# Patient Record
Sex: FEMALE | Race: WHITE | NOT HISPANIC OR LATINO | Employment: FULL TIME | URBAN - METROPOLITAN AREA
[De-identification: names, ages, dates, MRNs, and addresses within clinical notes are randomized per-mention and may not be internally consistent; named-entity substitution may affect disease eponyms.]

---

## 2017-08-01 NOTE — PROGRESS NOTES
SUBJECTIVE:                                                    Lillie Sánchez is a 32 year old female who presents to clinic today for the following health issues:      Tick Bite, Red ring. Right side rib area. X 3 weeks. Redness is going away, but still itches.   No signs of infection. Denies fatigue, fevers, chills, arthralgias, and myalgias.     Hemorrhoids - problems with hemorrhoids for 10 years. States she is not currently in a flare of her symptoms. She would like a referral to colorectal surgery to discuss treatment options. Will try topical steroid treatment in the mean time. She denies any other concerns today.          Problem list and histories reviewed & adjusted, as indicated.  Additional history: as documented    Patient Active Problem List   Diagnosis     Hypothyroidism     CARDIOVASCULAR SCREENING; LDL GOAL LESS THAN 160     Migraine     Right shoulder pain     Tobacco abuse     Menorrhagia     Other specified hypothyroidism     Past Surgical History:   Procedure Laterality Date     NO HISTORY OF SURGERY         Social History   Substance Use Topics     Smoking status: Light Tobacco Smoker     Packs/day: 0.50     Years: 10.00     Types: Cigarettes     Start date: 1/1/2005     Last attempt to quit: 9/4/2015     Smokeless tobacco: Never Used     Alcohol use 0.0 oz/week     Family History   Problem Relation Age of Onset     Breast Cancer Other 50     2 aunts     Depression Sister      Depression Mother      Thyroid Disease Mother      multiple maternal relatives with hypothyroidism     Depression Maternal Grandmother      Thyroid Disease Maternal Grandmother          Current Outpatient Prescriptions   Medication Sig Dispense Refill     Etonogestrel (NEXPLANON SC)        hydrocortisone (ANUSOL-HC) 25 MG Suppository Place 1 suppository (25 mg) rectally 2 times daily (Patient not taking: Reported on 8/10/2017) 28 suppository 1     levothyroxine (SYNTHROID/LEVOTHROID) 100 MCG tablet Take 1 tablet  (100 mcg) by mouth daily 90 tablet 3     clindamycin (CLEOCIN) 300 MG capsule TK ONE C PO QID  0     acetaminophen-codeine (TYLENOL #3) 300-30 MG per tablet TK ONE TO TWO TS PO Q 4 H PRN  0     Allergies   Allergen Reactions     Vicodin [Hydrocodone-Acetaminophen]      nausea     BP Readings from Last 3 Encounters:   08/10/17 119/80   08/04/17 117/72   09/12/16 121/78    Wt Readings from Last 3 Encounters:   08/10/17 147 lb (66.7 kg)   08/04/17 150 lb (68 kg)   09/15/16 146 lb (66.2 kg)                        Reviewed and updated as needed this visit by clinical staff  Tobacco  Allergies  Meds  Med Hx  Surg Hx  Fam Hx  Soc Hx      Reviewed and updated as needed this visit by Provider         ROS:  Constitutional, cardiovascular, pulmonary, gi, integumentary systems are negative, except as otherwise noted.      OBJECTIVE:   /72  Pulse 86  Temp 100.3  F (37.9  C) (Oral)  Wt 150 lb (68 kg)  SpO2 99%  BMI 23.49 kg/m2  Body mass index is 23.49 kg/(m^2).  GENERAL: healthy, alert and no distress  NECK: no adenopathy, no asymmetry, masses, or scars and thyroid normal to palpation  RESP: lungs clear to auscultation - no rales, rhonchi or wheezes  CV: regular rate and rhythm, normal S1 S2, no S3 or S4, no murmur, click or rub, no peripheral edema and peripheral pulses strong  RECTAL (female): normal sphincter tone, no rectal masses and 1 external hemorrhoid - non-thrombosed, no bleeding  MS: no gross musculoskeletal defects noted, no edema  SKIN: no suspicious lesions or rashes and right lateral rib cage - small area of erythema, no swelling, no drainage, no tenderness to palpation, no signs of infection, no bulls-eye rash present    Diagnostic Test Results:  none    ASSESSMENT/PLAN:       ICD-10-CM    1. External hemorrhoids K64.4 COLORECTAL SURGERY REFERRAL     hydrocortisone (ANUSOL-HC) 25 MG Suppository   2. Tick bite, initial encounter W57.XXXA        Patient Instructions   Tick Bite:  Apply over the  counter hydrocortisone cream twice daily. This will help bring down inflammation and itching. Use cool compresses to decrease itching. Return to the clinic if spreading redness, pain, foul drainage, fevers, or any other concerns.   Hemorrhoids    Hemorrhoids are swollen and inflamed veins inside the rectum and near the anus. The rectum is the last several inches of the colon. The anus is the passage between the rectum and the outside of the body.  Causes  The veins can become swollen due to increased pressure in them. This is most often caused by:    Chronic constipation or diarrhea    Straining when having a bowel movement    Sitting too long on the toilet    A low-fiber diet    Pregnancy  Symptoms    Bleeding from the rectum (this may be noticeable after bowel movements)    Lump near the anus    Itching around the anus    Pain around the anus  There are different types of hemorrhoids. Depending on the type you have and the severity, you may be able to treat yourself at home. In some cases, a procedure may be the best treatment option. Your healthcare provider can tell you more about this, if needed.  Home care  General care    To get relief from pain or itching, try:    Topical products. Your healthcare provider may prescribe or recommend creams, ointments, or pads that can be applied to the hemorrhoid. Use these exactly as directed.    Medicines. Your healthcare provider may recommend stool softeners, suppositories, or laxatives to help manage constipation. Use these exactly as directed.    Sitz baths. A sitz bath involves sitting in a few inches of warm bath water. Be careful not to make the water so hot that you burn yourself--test it before sitting in it. Soak for about 10 to 15 minutes a few times a day. This may help relieve pain.  Tips to help prevent hemorrhoids    Eat more fiber. Fiber adds bulk to stool and absorbs water as it moves through your colon. This makes stool softer and easier to  pass.    Increase the fiber in your diet with more fiber-rich foods. These include fresh fruit, vegetables, and whole grains.    Take a fiber supplement or bulking agent, if advised to by your provider. These include products such as psyllium or methylcellulose.    Drink plenty of water, if directed to by your provider. This can help keep stool soft.    Be more active. Frequent exercise aids digestion and helps prevent constipation. It may also help make bowel movements more regular.    Don t strain during bowel movements. This can make hemorrhoids more likely. Also, don t sit on the toilet for long periods of time.  Follow-up care  Follow up with your healthcare provider, or as advised. If a culture or imaging tests were done, you will be notified of the results when they are ready. This may take a few days or longer.  When to seek medical advice  Call your healthcare provider right away if any of these occur:    Increased bleeding from the rectum    Increased pain around the rectum or anus    Weakness or dizziness  Call 911  Call 911 or return to the emergency department right away if any of these occur:    Trouble breathing or swallowing    Fainting or loss of consciousness    Unusually fast heart rate    Vomiting blood    Large amounts of blood in stool  Date Last Reviewed: 6/22/2015 2000-2017 [employer]. [address] [place] [address], [place] [address]. All rights reserved. This information is not intended as a substitute for professional medical care. Always follow your healthcare professional's instructions.            Mehnaz Villarreal PA-C  [facility]

## 2017-08-04 ENCOUNTER — OFFICE VISIT (OUTPATIENT)
Dept: FAMILY MEDICINE | Facility: CLINIC | Age: 32
End: 2017-08-04
Payer: COMMERCIAL

## 2017-08-04 VITALS
HEART RATE: 86 BPM | OXYGEN SATURATION: 99 % | TEMPERATURE: 100.3 F | DIASTOLIC BLOOD PRESSURE: 72 MMHG | SYSTOLIC BLOOD PRESSURE: 117 MMHG | WEIGHT: 150 LBS | BODY MASS INDEX: 23.49 KG/M2
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
## 2017-08-04 DIAGNOSIS — K64.4 EXTERNAL HEMORRHOIDS: Primary | ICD-10-CM

## 2017-08-04 PROCEDURE — 99213 OFFICE O/P EST LOW 20 MIN: CPT | Performed by: PHYSICIAN ASSISTANT

## 2017-08-04 RX ORDER — HYDROCORTISONE ACETATE 25 MG/1
25 SUPPOSITORY RECTAL 2 TIMES DAILY
Qty: 28 SUPPOSITORY | Refills: 1 | Status: SHIPPED | OUTPATIENT
Start: 2017-08-04 | End: 2018-10-18

## 2017-08-04 RX ORDER — CLINDAMYCIN HCL 300 MG
CAPSULE ORAL
Refills: 0 | COMMUNITY
Start: 2017-08-01 | End: 2018-10-18

## 2017-08-04 NOTE — PATIENT INSTRUCTIONS
Tick Bite:  Apply over the counter hydrocortisone cream twice daily. This will help bring down inflammation and itching. Use cool compresses to decrease itching. Return to the clinic if spreading redness, pain, foul drainage, fevers, or any other concerns.   Hemorrhoids    Hemorrhoids are swollen and inflamed veins inside the rectum and near the anus. The rectum is the last several inches of the colon. The anus is the passage between the rectum and the outside of the body.  Causes  The veins can become swollen due to increased pressure in them. This is most often caused by:    Chronic constipation or diarrhea    Straining when having a bowel movement    Sitting too long on the toilet    A low-fiber diet    Pregnancy  Symptoms    Bleeding from the rectum (this may be noticeable after bowel movements)    Lump near the anus    Itching around the anus    Pain around the anus  There are different types of hemorrhoids. Depending on the type you have and the severity, you may be able to treat yourself at home. In some cases, a procedure may be the best treatment option. Your healthcare provider can tell you more about this, if needed.  Home care  General care    To get relief from pain or itching, try:    Topical products. Your healthcare provider may prescribe or recommend creams, ointments, or pads that can be applied to the hemorrhoid. Use these exactly as directed.    Medicines. Your healthcare provider may recommend stool softeners, suppositories, or laxatives to help manage constipation. Use these exactly as directed.    Sitz baths. A sitz bath involves sitting in a few inches of warm bath water. Be careful not to make the water so hot that you burn yourself--test it before sitting in it. Soak for about 10 to 15 minutes a few times a day. This may help relieve pain.  Tips to help prevent hemorrhoids    Eat more fiber. Fiber adds bulk to stool and absorbs water as it moves through your colon. This makes stool softer  and easier to pass.    Increase the fiber in your diet with more fiber-rich foods. These include fresh fruit, vegetables, and whole grains.    Take a fiber supplement or bulking agent, if advised to by your provider. These include products such as psyllium or methylcellulose.    Drink plenty of water, if directed to by your provider. This can help keep stool soft.    Be more active. Frequent exercise aids digestion and helps prevent constipation. It may also help make bowel movements more regular.    Don t strain during bowel movements. This can make hemorrhoids more likely. Also, don t sit on the toilet for long periods of time.  Follow-up care  Follow up with your healthcare provider, or as advised. If a culture or imaging tests were done, you will be notified of the results when they are ready. This may take a few days or longer.  When to seek medical advice  Call your healthcare provider right away if any of these occur:    Increased bleeding from the rectum    Increased pain around the rectum or anus    Weakness or dizziness  Call 911  Call 911 or return to the emergency department right away if any of these occur:    Trouble breathing or swallowing    Fainting or loss of consciousness    Unusually fast heart rate    Vomiting blood    Large amounts of blood in stool  Date Last Reviewed: 6/22/2015 2000-2017 [employer]. [address] [place] [address], [place] [address]. All rights reserved. This information is not intended as a substitute for professional medical care. Always follow your healthcare professional's instructions.

## 2017-08-04 NOTE — MR AVS SNAPSHOT
After Visit Summary   8/4/2017    Lillie Sánchez    MRN: [id number]           Patient Information     Date Of Birth          1985        Visit Information        Provider Department      8/4/2017 4:00 PM Mehnaz Villarreal PA-C [facility]        Today's Diagnoses     External hemorrhoids    -  1      Care Instructions    Tick Bite:  Apply over the counter hydrocortisone cream twice daily. This will help bring down inflammation and itching. Use cool compresses to decrease itching. Return to the clinic if spreading redness, pain, foul drainage, fevers, or any other concerns.   Hemorrhoids    Hemorrhoids are swollen and inflamed veins inside the rectum and near the anus. The rectum is the last several inches of the colon. The anus is the passage between the rectum and the outside of the body.  Causes  The veins can become swollen due to increased pressure in them. This is most often caused by:    Chronic constipation or diarrhea    Straining when having a bowel movement    Sitting too long on the toilet    A low-fiber diet    Pregnancy  Symptoms    Bleeding from the rectum (this may be noticeable after bowel movements)    Lump near the anus    Itching around the anus    Pain around the anus  There are different types of hemorrhoids. Depending on the type you have and the severity, you may be able to treat yourself at home. In some cases, a procedure may be the best treatment option. Your healthcare provider can tell you more about this, if needed.  Home care  General care    To get relief from pain or itching, try:    Topical products. Your healthcare provider may prescribe or recommend creams, ointments, or pads that can be applied to the hemorrhoid. Use these exactly as directed.    Medicines. Your healthcare provider may recommend stool softeners, suppositories, or laxatives to help manage constipation. Use these exactly as directed.    Sitz baths. A sitz bath involves  sitting in a few inches of warm bath water. Be careful not to make the water so hot that you burn yourself--test it before sitting in it. Soak for about 10 to 15 minutes a few times a day. This may help relieve pain.  Tips to help prevent hemorrhoids    Eat more fiber. Fiber adds bulk to stool and absorbs water as it moves through your colon. This makes stool softer and easier to pass.    Increase the fiber in your diet with more fiber-rich foods. These include fresh fruit, vegetables, and whole grains.    Take a fiber supplement or bulking agent, if advised to by your provider. These include products such as psyllium or methylcellulose.    Drink plenty of water, if directed to by your provider. This can help keep stool soft.    Be more active. Frequent exercise aids digestion and helps prevent constipation. It may also help make bowel movements more regular.    Don t strain during bowel movements. This can make hemorrhoids more likely. Also, don t sit on the toilet for long periods of time.  Follow-up care  Follow up with your healthcare provider, or as advised. If a culture or imaging tests were done, you will be notified of the results when they are ready. This may take a few days or longer.  When to seek medical advice  Call your healthcare provider right away if any of these occur:    Increased bleeding from the rectum    Increased pain around the rectum or anus    Weakness or dizziness  Call 911  Call 911 or return to the emergency department right away if any of these occur:    Trouble breathing or swallowing    Fainting or loss of consciousness    Unusually fast heart rate    Vomiting blood    Large amounts of blood in stool  Date Last Reviewed: 6/22/2015 2000-2017 [employer] [address] [place] [address], [place] [address]. All rights reserved. This information is not intended as a substitute for professional medical care. Always follow your healthcare professional's instructions.                 Follow-ups after your visit        Additional Services     COLORECTAL SURGERY REFERRAL       Your provider has referred you to: [facility]: Colon and Rectal Surgery [facility] [phone]   [email or web address]  N: Colon and Rectal Surgery [facility] [phone]   [email or web address]  [place] [phone]   [email or web address]  [facility]: [facility] [place] [phone]   [email or web address]  [place] [phone]   [email or web address]    Referral Reason(s): Hemorrhoids  Special Concerns: None  This referral is: urgent - earliest available appointment  It is OK to leave a message on patient's voicemail.    Please be aware that coverage of these services is subject to the terms and limitations of your health insurance plan.  Call member services at your health plan with any benefit or coverage questions.      Please bring the following with you to your appointment:    (1) Any X-Rays, CTs or MRIs which have been performed.  Contact the facility where they were done to arrange for  prior to your scheduled appointment.    (2) List of current medications  (3) This referral request   (4) Any documents/labs given to you for this referral                  Who to contact     If you have questions or need follow up information about today's clinic visit or your schedule please contact [facility] directly at [phone].  Normal or non-critical lab and imaging results will be communicated to you by MyChart, letter or phone within 4 business days after the clinic has received the results. If you do not hear from us within 7 days, please contact the clinic through MyChart or phone. If you have a critical or abnormal lab result, we will notify you by phone as soon as possible.  Submit refill requests through [employer] or call your pharmacy and they will forward the refill request to  us. Please allow 3 business days for your refill to be completed.          Additional Information About Your Visit        [facility] Information     [email or web address] gives you secure access to your electronic health record. If you see a primary care provider, you can also send messages to your care team and make appointments. If you have questions, please call your primary care clinic.  If you do not have a primary care provider, please call [phone] and they will assist you.        Care EveryWhere ID     This is your Care EveryWhere ID. This could be used by other organizations to access your [facility] medical records  [id number]        Your Vitals Were     Pulse Temperature Pulse Oximetry BMI (Body Mass Index)          86 100.3  F (37.9  C) (Oral) 99% 23.49 kg/m2         Blood Pressure from Last 3 Encounters:   08/04/17 117/72   09/12/16 121/78   06/28/16 108/71    Weight from Last 3 Encounters:   08/04/17 150 lb (68 kg)   09/15/16 146 lb (66.2 kg)   09/12/16 141 lb (64 kg)              We Performed the Following     COLORECTAL SURGERY REFERRAL          Today's Medication Changes          These changes are accurate as of: 8/4/17  4:27 PM.  If you have any questions, ask your nurse or doctor.               Start taking these medicines.        Dose/Directions    hydrocortisone 25 MG Suppository   Commonly known as:  ANUSOL-HC   Used for:  External hemorrhoids   Started by:  Mehnaz Villarreal PA-C        Dose:  25 mg   Place 1 suppository (25 mg) rectally 2 times daily   Quantity:  28 suppository   Refills:  1         Stop taking these medicines if you haven't already. Please contact your care team if you have questions.     CHELLE-BE 0.35 MG per tablet   Generic drug:  norethindrone   Stopped by:  Mehnaz Villarreal PA-C           order for DME   Stopped by:  Mehnaz Villarreal PA-C                Where to get your medicines      [facility] [address] [place] [address]  [facility] [address] AT [facility] [address]  [address] [facility] [address]     Phone:  [phone]     hydrocortisone 25 MG Suppository                Primary Care Provider Office Phone # Fax #    [facility] [phone] [address] [facility] [address]        Equal Access to Services     JOSÉ MIGUEL CHONG : Hadii aad ku hadasho Soomaali, waaxda luqadaha, qaybta kaalmada adeegyada, waxay idiin hayaan adeeg kharash la'dougn ah. So [facility] [phone].    ATENCIÓN: Si habla español, tiene a larson disposición servicios gratuitos de asistencia lingüística. [facility] [phone].    We comply with applicable federal civil rights laws and [place] laws. We do not discriminate on the basis of race, color, national origin, age, disability sex, sexual orientation or gender identity.            Thank you!     Thank you for choosing [facility]  for your care. Our goal is always to provide you with excellent care. Hearing back from our patients is one way we can continue to improve our services. Please take a few minutes to complete the written survey that you may receive in the mail after your visit with us. Thank you!             Your Updated Medication List - Protect others around you: Learn how to safely use, store and throw away your medicines at www.disposemymeds.org.          This list is accurate as of: 8/4/17  4:27 PM.  Always use your most recent med list.                   Brand Name Dispense Instructions for use Diagnosis    acetaminophen-codeine 300-30 MG per tablet    TYLENOL #3     TK ONE TO TWO TS PO Q 4 H PRN        clindamycin 300 MG capsule    CLEOCIN     TK ONE C PO QID        hydrocortisone 25 MG Suppository    ANUSOL-HC    28 suppository    Place 1 suppository (25 mg) rectally 2 times daily    External hemorrhoids       levothyroxine 100 MCG tablet    SYNTHROID/LEVOTHROID    90 tablet    Take 1 tablet (100 mcg) by mouth daily    Other specified  hypothyroidism       NEXPLANON SC

## 2017-08-04 NOTE — NURSING NOTE
"Chief Complaint   Patient presents with     Insect Bites     tick bite       Initial /72  Pulse 86  Temp 100.3  F (37.9  C) (Oral)  Wt 150 lb (68 kg)  SpO2 99%  BMI 23.49 kg/m2 Estimated body mass index is 23.49 kg/(m^2) as calculated from the following:    Height as of 9/15/16: 5' 7\" (1.702 m).    Weight as of this encounter: 150 lb (68 kg).  Medication Reconciliation: complete     Rosalia Davis cma        "

## 2017-08-04 NOTE — Clinical Note
Please abstract the following data from this visit with this patient into the appropriate field in Epic:  Pap smear done on this date: 10/15 (approximately), by this group: [facility], results were Normal. Rosalia Davis, cma

## 2017-08-10 ENCOUNTER — OFFICE VISIT (OUTPATIENT)
Dept: FAMILY MEDICINE | Facility: CLINIC | Age: 32
End: 2017-08-10
Payer: COMMERCIAL

## 2017-08-10 VITALS
OXYGEN SATURATION: 100 % | BODY MASS INDEX: 23.07 KG/M2 | HEART RATE: 79 BPM | TEMPERATURE: 98.7 F | WEIGHT: 147 LBS | SYSTOLIC BLOOD PRESSURE: 119 MMHG | DIASTOLIC BLOOD PRESSURE: 80 MMHG | HEIGHT: 67 IN

## 2017-08-10 DIAGNOSIS — E03.8 OTHER SPECIFIED HYPOTHYROIDISM: Primary | ICD-10-CM

## 2017-08-10 LAB — TSH SERPL DL<=0.005 MIU/L-ACNC: 3.19 MU/L (ref 0.4–4)

## 2017-08-10 PROCEDURE — 36415 COLL VENOUS BLD VENIPUNCTURE: CPT | Performed by: PHYSICIAN ASSISTANT

## 2017-08-10 PROCEDURE — 84443 ASSAY THYROID STIM HORMONE: CPT | Performed by: PHYSICIAN ASSISTANT

## 2017-08-10 PROCEDURE — 99213 OFFICE O/P EST LOW 20 MIN: CPT | Performed by: PHYSICIAN ASSISTANT

## 2017-08-10 NOTE — LETTER
[facility]  [id number] [place] [address] [id number] [phone]        August 10, 2017        To Whom It May Concern:      Lillie Sánchez was seen in the clinic today. Please allow her to have a sit / stand desk at her work station.       If you have any questions or concerns, please contact the clinic at [phone].    Thank you,        Kristen Kehr PA-C

## 2017-08-10 NOTE — NURSING NOTE
"Chief Complaint   Patient presents with     Thyroid Problem     medication check per pt and refill       Initial /80  Pulse 79  Temp 98.7  F (37.1  C) (Oral)  Ht 5' 7\" (1.702 m)  Wt 147 lb (66.7 kg)  SpO2 100%  Breastfeeding? No  BMI 23.02 kg/m2 Estimated body mass index is 23.02 kg/(m^2) as calculated from the following:    Height as of this encounter: 5' 7\" (1.702 m).    Weight as of this encounter: 147 lb (66.7 kg).  Medication Reconciliation: complete      Viral Stratton MA    "

## 2017-08-10 NOTE — MR AVS SNAPSHOT
"              After Visit Summary   8/10/2017    Lillie Sánchez    MRN: [id number]           Patient Information     Date Of Birth          1985        Visit Information        Provider Department      8/10/2017 12:50 PM Kehr, Kristen M, PA-C [facility]        Today's Diagnoses     Other specified hypothyroidism    -  1       Follow-ups after your visit        Who to contact     If you have questions or need follow up information about today's clinic visit or your schedule please contact [facility] directly at [phone].  Normal or non-critical lab and imaging results will be communicated to you by SunnyBumphart, letter or phone within 4 business days after the clinic has received the results. If you do not hear from us within 7 days, please contact the clinic through Betaspringt or phone. If you have a critical or abnormal lab result, we will notify you by phone as soon as possible.  Submit refill requests through 4Blox or call your pharmacy and they will forward the refill request to us. Please allow 3 business days for your refill to be completed.          Additional Information About Your Visit        MyChart Information     4Blox gives you secure access to your electronic health record. If you see a primary care provider, you can also send messages to your care team and make appointments. If you have questions, please call your primary care clinic.  If you do not have a primary care provider, please call [phone] and they will assist you.        Care EveryWhere ID     This is your Care EveryWhere ID. This could be used by other organizations to access your [place] medical records  [id number]        Your Vitals Were     Pulse Temperature Height Pulse Oximetry Breastfeeding? BMI (Body Mass Index)    79 98.7  F (37.1  C) (Oral) 5' 7\" (1.702 m) 100% No 23.02 kg/m2       Blood Pressure from Last 3 Encounters:   08/10/17 119/80   08/04/17 117/72   09/12/16 121/78    Weight " from Last 3 Encounters:   08/10/17 147 lb (66.7 kg)   08/04/17 150 lb (68 kg)   09/15/16 146 lb (66.2 kg)              We Performed the Following     TSH with free T4 reflex        Primary Care Provider Office Phone # Fax #    [facility] [phone] [address] [place] [address]        Equal Access to Services     JOSÉ MIGUEL CHONG : Hadii aad ku hadasho Soomaali, waaxda luqadaha, qaybta kaalmada adeegyada, waxay idiin haydougn adetonya cabralesdaneglorobby ferro. So [facility] [phone].    ATENCIÓN: Si carrillo santos, tiene a larson disposición servicios gratuitos de asistencia lingüística. Llame al [phone].    We comply with applicable federal civil rights laws and [place] laws. We do not discriminate on the basis of race, color, national origin, age, disability sex, sexual orientation or gender identity.            Thank you!     Thank you for choosing [facility]  for your care. Our goal is always to provide you with excellent care. Hearing back from our patients is one way we can continue to improve our services. Please take a few minutes to complete the written survey that you may receive in the mail after your visit with us. Thank you!             Your Updated Medication List - Protect others around you: Learn how to safely use, store and throw away your medicines at www.disposemymeds.org.          This list is accurate as of: 8/10/17  1:07 PM.  Always use your most recent med list.                   Brand Name Dispense Instructions for use Diagnosis    acetaminophen-codeine 300-30 MG per tablet    TYLENOL #3     TK ONE TO TWO TS PO Q 4 H PRN        clindamycin 300 MG capsule    CLEOCIN     TK ONE C PO QID        hydrocortisone 25 MG Suppository    ANUSOL-HC    28 suppository    Place 1 suppository (25 mg) rectally 2 times daily    External hemorrhoids       levothyroxine 100 MCG tablet    SYNTHROID/LEVOTHROID    90 tablet    Take 1 tablet (100 mcg) by mouth daily    Other  specified hypothyroidism       NEXPLANON SC

## 2017-08-10 NOTE — PROGRESS NOTES
SUBJECTIVE:                                                    Lillie Sánchez is a 32 year old female who presents to clinic today for the following health issues:    Hypothyroidism Follow-up      Since last visit, patient describes the following symptoms: weight issue per pt and constipation      Amount of exercise or physical activity: 4-5 days/week for an average of 30-45 minutes    Problems taking medications regularly: No    Medication side effects: none    Diet: regular (no restrictions)          Problem list and histories reviewed & adjusted, as indicated.  Additional history: as documented    Patient Active Problem List   Diagnosis     Hypothyroidism     CARDIOVASCULAR SCREENING; LDL GOAL LESS THAN 160     Migraine     Right shoulder pain     Tobacco abuse     Menorrhagia     Other specified hypothyroidism     Past Surgical History:   Procedure Laterality Date     NO HISTORY OF SURGERY         Social History   Substance Use Topics     Smoking status: Light Tobacco Smoker     Packs/day: 0.50     Years: 10.00     Types: Cigarettes     Start date: 1/1/2005     Last attempt to quit: 9/4/2015     Smokeless tobacco: Never Used     Alcohol use 0.0 oz/week     Family History   Problem Relation Age of Onset     Breast Cancer Other 50     2 aunts     Depression Sister      Depression Mother      Thyroid Disease Mother      multiple maternal relatives with hypothyroidism     Depression Maternal Grandmother      Thyroid Disease Maternal Grandmother          Current Outpatient Prescriptions   Medication Sig Dispense Refill     Etonogestrel (NEXPLANON SC)        levothyroxine (SYNTHROID,LEVOTHROID) 100 MCG tablet Take 1 tablet (100 mcg) by mouth daily 90 tablet 3     clindamycin (CLEOCIN) 300 MG capsule TK ONE C PO QID  0     acetaminophen-codeine (TYLENOL #3) 300-30 MG per tablet TK ONE TO TWO TS PO Q 4 H PRN  0     hydrocortisone (ANUSOL-HC) 25 MG Suppository Place 1 suppository (25 mg) rectally 2 times daily  "(Patient not taking: Reported on 8/10/2017) 28 suppository 1     Allergies   Allergen Reactions     Vicodin [Hydrocodone-Acetaminophen]      nausea         ROS:  Constitutional, HEENT, cardiovascular, pulmonary, gi and gu systems are negative, except as otherwise noted.      OBJECTIVE:   /80  Pulse 79  Temp 98.7  F (37.1  C) (Oral)  Ht 5' 7\" (1.702 m)  Wt 147 lb (66.7 kg)  SpO2 100%  Breastfeeding? No  BMI 23.02 kg/m2  Body mass index is 23.02 kg/(m^2).  GENERAL: healthy, alert and no distress  NECK: no adenopathy, no asymmetry, masses, or scars and thyroid normal to palpation  PSYCH: mentation appears normal, affect normal/bright    Diagnostic Test Results:  pending    ASSESSMENT/PLAN:     1. Other specified hypothyroidism  Update TSH level and send refills after results are back.   - TSH with free T4 reflex      Kristen M. Kehr, PA-C  [facility]    "

## 2017-08-13 RX ORDER — LEVOTHYROXINE SODIUM 100 UG/1
100 TABLET ORAL DAILY
Qty: 90 TABLET | Refills: 3 | Status: SHIPPED | OUTPATIENT
Start: 2017-08-13 | End: 2018-10-22

## 2017-09-21 ENCOUNTER — TRANSFERRED RECORDS (OUTPATIENT)
Dept: HEALTH INFORMATION MANAGEMENT | Facility: CLINIC | Age: 32
End: 2017-09-21

## 2017-09-27 ENCOUNTER — TRANSFERRED RECORDS (OUTPATIENT)
Dept: HEALTH INFORMATION MANAGEMENT | Facility: CLINIC | Age: 32
End: 2017-09-27

## 2017-10-09 ENCOUNTER — TRANSFERRED RECORDS (OUTPATIENT)
Dept: HEALTH INFORMATION MANAGEMENT | Facility: CLINIC | Age: 32
End: 2017-10-09

## 2017-12-06 ENCOUNTER — TRANSFERRED RECORDS (OUTPATIENT)
Dept: HEALTH INFORMATION MANAGEMENT | Facility: CLINIC | Age: 32
End: 2017-12-06

## 2018-01-05 ENCOUNTER — TRANSFERRED RECORDS (OUTPATIENT)
Dept: HEALTH INFORMATION MANAGEMENT | Facility: CLINIC | Age: 33
End: 2018-01-05

## 2018-10-18 ENCOUNTER — OFFICE VISIT (OUTPATIENT)
Dept: FAMILY MEDICINE | Facility: CLINIC | Age: 33
End: 2018-10-18
Payer: COMMERCIAL

## 2018-10-18 VITALS
DIASTOLIC BLOOD PRESSURE: 74 MMHG | HEART RATE: 95 BPM | WEIGHT: 153 LBS | OXYGEN SATURATION: 100 % | RESPIRATION RATE: 16 BRPM | SYSTOLIC BLOOD PRESSURE: 121 MMHG | TEMPERATURE: 99.5 F | BODY MASS INDEX: 24.01 KG/M2 | HEIGHT: 67 IN

## 2018-10-18 DIAGNOSIS — E03.8 OTHER SPECIFIED HYPOTHYROIDISM: Primary | ICD-10-CM

## 2018-10-18 DIAGNOSIS — M54.50 ACUTE BILATERAL LOW BACK PAIN WITHOUT SCIATICA: ICD-10-CM

## 2018-10-18 LAB
T4 FREE SERPL-MCNC: 1 NG/DL (ref 0.76–1.46)
TSH SERPL DL<=0.005 MIU/L-ACNC: 5.32 MU/L (ref 0.4–4)

## 2018-10-18 PROCEDURE — 84443 ASSAY THYROID STIM HORMONE: CPT | Performed by: PHYSICIAN ASSISTANT

## 2018-10-18 PROCEDURE — 36415 COLL VENOUS BLD VENIPUNCTURE: CPT | Performed by: PHYSICIAN ASSISTANT

## 2018-10-18 PROCEDURE — 84439 ASSAY OF FREE THYROXINE: CPT | Performed by: PHYSICIAN ASSISTANT

## 2018-10-18 PROCEDURE — 99213 OFFICE O/P EST LOW 20 MIN: CPT | Performed by: PHYSICIAN ASSISTANT

## 2018-10-18 RX ORDER — NORGESTREL AND ETHINYL ESTRADIOL 0.3-0.03MG
KIT ORAL
COMMUNITY
Start: 2018-09-14 | End: 2022-06-29

## 2018-10-18 ASSESSMENT — PAIN SCALES - GENERAL: PAINLEVEL: MILD PAIN (3)

## 2018-10-18 NOTE — MR AVS SNAPSHOT
After Visit Summary   10/18/2018    Lillie Sánchez    MRN: [id number]           Patient Information     Date Of Birth          1985        Visit Information        Provider Department      10/18/2018 12:40 PM Kehr, Kristen M, PA-C [facility]        Today's Diagnoses     Other specified hypothyroidism    -  1    Acute bilateral low back pain without sciatica           Follow-ups after your visit        Additional Services     [facility] PT, HAND, AND CHIROPRACTIC REFERRAL       Physical Therapy, Hand Therapy and Chiropractic Care are available through:  *[facility]  *Hand Therapy (Occupational Therapy or Physical Therapy)  *[facility]    Call one number to schedule at any of the above locations: [phone].    Physical therapy, Hand therapy and/or Chiropractic care has been recommended by your physician as an excellent treatment option to reduce pain and help people return to normal activities, including sports.  Therapy and/or chiropractic care services are a great complement or alternative to expensive and invasive surgery, injections, or long-term use of prescription medications. The primary goal is to identify the underlying problem and provide you the tools to manage your condition on your own.     Please be aware that coverage of these services is subject to the terms and limitations of your health insurance plan.  Call member services at your health plan with any benefit or coverage questions.      Please bring the following to your appointment:  *Your personal calendar for scheduling future appointments  *Comfortable clothing                  Follow-up notes from your care team     Return in about 1 year (around 10/18/2019) for medication check.      Future tests that were ordered for you today     Open Future Orders        Priority Expected Expires Ordered    [facility] PT, HAND, AND CHIROPRACTIC REFERRAL Routine  10/18/2019  "10/18/2018            Who to contact     If you have questions or need follow up information about today's clinic visit or your schedule please contact [facility] directly at [phone].  Normal or non-critical lab and imaging results will be communicated to you by MyChart, letter or phone within 4 business days after the clinic has received the results. If you do not hear from us within 7 days, please contact the clinic through MyChart or phone. If you have a critical or abnormal lab result, we will notify you by phone as soon as possible.  Submit refill requests through Sprig or call your pharmacy and they will forward the refill request to us. Please allow 3 business days for your refill to be completed.          Additional Information About Your Visit        Cortiliahart Information     Sprig gives you secure access to your electronic health record. If you see a primary care provider, you can also send messages to your care team and make appointments. If you have questions, please call your primary care clinic.  If you do not have a primary care provider, please call [phone] and they will assist you.        Care EveryWhere ID     This is your Care EveryWhere ID. This could be used by other organizations to access your [place] medical records  [id number]        Your Vitals Were     Pulse Temperature Respirations Height Pulse Oximetry BMI (Body Mass Index)    95 99.5  F (37.5  C) (Oral) 16 5' 7\" (1.702 m) 100% 23.96 kg/m2       Blood Pressure from Last 3 Encounters:   10/18/18 121/74   08/10/17 119/80   08/04/17 117/72    Weight from Last 3 Encounters:   10/18/18 153 lb (69.4 kg)   08/10/17 147 lb (66.7 kg)   08/04/17 150 lb (68 kg)              We Performed the Following     TSH with free T4 reflex          Today's Medication Changes          These changes are accurate as of 10/18/18  1:00 PM.  If you have any questions, ask your nurse or doctor.               Stop taking these medicines if " you haven't already. Please contact your care team if you have questions.     acetaminophen-codeine 300-30 MG per tablet   Commonly known as:  TYLENOL #3   Stopped by:  Kehr, Kristen M, PA-C           clindamycin 300 MG capsule   Commonly known as:  CLEOCIN   Stopped by:  Kehr, Kristen M, PA-C           hydrocortisone 25 MG Suppository   Commonly known as:  ANUSOL-HC   Stopped by:  Kehr, Kristen M, PA-C           NEXPLANON SC   Stopped by:  Kehr, Kristen M, PA-C                    Primary Care Provider Office Phone # Fax #    Kristen M Kehr, PA-C [phone] [address] [facility] [address]        Equal Access to Services     JODY CHONG : Hadii gómez ku hadasho Soomaali, waaxda luqadaha, qaybta kaalmada adeegyada, waxay idiin haykat strickland . So [facility] [phone].    ATENCIÓN: Si habla español, tiene a larson disposición servicios gratuitos de asistencia lingüística. [facility] [phone].    We comply with applicable federal civil rights laws and [place] laws. We do not discriminate on the basis of race, color, national origin, age, disability, sex, sexual orientation, or gender identity.            Thank you!     Thank you for choosing [facility]  for your care. Our goal is always to provide you with excellent care. Hearing back from our patients is one way we can continue to improve our services. Please take a few minutes to complete the written survey that you may receive in the mail after your visit with us. Thank you!             Your Updated Medication List - Protect others around you: Learn how to safely use, store and throw away your medicines at www.disposemymeds.org.          This list is accurate as of 10/18/18  1:00 PM.  Always use your most recent med list.                   Brand Name Dispense Instructions for use Diagnosis    levothyroxine 100 MCG tablet    SYNTHROID/LEVOTHROID    90 tablet    Take 1 tablet (100 mcg) by mouth daily    Other  specified hypothyroidism       LOW-OGESTREL 0.3-30 MG-MCG per tablet   Generic drug:  norgestrel-ethinyl estradiol

## 2018-10-18 NOTE — PROGRESS NOTES
"  SUBJECTIVE:   Lillie Sánchez is a 33 year old female who presents to clinic today for the following health issues:      Hypothyroidism Follow-up      Since last visit, patient describes the following symptoms: Weight stable, no hair loss, no skin changes, no constipation, no loose stools      Amount of exercise or physical activity: None    Problems taking medications regularly: No    Medication side effects: none    Diet: regular (no restrictions)        PROBLEMS TO ADD ON...  Low back pain: \"locked\" up on her last week. She reports pain across the low back. No radiation. No weakness or numbness and no loss of bowel or bladder function. She has been stretching at home with some relief.     Problem list and histories reviewed & adjusted, as indicated.  Additional history: as documented    Patient Active Problem List   Diagnosis     Hypothyroidism     CARDIOVASCULAR SCREENING; LDL GOAL LESS THAN 160     Migraine     Right shoulder pain     Tobacco abuse     Menorrhagia     Other specified hypothyroidism     Past Surgical History:   Procedure Laterality Date     NO HISTORY OF SURGERY         Social History   Substance Use Topics     Smoking status: Light Tobacco Smoker     Packs/day: 0.50     Years: 10.00     Types: Cigarettes     Start date: 1/1/2005     Last attempt to quit: 9/4/2015     Smokeless tobacco: Never Used     Alcohol use 0.0 oz/week     Family History   Problem Relation Age of Onset     Breast Cancer Other 50     2 aunts     Depression Sister      Depression Mother      Thyroid Disease Mother      multiple maternal relatives with hypothyroidism     Depression Maternal Grandmother      Thyroid Disease Maternal Grandmother          Current Outpatient Prescriptions   Medication Sig Dispense Refill     levothyroxine (SYNTHROID/LEVOTHROID) 100 MCG tablet Take 1 tablet (100 mcg) by mouth daily 90 tablet 3     LOW-OGESTREL 0.3-30 MG-MCG per tablet        Allergies   Allergen Reactions     Vicodin " "[Hydrocodone-Acetaminophen]      nausea       Reviewed and updated as needed this visit by clinical staff       Reviewed and updated as needed this visit by Provider         ROS:  Constitutional, HEENT, cardiovascular, pulmonary, GI, , musculoskeletal, neuro, skin, endocrine and psych systems are negative, except as otherwise noted.    OBJECTIVE:     /74  Pulse 95  Temp 99.5  F (37.5  C) (Oral)  Resp 16  Ht 5' 7\" (1.702 m)  Wt 153 lb (69.4 kg)  SpO2 100%  BMI 23.96 kg/m2  Body mass index is 23.96 kg/(m^2).  GENERAL: healthy, alert and no distress  NECK: no adenopathy, no asymmetry, masses, or scars and thyroid normal to palpation  MS: no gross musculoskeletal defects noted, no edema  SKIN: no suspicious lesions or rashes  NEURO: Normal strength and tone, mentation intact and speech normal  PSYCH: mentation appears normal, affect normal/bright    Diagnostic Test Results:  none     ASSESSMENT/PLAN:         1. Other specified hypothyroidism  Check TSH and I will send a refill of her medication as soon as results are available.   - TSH with free T4 reflex    2. Acute bilateral low back pain without sciatica  Continue with the heat / ice / NSAIDS and stretching.    if she is on continuing to have improvement, then she will start therapy.   - [facility], HAND, AND CHIROPRACTIC REFERRAL; Future        Kristen M. Kehr, PA-C  [facility]  "

## 2018-10-18 NOTE — NURSING NOTE
"Chief Complaint   Patient presents with     Thyroid Problem     Health Maintenance       Initial /74  Pulse 95  Temp 99.5  F (37.5  C) (Oral)  Resp 16  Ht 5' 7\" (1.702 m)  Wt 153 lb (69.4 kg)  SpO2 100%  BMI 23.96 kg/m2 Estimated body mass index is 23.96 kg/(m^2) as calculated from the following:    Height as of this encounter: 5' 7\" (1.702 m).    Weight as of this encounter: 153 lb (69.4 kg).  Medication Reconciliation: complete  Rosalia Davis CMA  "

## 2018-10-22 DIAGNOSIS — E03.8 OTHER SPECIFIED HYPOTHYROIDISM: ICD-10-CM

## 2018-10-22 RX ORDER — LEVOTHYROXINE SODIUM 100 UG/1
100 TABLET ORAL DAILY
Qty: 90 TABLET | Refills: 3 | Status: SHIPPED | OUTPATIENT
Start: 2018-10-22 | End: 2019-10-04

## 2018-11-05 ENCOUNTER — MYC MEDICAL ADVICE (OUTPATIENT)
Dept: FAMILY MEDICINE | Facility: CLINIC | Age: 33
End: 2018-11-05

## 2018-11-05 DIAGNOSIS — E03.8 OTHER SPECIFIED HYPOTHYROIDISM: Primary | ICD-10-CM

## 2018-11-05 NOTE — TELEPHONE ENCOUNTER
PHQ-9 SCORE 11/5/2018   Total Score MyChart 8 (Mild depression)   Total Score 8     MU-7 SCORE 11/5/2018   Total Score 7 (mild anxiety)   Total Score 7       Stefanie Reid RN

## 2018-11-05 NOTE — TELEPHONE ENCOUNTER
Per protocol, will route encounter to be cosigned by provider for Verbal Orders.  Stefanie Reid RN

## 2018-11-07 DIAGNOSIS — E03.8 OTHER SPECIFIED HYPOTHYROIDISM: ICD-10-CM

## 2018-11-07 LAB — TSH SERPL DL<=0.005 MIU/L-ACNC: 3.61 MU/L (ref 0.4–4)

## 2018-11-07 PROCEDURE — 36415 COLL VENOUS BLD VENIPUNCTURE: CPT | Performed by: PHYSICIAN ASSISTANT

## 2018-11-07 PROCEDURE — 84443 ASSAY THYROID STIM HORMONE: CPT | Performed by: PHYSICIAN ASSISTANT

## 2018-12-11 ENCOUNTER — OFFICE VISIT (OUTPATIENT)
Dept: FAMILY MEDICINE | Facility: CLINIC | Age: 33
End: 2018-12-11
Payer: COMMERCIAL

## 2018-12-11 VITALS
DIASTOLIC BLOOD PRESSURE: 85 MMHG | SYSTOLIC BLOOD PRESSURE: 124 MMHG | BODY MASS INDEX: 23.81 KG/M2 | TEMPERATURE: 99 F | WEIGHT: 152 LBS | OXYGEN SATURATION: 99 % | HEART RATE: 89 BPM

## 2018-12-11 DIAGNOSIS — E03.8 OTHER SPECIFIED HYPOTHYROIDISM: ICD-10-CM

## 2018-12-11 DIAGNOSIS — F32.0 MILD MAJOR DEPRESSION (H): Primary | ICD-10-CM

## 2018-12-11 PROCEDURE — 99213 OFFICE O/P EST LOW 20 MIN: CPT | Performed by: PHYSICIAN ASSISTANT

## 2018-12-11 RX ORDER — CITALOPRAM HYDROBROMIDE 20 MG/1
TABLET ORAL
Qty: 30 TABLET | Refills: 1 | Status: SHIPPED | OUTPATIENT
Start: 2018-12-11 | End: 2019-01-23

## 2018-12-11 ASSESSMENT — PAIN SCALES - GENERAL: PAINLEVEL: NO PAIN (0)

## 2018-12-11 NOTE — NURSING NOTE
"Chief Complaint   Patient presents with     Depression       Initial /85   Pulse 89   Temp 99  F (37.2  C) (Oral)   Wt 68.9 kg (152 lb)   SpO2 99%   BMI 23.81 kg/m   Estimated body mass index is 23.81 kg/m  as calculated from the following:    Height as of 10/18/18: 1.702 m (5' 7\").    Weight as of this encounter: 68.9 kg (152 lb).  Medication Reconciliation: complete    SELENE Martines MA    "

## 2018-12-11 NOTE — PROGRESS NOTES
SUBJECTIVE:   Lillie Sánchez is a 33 year old female who presents to clinic today for the following health issues:    Lillie is here today to discuss anxiety and depression. She has had symptoms for months. Her thyroid testing was recently done and normal. She is on levothyroxine 100 mcg daily. She has noticed the following changes in her moods.     Abnormal Mood Symptoms  Onset: 10/2018    Description:   Depression: YES  Anxiety: YES    Accompanying Signs & Symptoms:  Still participating in activities that you used to enjoy: no  Fatigue: YES  Irritability: YES  Difficulty concentrating: YES  Changes in appetite: YES  Problems with sleep: YES- sleeping too much  Heart racing/beating fast : no  Thoughts of hurting yourself or others: none    History:   Recent stress: YES  Prior depression hospitalization: None  Family history of depression: YES  Family history of anxiety: YES    Precipitating factors:   Alcohol/drug use: no    Alleviating factors:      Therapies Tried and outcome: None      Problem list and histories reviewed & adjusted, as indicated.  Additional history: as documented    Patient Active Problem List   Diagnosis     Hypothyroidism     CARDIOVASCULAR SCREENING; LDL GOAL LESS THAN 160     Migraine     Right shoulder pain     Tobacco abuse     Menorrhagia     Other specified hypothyroidism     Past Surgical History:   Procedure Laterality Date     NO HISTORY OF SURGERY         Social History     Tobacco Use     Smoking status: Light Tobacco Smoker     Packs/day: 0.50     Years: 10.00     Pack years: 5.00     Types: Cigarettes     Start date: 1/1/2005     Last attempt to quit: 9/4/2015     Years since quitting: 3.2     Smokeless tobacco: Never Used   Substance Use Topics     Alcohol use: Yes     Alcohol/week: 0.0 oz     Family History   Problem Relation Age of Onset     Breast Cancer Other 50        2 aunts     Mental Illness Other      Depression Sister      Depression Mother      Thyroid  Disease Mother         multiple maternal relatives with hypothyroidism     Depression Maternal Grandmother      Thyroid Disease Maternal Grandmother          Current Outpatient Medications   Medication Sig Dispense Refill     citalopram (CELEXA) 20 MG tablet Take 1/2 tablet (10 mg) for 1 week, then increase to 1 tablet orally daily 30 tablet 1     levothyroxine (SYNTHROID/LEVOTHROID) 100 MCG tablet Take 1 tablet (100 mcg) by mouth daily 90 tablet 3     LOW-OGESTREL 0.3-30 MG-MCG per tablet        Allergies   Allergen Reactions     Vicodin [Hydrocodone-Acetaminophen]      nausea       Reviewed and updated as needed this visit by clinical staff       Reviewed and updated as needed this visit by Provider         ROS:  Constitutional, HEENT, cardiovascular, pulmonary, GI, , musculoskeletal, neuro, skin, endocrine and psych systems are negative, except as otherwise noted.    OBJECTIVE:     /85   Pulse 89   Temp 99  F (37.2  C) (Oral)   Wt 68.9 kg (152 lb)   SpO2 99%   BMI 23.81 kg/m    Body mass index is 23.81 kg/m .  GENERAL: healthy, alert and no distress  PSYCH: mentation appears normal, affect flat / tearful, judgement and insight intact and appearance well groomed    Diagnostic Test Results:  none     ASSESSMENT/PLAN:       1. Mild major depression (H)  Discussed treatment options.   Counseling encouraged, she declines at this time  Start citalopram. Side effects and how to take the medication discussed.  She will follow up in 1-2 months. Okay to send a [place] message or be seen if concerns in the meantime.   - citalopram (CELEXA) 20 MG tablet; Take 1/2 tablet (10 mg) for 1 week, then increase to 1 tablet orally daily  Dispense: 30 tablet; Refill: 1    20 minutes spent with Lillie today. Over 50% of time taken for discussion of above, counseling and coordination of care.       Kristen M. Kehr, PA-C  [facility]

## 2018-12-12 ASSESSMENT — ANXIETY QUESTIONNAIRES
5. BEING SO RESTLESS THAT IT IS HARD TO SIT STILL: NOT AT ALL
IF YOU CHECKED OFF ANY PROBLEMS ON THIS QUESTIONNAIRE, HOW DIFFICULT HAVE THESE PROBLEMS MADE IT FOR YOU TO DO YOUR WORK, TAKE CARE OF THINGS AT HOME, OR GET ALONG WITH OTHER PEOPLE: VERY DIFFICULT
3. WORRYING TOO MUCH ABOUT DIFFERENT THINGS: MORE THAN HALF THE DAYS
7. FEELING AFRAID AS IF SOMETHING AWFUL MIGHT HAPPEN: MORE THAN HALF THE DAYS
1. FEELING NERVOUS, ANXIOUS, OR ON EDGE: SEVERAL DAYS
6. BECOMING EASILY ANNOYED OR IRRITABLE: NEARLY EVERY DAY
2. NOT BEING ABLE TO STOP OR CONTROL WORRYING: MORE THAN HALF THE DAYS
GAD7 TOTAL SCORE: 10

## 2018-12-12 ASSESSMENT — PATIENT HEALTH QUESTIONNAIRE - PHQ9
5. POOR APPETITE OR OVEREATING: NOT AT ALL
SUM OF ALL RESPONSES TO PHQ QUESTIONS 1-9: 21

## 2018-12-13 ASSESSMENT — ANXIETY QUESTIONNAIRES: GAD7 TOTAL SCORE: 10

## 2019-01-16 ENCOUNTER — TELEPHONE (OUTPATIENT)
Dept: FAMILY MEDICINE | Facility: CLINIC | Age: 34
End: 2019-01-16

## 2019-01-23 ENCOUNTER — OFFICE VISIT (OUTPATIENT)
Dept: FAMILY MEDICINE | Facility: CLINIC | Age: 34
End: 2019-01-23
Payer: COMMERCIAL

## 2019-01-23 VITALS
WEIGHT: 153 LBS | HEART RATE: 79 BPM | BODY MASS INDEX: 23.96 KG/M2 | SYSTOLIC BLOOD PRESSURE: 109 MMHG | DIASTOLIC BLOOD PRESSURE: 73 MMHG | OXYGEN SATURATION: 99 % | TEMPERATURE: 98.1 F

## 2019-01-23 DIAGNOSIS — F32.0 MILD MAJOR DEPRESSION (H): ICD-10-CM

## 2019-01-23 PROCEDURE — 99213 OFFICE O/P EST LOW 20 MIN: CPT | Performed by: PHYSICIAN ASSISTANT

## 2019-01-23 RX ORDER — CITALOPRAM HYDROBROMIDE 20 MG/1
TABLET ORAL
Qty: 90 TABLET | Refills: 1 | Status: SHIPPED | OUTPATIENT
Start: 2019-01-23 | End: 2019-10-03

## 2019-01-23 ASSESSMENT — ANXIETY QUESTIONNAIRES
5. BEING SO RESTLESS THAT IT IS HARD TO SIT STILL: SEVERAL DAYS
7. FEELING AFRAID AS IF SOMETHING AWFUL MIGHT HAPPEN: SEVERAL DAYS
2. NOT BEING ABLE TO STOP OR CONTROL WORRYING: NOT AT ALL
3. WORRYING TOO MUCH ABOUT DIFFERENT THINGS: SEVERAL DAYS
GAD7 TOTAL SCORE: 4
6. BECOMING EASILY ANNOYED OR IRRITABLE: SEVERAL DAYS
1. FEELING NERVOUS, ANXIOUS, OR ON EDGE: NOT AT ALL
IF YOU CHECKED OFF ANY PROBLEMS ON THIS QUESTIONNAIRE, HOW DIFFICULT HAVE THESE PROBLEMS MADE IT FOR YOU TO DO YOUR WORK, TAKE CARE OF THINGS AT HOME, OR GET ALONG WITH OTHER PEOPLE: NOT DIFFICULT AT ALL

## 2019-01-23 ASSESSMENT — PATIENT HEALTH QUESTIONNAIRE - PHQ9
5. POOR APPETITE OR OVEREATING: NOT AT ALL
SUM OF ALL RESPONSES TO PHQ QUESTIONS 1-9: 7

## 2019-01-23 ASSESSMENT — PAIN SCALES - GENERAL: PAINLEVEL: NO PAIN (0)

## 2019-01-23 NOTE — LETTER
My Depression Action Plan  Name: Lillie Sánchez   Date of Birth 1985  Date: 1/23/2019    My doctor: Kehr, Kristen M   My clinic: [facility]  [id number] [place] [id number] [phone]          GREEN    ZONE   Good Control    What it looks like:     Things are going generally well. You have normal up s and down s. You may even feel depressed from time to time, but bad moods usually last less than a day.   What you need to do:  1. Continue to care for yourself (see self care plan)  2. Check your depression survival kit and update it as needed  3. Follow your physician s recommendations including any medication.  4. Do not stop taking medication unless you consult with your physician first.           YELLOW         ZONE Getting Worse    What it looks like:     Depression is starting to interfere with your life.     It may be hard to get out of bed; you may be starting to isolate yourself from others.    Symptoms of depression are starting to last most all day and this has happened for several days.     You may have suicidal thoughts but they are not constant.   What you need to do:     1. Call your care team, your response to treatment will improve if you keep your care team informed of your progress. Yellow periods are signs an adjustment may need to be made.     2. Continue your self-care, even if you have to fake it!    3. Talk to someone in your support network    4. Open up your depression survival kit           RED    ZONE Medical Alert - Get Help    What it looks like:     Depression is seriously interfering with your life.     You may experience these or other symptoms: You can t get out of bed most days, can t work or engage in other necessary activities, you have trouble taking care of basic hygiene, or basic responsibilities, thoughts of suicide or death that will not go away, self-injurious behavior.     What you need to do:  1. Call your care team and  request a same-day appointment. If they are not available (weekends or after hours) call your local crisis line, emergency room or 911.            Depression Self Care Plan / Survival Kit    Self-Care for Depression  Here s the deal. Your body and mind are really not as separate as most people think.  What you do and think affects how you feel and how you feel influences what you do and think. This means if you do things that people who feel good do, it will help you feel better.  Sometimes this is all it takes.  There is also a place for medication and therapy depending on how severe your depression is, so be sure to consult with your medical provider and/ or Behavioral Health Consultant if your symptoms are worsening or not improving.     In order to better manage my stress, I will:    Exercise  Get some form of exercise, every day. This will help reduce pain and release endorphins, the  feel good  chemicals in your brain. This is almost as good as taking antidepressants!  This is not the same as joining a gym and then never going! (they count on that by the way ) It can be as simple as just going for a walk or doing some gardening, anything that will get you moving.      Hygiene   Maintain good hygiene (Get out of bed in the morning, Make your bed, Brush your teeth, Take a shower, and Get dressed like you were going to work, even if you are unemployed).  If your clothes don't fit try to get ones that do.    Diet  I will strive to eat foods that are good for me, drink plenty of water, and avoid excessive sugar, caffeine, alcohol, and other mood-altering substances.  Some foods that are helpful in depression are: complex carbohydrates, B vitamins, flaxseed, fish or fish oil, fresh fruits and vegetables.    Psychotherapy  I agree to participate in Individual Therapy (if recommended).    Medication  If prescribed medications, I agree to take them.  Missing doses can result in serious side effects.  I understand that  drinking alcohol, or other illicit drug use, may cause potential side effects.  I will not stop my medication abruptly without first discussing it with my provider.    Staying Connected With Others  I will stay in touch with my friends, family members, and my primary care provider/team.    Use your imagination  Be creative.  We all have a creative side; it doesn t matter if it s oil painting, sand castles, or mud pies! This will also kick up the endorphins.    Witness Beauty  (AKA stop and smell the roses) Take a look outside, even in mid-winter. Notice colors, textures. Watch the squirrels and birds.     Service to others  Be of service to others.  There is always someone else in need.  By helping others we can  get out of ourselves  and remember the really important things.  This also provides opportunities for practicing all the other parts of the program.    Humor  Laugh and be silly!  Adjust your TV habits for less news and crime-drama and more comedy.    Control your stress  Try breathing deep, massage therapy, biofeedback, and meditation. Find time to relax each day.     My support system    Clinic Contact:  Phone number:    Contact 1:  Phone number:    Contact 2:  Phone number:    [facility]/:  Phone number:    Therapist:  Phone number:    Local crisis center:    Phone number:    Other community support:  Phone number:

## 2019-01-23 NOTE — PROGRESS NOTES
SUBJECTIVE:   Lillie Sánchez is a 33 year old female who presents to clinic today for the following health issues:    She started the citalopram and at the 20 mg dose. She feels it is working well. She had fatigue when she first started the medication, but feels better now.       History of Present Illness     Depression & Anxiety Follow-up:     Depression/Anxiety:  Depression only    Status since last visit::  Improved    Other associated symptoms of depression::  YES    Significant life event::  No    Current substance use::  None    Anxiety/Panic symptoms::  No       Today's PHQ-9         PHQ-9 Total Score:         PHQ-9 Q9 Suicidal ideation:       Thoughts of suicide or self harm:      Self-harm Plan:        Self-harm Action:          Safety concerns for self or others:              Problem list and histories reviewed & adjusted, as indicated.  Additional history: as documented        Patient Active Problem List   Diagnosis     Hypothyroidism     CARDIOVASCULAR SCREENING; LDL GOAL LESS THAN 160     Migraine     Right shoulder pain     Tobacco abuse     Menorrhagia     Other specified hypothyroidism     Mild major depression (H)     Past Surgical History:   Procedure Laterality Date     NO HISTORY OF SURGERY         Social History     Tobacco Use     Smoking status: Light Tobacco Smoker     Packs/day: 0.50     Years: 10.00     Pack years: 5.00     Types: Cigarettes     Start date: 1/1/2005     Last attempt to quit: 9/4/2015     Years since quitting: 3.3     Smokeless tobacco: Never Used   Substance Use Topics     Alcohol use: Yes     Alcohol/week: 0.0 oz     Family History   Problem Relation Age of Onset     Breast Cancer Other 50        2 aunts     Mental Illness Other      Depression Sister      Depression Mother      Thyroid Disease Mother         multiple maternal relatives with hypothyroidism     Depression Maternal Grandmother      Thyroid Disease Maternal Grandmother          Current Outpatient  Medications   Medication Sig Dispense Refill     citalopram (CELEXA) 20 MG tablet 1 tablet orally daily 90 tablet 1     levothyroxine (SYNTHROID/LEVOTHROID) 100 MCG tablet Take 1 tablet (100 mcg) by mouth daily 90 tablet 3     LOW-OGESTREL 0.3-30 MG-MCG per tablet        Allergies   Allergen Reactions     Vicodin [Hydrocodone-Acetaminophen]      nausea       ROS:  Constitutional, HEENT, cardiovascular, pulmonary, GI, , musculoskeletal, neuro, skin, endocrine and psych systems are negative, except as otherwise noted.    OBJECTIVE:     /73   Pulse 79   Temp 98.1  F (36.7  C) (Oral)   Wt 69.4 kg (153 lb)   SpO2 99%   BMI 23.96 kg/m    Body mass index is 23.96 kg/m .  GENERAL: healthy, alert and no distress  EYES: Eyes grossly normal to inspection, PERRL and conjunctivae and sclerae normal  MS: no gross musculoskeletal defects noted, no edema  SKIN: no suspicious lesions or rashes  NEURO: Normal strength and tone, mentation intact and speech normal  PSYCH: mentation appears normal, affect normal/bright    Diagnostic Test Results:  none     ASSESSMENT/PLAN:       1. Mild major depression (H)  Stable, doing well with the citalopram 20 mg daily.   Refills given x 6 months.   Recheck with office visit or via [facility] at that time for refills.   - citalopram (CELEXA) 20 MG tablet; 1 tablet orally daily  Dispense: 90 tablet; Refill: 1      Kristen M. Kehr, PA-C  [facility]

## 2019-01-24 ASSESSMENT — ANXIETY QUESTIONNAIRES: GAD7 TOTAL SCORE: 4

## 2019-02-15 ENCOUNTER — HEALTH MAINTENANCE LETTER (OUTPATIENT)
Age: 34
End: 2019-02-15

## 2019-10-03 ENCOUNTER — OFFICE VISIT (OUTPATIENT)
Dept: FAMILY MEDICINE | Facility: CLINIC | Age: 34
End: 2019-10-03
Payer: COMMERCIAL

## 2019-10-03 VITALS
DIASTOLIC BLOOD PRESSURE: 74 MMHG | OXYGEN SATURATION: 98 % | HEART RATE: 84 BPM | SYSTOLIC BLOOD PRESSURE: 121 MMHG | HEIGHT: 67 IN | TEMPERATURE: 98.5 F | BODY MASS INDEX: 26.53 KG/M2 | WEIGHT: 169 LBS

## 2019-10-03 DIAGNOSIS — M25.50 PAIN IN JOINT, MULTIPLE SITES: ICD-10-CM

## 2019-10-03 DIAGNOSIS — F32.0 MILD MAJOR DEPRESSION (H): Primary | ICD-10-CM

## 2019-10-03 DIAGNOSIS — E03.8 OTHER SPECIFIED HYPOTHYROIDISM: ICD-10-CM

## 2019-10-03 LAB
ALBUMIN SERPL-MCNC: 4 G/DL (ref 3.4–5)
ALP SERPL-CCNC: 54 U/L (ref 40–150)
ALT SERPL W P-5'-P-CCNC: 39 U/L (ref 0–50)
ANION GAP SERPL CALCULATED.3IONS-SCNC: 5 MMOL/L (ref 3–14)
AST SERPL W P-5'-P-CCNC: 23 U/L (ref 0–45)
BASOPHILS # BLD AUTO: 0.1 10E9/L (ref 0–0.2)
BASOPHILS NFR BLD AUTO: 1 %
BILIRUB SERPL-MCNC: 0.3 MG/DL (ref 0.2–1.3)
BUN SERPL-MCNC: 13 MG/DL (ref 7–30)
CALCIUM SERPL-MCNC: 9.1 MG/DL (ref 8.5–10.1)
CHLORIDE SERPL-SCNC: 107 MMOL/L (ref 94–109)
CO2 SERPL-SCNC: 28 MMOL/L (ref 20–32)
CREAT SERPL-MCNC: 0.78 MG/DL (ref 0.52–1.04)
CRP SERPL-MCNC: 6.8 MG/L (ref 0–8)
DIFFERENTIAL METHOD BLD: NORMAL
EOSINOPHIL # BLD AUTO: 0.2 10E9/L (ref 0–0.7)
EOSINOPHIL NFR BLD AUTO: 3 %
ERYTHROCYTE [DISTWIDTH] IN BLOOD BY AUTOMATED COUNT: 13.1 % (ref 10–15)
GFR SERPL CREATININE-BSD FRML MDRD: >90 ML/MIN/{1.73_M2}
GLUCOSE SERPL-MCNC: 77 MG/DL (ref 70–99)
HCT VFR BLD AUTO: 42.3 % (ref 35–47)
HGB BLD-MCNC: 14.1 G/DL (ref 11.7–15.7)
LYMPHOCYTES # BLD AUTO: 2.5 10E9/L (ref 0.8–5.3)
LYMPHOCYTES NFR BLD AUTO: 31.4 %
MCH RBC QN AUTO: 29.4 PG (ref 26.5–33)
MCHC RBC AUTO-ENTMCNC: 33.3 G/DL (ref 31.5–36.5)
MCV RBC AUTO: 88 FL (ref 78–100)
MONOCYTES # BLD AUTO: 0.5 10E9/L (ref 0–1.3)
MONOCYTES NFR BLD AUTO: 6 %
NEUTROPHILS # BLD AUTO: 4.7 10E9/L (ref 1.6–8.3)
NEUTROPHILS NFR BLD AUTO: 58.6 %
PLATELET # BLD AUTO: 320 10E9/L (ref 150–450)
POTASSIUM SERPL-SCNC: 4 MMOL/L (ref 3.4–5.3)
PROT SERPL-MCNC: 7.6 G/DL (ref 6.8–8.8)
RBC # BLD AUTO: 4.79 10E12/L (ref 3.8–5.2)
SODIUM SERPL-SCNC: 140 MMOL/L (ref 133–144)
T4 FREE SERPL-MCNC: 0.81 NG/DL (ref 0.76–1.46)
TSH SERPL DL<=0.005 MIU/L-ACNC: 10.5 MU/L (ref 0.4–4)
WBC # BLD AUTO: 8 10E9/L (ref 4–11)

## 2019-10-03 PROCEDURE — 84439 ASSAY OF FREE THYROXINE: CPT | Performed by: PHYSICIAN ASSISTANT

## 2019-10-03 PROCEDURE — 86140 C-REACTIVE PROTEIN: CPT | Performed by: PHYSICIAN ASSISTANT

## 2019-10-03 PROCEDURE — 80053 COMPREHEN METABOLIC PANEL: CPT | Performed by: PHYSICIAN ASSISTANT

## 2019-10-03 PROCEDURE — 85025 COMPLETE CBC W/AUTO DIFF WBC: CPT | Performed by: PHYSICIAN ASSISTANT

## 2019-10-03 PROCEDURE — 99214 OFFICE O/P EST MOD 30 MIN: CPT | Performed by: PHYSICIAN ASSISTANT

## 2019-10-03 PROCEDURE — 86431 RHEUMATOID FACTOR QUANT: CPT | Performed by: PHYSICIAN ASSISTANT

## 2019-10-03 PROCEDURE — 84443 ASSAY THYROID STIM HORMONE: CPT | Performed by: PHYSICIAN ASSISTANT

## 2019-10-03 PROCEDURE — 36415 COLL VENOUS BLD VENIPUNCTURE: CPT | Performed by: PHYSICIAN ASSISTANT

## 2019-10-03 RX ORDER — CITALOPRAM HYDROBROMIDE 20 MG/1
TABLET ORAL
Qty: 90 TABLET | Refills: 1 | Status: SHIPPED | OUTPATIENT
Start: 2019-10-03 | End: 2020-08-09

## 2019-10-03 ASSESSMENT — ANXIETY QUESTIONNAIRES
6. BECOMING EASILY ANNOYED OR IRRITABLE: NOT AT ALL
7. FEELING AFRAID AS IF SOMETHING AWFUL MIGHT HAPPEN: NOT AT ALL
5. BEING SO RESTLESS THAT IT IS HARD TO SIT STILL: NOT AT ALL
GAD7 TOTAL SCORE: 1
7. FEELING AFRAID AS IF SOMETHING AWFUL MIGHT HAPPEN: NOT AT ALL
GAD7 TOTAL SCORE: 1
3. WORRYING TOO MUCH ABOUT DIFFERENT THINGS: NOT AT ALL
2. NOT BEING ABLE TO STOP OR CONTROL WORRYING: NOT AT ALL
4. TROUBLE RELAXING: NOT AT ALL
GAD7 TOTAL SCORE: 1
1. FEELING NERVOUS, ANXIOUS, OR ON EDGE: SEVERAL DAYS

## 2019-10-03 ASSESSMENT — MIFFLIN-ST. JEOR: SCORE: 1509.21

## 2019-10-03 ASSESSMENT — PATIENT HEALTH QUESTIONNAIRE - PHQ9
SUM OF ALL RESPONSES TO PHQ QUESTIONS 1-9: 4
SUM OF ALL RESPONSES TO PHQ QUESTIONS 1-9: 4
10. IF YOU CHECKED OFF ANY PROBLEMS, HOW DIFFICULT HAVE THESE PROBLEMS MADE IT FOR YOU TO DO YOUR WORK, TAKE CARE OF THINGS AT HOME, OR GET ALONG WITH OTHER PEOPLE: NOT DIFFICULT AT ALL

## 2019-10-03 ASSESSMENT — PAIN SCALES - GENERAL: PAINLEVEL: NO PAIN (0)

## 2019-10-03 NOTE — NURSING NOTE
"Chief Complaint   Patient presents with     Depression     Anxiety     Thyroid Problem     discuss       Initial /74   Pulse 84   Temp 98.5  F (36.9  C) (Oral)   Ht 1.702 m (5' 7\")   Wt 76.7 kg (169 lb)   SpO2 98%   BMI 26.47 kg/m   Estimated body mass index is 26.47 kg/m  as calculated from the following:    Height as of this encounter: 1.702 m (5' 7\").    Weight as of this encounter: 76.7 kg (169 lb).  Medication Reconciliation: complete    SELENE Martines MA    "

## 2019-10-03 NOTE — PROGRESS NOTES
Subjective     Lillie Sánchez is a 34 year old female who presents to clinic today for the following health issues:    History of Present Illness        Mental Health Follow-up:  Patient presents to follow-up on Depression & Anxiety.Patient's depression since last visit has been:  Better  The patient is not having other symptoms associated with depression.  Patient's anxiety since last visit has been:  Good  The patient is not having other symptoms associated with anxiety.  Any significant life events: No  Patient is not feeling anxious or having panic attacks.  Patient has no concerns about alcohol or drug use.     Social History  Tobacco Use    Smoking status: Light Tobacco Smoker      Packs/day: 0.50      Years: 10.00      Pack years: 5      Types: Cigarettes      Start date: 2005      Quit date: 2015      Years since quittin.0    Smokeless tobacco: Never Used  Alcohol use: Yes    Alcohol/week: 0.0 standard drinks  Drug use: No      Today's PHQ-9         PHQ-9 Total Score:     (P) 4   PHQ-9 Q9 Thoughts of better off dead/self-harm past 2 weeks :   (P) Not at all   Thoughts of suicide or self harm:      Self-harm Plan:        Self-harm Action:          Safety concerns for self or others:                Hypothyroidism Follow-up      Since last visit, patient describes the following symptoms: weight gain of 20 lbs and fatigue, night sweats and cold all day long per patient. She is having joint pains. Multiple joints. Mainly after exercise. She has stopped doing exercise because of the pain.     Patient Active Problem List   Diagnosis     Hypothyroidism     CARDIOVASCULAR SCREENING; LDL GOAL LESS THAN 160     Migraine     Right shoulder pain     Tobacco abuse     Menorrhagia     Other specified hypothyroidism     Mild major depression (H)     Past Surgical History:   Procedure Laterality Date     NO HISTORY OF SURGERY         Social History     Tobacco Use     Smoking status: Light Tobacco Smoker  "    Packs/day: 0.50     Years: 10.00     Pack years: 5.00     Types: Cigarettes     Start date: 2005     Last attempt to quit: 2015     Years since quittin.0     Smokeless tobacco: Never Used   Substance Use Topics     Alcohol use: Yes     Alcohol/week: 0.0 standard drinks     Family History   Problem Relation Age of Onset     Breast Cancer Other 50        2 aunts     Mental Illness Other      Depression Sister      Depression Mother      Thyroid Disease Mother         multiple maternal relatives with hypothyroidism     Depression Maternal Grandmother      Thyroid Disease Maternal Grandmother          Current Outpatient Medications   Medication Sig Dispense Refill     citalopram (CELEXA) 20 MG tablet 1 tablet orally daily 90 tablet 1     levothyroxine (SYNTHROID/LEVOTHROID) 100 MCG tablet Take 1 tablet (100 mcg) by mouth daily 90 tablet 3     LOW-OGESTREL 0.3-30 MG-MCG per tablet        Allergies   Allergen Reactions     Vicodin [Hydrocodone-Acetaminophen]      nausea     BP Readings from Last 3 Encounters:   10/03/19 121/74   19 109/73   18 124/85    Wt Readings from Last 3 Encounters:   10/03/19 76.7 kg (169 lb)   19 69.4 kg (153 lb)   18 68.9 kg (152 lb)                    Reviewed and updated as needed this visit by Provider  Tobacco  Allergies  Meds  Problems  Med Hx  Surg Hx  Fam Hx         Review of Systems   ROS COMP: Constitutional, HEENT, cardiovascular, pulmonary, GI, , musculoskeletal, neuro, skin, endocrine and psych systems are negative, except as otherwise noted.      Objective    /74   Pulse 84   Temp 98.5  F (36.9  C) (Oral)   Ht 1.702 m (5' 7\")   Wt 76.7 kg (169 lb)   SpO2 98%   BMI 26.47 kg/m    Body mass index is 26.47 kg/m .  Physical Exam   GENERAL: healthy, alert and no distress  MS: no gross musculoskeletal defects noted, no edema  SKIN: no suspicious lesions or rashes  PSYCH: mentation appears normal, affect normal/bright    Diagnostic " "Test Results:  Labs reviewed in Epic        Assessment & Plan     1. Mild major depression (H)  Stable, refills given x 6 months.   Plan follow up via Evisit at that time  - citalopram (CELEXA) 20 MG tablet; 1 tablet orally daily  Dispense: 90 tablet; Refill: 1      2. Other specified hypothyroidism  She is having the above symptoms.   Recheck today along with the other tests for her joint pains.   - TSH with free T4 reflex    3. Pain in joint, multiple sites  See above.   - CRP, inflammation  - Rheumatoid factor  - CBC with platelets and differential  - Comprehensive metabolic panel     Tobacco Cessation:   reports that she has been smoking cigarettes. She started smoking about 14 years ago. She has a 5.00 pack-year smoking history. She has never used smokeless tobacco.  Tobacco Cessation Action Plan: Information offered: Patient not interested at this time      BMI:   Estimated body mass index is 26.47 kg/m  as calculated from the following:    Height as of this encounter: 1.702 m (5' 7\").    Weight as of this encounter: 76.7 kg (169 lb).   Weight management plan: Discussed healthy diet and exercise guidelines          Return in about 6 months (around 4/3/2020) for depression / anxiety / evisit or office visit, depression / anxiety.    Kristen M. Kehr, PA-C  [facility]    "

## 2019-10-04 DIAGNOSIS — E03.8 OTHER SPECIFIED HYPOTHYROIDISM: ICD-10-CM

## 2019-10-04 LAB — RHEUMATOID FACT SER NEPH-ACNC: <20 IU/ML (ref 0–20)

## 2019-10-04 RX ORDER — LEVOTHYROXINE SODIUM 112 UG/1
112 TABLET ORAL DAILY
Qty: 90 TABLET | Refills: 3 | Status: SHIPPED | OUTPATIENT
Start: 2019-10-04 | End: 2020-01-13

## 2019-10-04 ASSESSMENT — PATIENT HEALTH QUESTIONNAIRE - PHQ9: SUM OF ALL RESPONSES TO PHQ QUESTIONS 1-9: 4

## 2019-10-04 ASSESSMENT — ANXIETY QUESTIONNAIRES: GAD7 TOTAL SCORE: 1

## 2019-10-07 ENCOUNTER — MYC MEDICAL ADVICE (OUTPATIENT)
Dept: FAMILY MEDICINE | Facility: CLINIC | Age: 34
End: 2019-10-07

## 2019-10-07 NOTE — TELEPHONE ENCOUNTER
To provider:  Do you have any specific recommendations for her flares.  She has tried OTC and naproxen.  Thank you. Sinai Leon R.N.

## 2019-10-07 NOTE — TELEPHONE ENCOUNTER
No. Hoping the pain is better after thyroid is back in normal range. Tylenol and ibuprofen / naproxen in the meantime. Kristen Kehr PA-C

## 2019-10-30 ENCOUNTER — OFFICE VISIT (OUTPATIENT)
Dept: FAMILY MEDICINE | Facility: CLINIC | Age: 34
End: 2019-10-30
Payer: COMMERCIAL

## 2019-10-30 VITALS
WEIGHT: 169 LBS | HEART RATE: 84 BPM | TEMPERATURE: 98.4 F | SYSTOLIC BLOOD PRESSURE: 124 MMHG | DIASTOLIC BLOOD PRESSURE: 82 MMHG | BODY MASS INDEX: 26.47 KG/M2 | OXYGEN SATURATION: 97 %

## 2019-10-30 DIAGNOSIS — M25.50 PAIN IN JOINT, MULTIPLE SITES: Primary | ICD-10-CM

## 2019-10-30 DIAGNOSIS — R53.83 FATIGUE, UNSPECIFIED TYPE: ICD-10-CM

## 2019-10-30 LAB — FERRITIN SERPL-MCNC: 17 NG/ML (ref 12–150)

## 2019-10-30 PROCEDURE — 82728 ASSAY OF FERRITIN: CPT | Performed by: PHYSICIAN ASSISTANT

## 2019-10-30 PROCEDURE — 36415 COLL VENOUS BLD VENIPUNCTURE: CPT | Performed by: PHYSICIAN ASSISTANT

## 2019-10-30 PROCEDURE — 99213 OFFICE O/P EST LOW 20 MIN: CPT | Performed by: PHYSICIAN ASSISTANT

## 2019-10-30 PROCEDURE — 86618 LYME DISEASE ANTIBODY: CPT | Performed by: PHYSICIAN ASSISTANT

## 2019-10-30 ASSESSMENT — PAIN SCALES - GENERAL: PAINLEVEL: NO PAIN (0)

## 2019-10-30 NOTE — NURSING NOTE
"Chief Complaint   Patient presents with     Joint Pain       Initial /82   Pulse 84   Temp 98.4  F (36.9  C) (Oral)   Wt 76.7 kg (169 lb)   SpO2 97%   BMI 26.47 kg/m   Estimated body mass index is 26.47 kg/m  as calculated from the following:    Height as of 10/3/19: 1.702 m (5' 7\").    Weight as of this encounter: 76.7 kg (169 lb).  Medication Reconciliation: complete    SELENE Martines MA    "

## 2019-10-30 NOTE — PROGRESS NOTES
Subjective     Lillie Sánchez is a 34 year old female who presents to clinic today for the following health issues:  She was seen 3 weeks ago and her thyroid medication was adjusted.   She is now taking 112 mcg of the levothyroxine.   She contacted the nurse this morning with an update and feels that the fatigue and joint aches are getting worse. Joint aches continue to be transient. Yesterday, it was her shoulder and today it is her left 5th finger and foot. She will notice mild swelling when her joints hurt. No other changes with her joints. She also has extreme fatigue. She is able to fall asleep right away. She is up with her 3 year old x 2 in the night, but just to tell her 3 year old to get back in her own bed and go to sleep and has been doing that for years. There has been no new changes with her sleep patterns.     HPI   Joint Pain    Onset: Mid 2019    Description:   Location: varies, but today shoulders, right foot and left 5th finger.   Character: varies    Intensity: varies    Progression of Symptoms: worse    Accompanying Signs & Symptoms:  Other symptoms: numbness, tingling, weakness, warmth, swelling and redness    History:   Previous similar pain:       Precipitating factors:   Trauma or overuse: no     Alleviating factors:  Improved by: nothing    Therapies Tried and outcome:         Patient Active Problem List   Diagnosis     Hypothyroidism     CARDIOVASCULAR SCREENING; LDL GOAL LESS THAN 160     Migraine     Right shoulder pain     Tobacco abuse     Menorrhagia     Other specified hypothyroidism     Mild major depression (H)     Past Surgical History:   Procedure Laterality Date     NO HISTORY OF SURGERY         Social History     Tobacco Use     Smoking status: Light Tobacco Smoker     Packs/day: 0.50     Years: 10.00     Pack years: 5.00     Types: Cigarettes     Start date: 2005     Last attempt to quit: 2015     Years since quittin.1     Smokeless tobacco: Never Used    Substance Use Topics     Alcohol use: Yes     Alcohol/week: 0.0 standard drinks     Family History   Problem Relation Age of Onset     Breast Cancer Other 50        2 aunts     Mental Illness Other      Depression Sister      Depression Mother      Thyroid Disease Mother         multiple maternal relatives with hypothyroidism     Depression Maternal Grandmother      Thyroid Disease Maternal Grandmother          Current Outpatient Medications   Medication Sig Dispense Refill     citalopram (CELEXA) 20 MG tablet 1 tablet orally daily 90 tablet 1     levothyroxine (SYNTHROID/LEVOTHROID) 112 MCG tablet Take 1 tablet (112 mcg) by mouth daily 90 tablet 3     LOW-OGESTREL 0.3-30 MG-MCG per tablet        Allergies   Allergen Reactions     Vicodin [Hydrocodone-Acetaminophen]      nausea     BP Readings from Last 3 Encounters:   10/30/19 124/82   10/03/19 121/74   01/23/19 109/73    Wt Readings from Last 3 Encounters:   10/30/19 76.7 kg (169 lb)   10/03/19 76.7 kg (169 lb)   01/23/19 69.4 kg (153 lb)                    Reviewed and updated as needed this visit by Provider         Review of Systems   ROS COMP: Constitutional, HEENT, cardiovascular, pulmonary, GI, , musculoskeletal, neuro, skin, endocrine and psych systems are negative, except as otherwise noted.      Objective    /82   Pulse 84   Temp 98.4  F (36.9  C) (Oral)   Wt 76.7 kg (169 lb)   SpO2 97%   BMI 26.47 kg/m    Body mass index is 26.47 kg/m .  Physical Exam   GENERAL: healthy, alert and no distress  EYES: Eyes grossly normal to inspection, PERRL and conjunctivae and sclerae normal  NECK: no adenopathy, no asymmetry, masses, or scars and thyroid normal to palpation  RESP: lungs clear to auscultation - no rales, rhonchi or wheezes  CV: regular rate and rhythm, normal S1 S2, no S3 or S4, no murmur, click or rub, no peripheral edema and peripheral pulses strong  MS: no gross musculoskeletal defects noted, no edema  SKIN: no suspicious lesions or  rashes  NEURO: Normal strength and tone, mentation intact and speech normal  PSYCH: mentation appears normal, affect normal/bright    Diagnostic Test Results:  Labs reviewed in Epic        Assessment & Plan     1. Pain in joint, multiple sites  2. Fatigue, unspecified type  Check the following tests.   The thyroid dose adjustment is going to take time  She will also make an appointment for consultation with Rheumatology   - Lyme Disease Romelia with reflex to WB Serum  - RHEUMATOLOGY REFERRAL  - Ferritin        Return for specialty / make an appointment with Rheumatology.    Kristen M. Kehr, PA-C  [facility]

## 2019-10-31 LAB — B BURGDOR IGG+IGM SER QL: 0.17 (ref 0–0.89)

## 2019-11-22 ENCOUNTER — TELEPHONE (OUTPATIENT)
Dept: FAMILY MEDICINE | Facility: CLINIC | Age: 34
End: 2019-11-22

## 2019-11-22 DIAGNOSIS — E03.8 OTHER SPECIFIED HYPOTHYROIDISM: Primary | ICD-10-CM

## 2019-11-22 NOTE — TELEPHONE ENCOUNTER
Patient states she would like a referral to [facility], [address] [place] [address] for her hypothyroidism.    Thank you.

## 2019-11-22 NOTE — TELEPHONE ENCOUNTER
Information below is reviewed with  Kristen Kehr, PA-C, Verbal Orders okay for referral to Endocrinologist.     Call to patient informed via voicemail referral is placed.    Per protocol, will route encounter to be cosigned by provider for Verbal Orders.  Stefanie Reid RN

## 2020-01-14 ENCOUNTER — MYC MEDICAL ADVICE (OUTPATIENT)
Dept: FAMILY MEDICINE | Facility: CLINIC | Age: 35
End: 2020-01-14

## 2020-01-14 DIAGNOSIS — M25.50 PAIN IN JOINT, MULTIPLE SITES: Primary | ICD-10-CM

## 2020-01-14 NOTE — TELEPHONE ENCOUNTER
I will place the referral. Please fax the referral and lab results. Thank you.   Kristen Kehr PA-C

## 2020-01-14 NOTE — TELEPHONE ENCOUNTER
Referral and Labs were faxed to [facility] at [phone]. Notified patient by [employer].  CORRINA Yanez

## 2020-01-20 ENCOUNTER — TELEPHONE (OUTPATIENT)
Dept: FAMILY MEDICINE | Facility: CLINIC | Age: 35
End: 2020-01-20

## 2020-01-20 NOTE — TELEPHONE ENCOUNTER
My Chart message sent along with PHQ9 and MU for patient to complete and return.    PHQ-9 due now for patient   Index date:       12/12/2018  Index PHQ9 :   21  FU start date:   10/13/2019  FU End date :   02/10/2020    Rosalia Davis, [facility]

## 2020-01-28 ENCOUNTER — TRANSFERRED RECORDS (OUTPATIENT)
Dept: HEALTH INFORMATION MANAGEMENT | Facility: CLINIC | Age: 35
End: 2020-01-28

## 2020-01-31 DIAGNOSIS — R70.0 ELEVATED SEDIMENTATION RATE: Primary | ICD-10-CM

## 2020-01-31 DIAGNOSIS — Z79.1 ENCOUNTER FOR LONG-TERM (CURRENT) USE OF NON-STEROIDAL ANTI-INFLAMMATORIES: ICD-10-CM

## 2020-01-31 DIAGNOSIS — M12.89 TRANSIENT ARTHROPATHY, MULTIPLE SITES: ICD-10-CM

## 2020-01-31 LAB
ALT SERPL W P-5'-P-CCNC: 31 U/L (ref 0–50)
AST SERPL W P-5'-P-CCNC: 13 U/L (ref 0–45)
CREAT SERPL-MCNC: 0.59 MG/DL (ref 0.52–1.04)
CRP SERPL-MCNC: 15.4 MG/L (ref 0–8)
ERYTHROCYTE [DISTWIDTH] IN BLOOD BY AUTOMATED COUNT: 12.8 % (ref 10–15)
GFR SERPL CREATININE-BSD FRML MDRD: >90 ML/MIN/{1.73_M2}
HCT VFR BLD AUTO: 38.4 % (ref 35–47)
HGB BLD-MCNC: 12.7 G/DL (ref 11.7–15.7)
MCH RBC QN AUTO: 28.2 PG (ref 26.5–33)
MCHC RBC AUTO-ENTMCNC: 33.1 G/DL (ref 31.5–36.5)
MCV RBC AUTO: 85 FL (ref 78–100)
PLATELET # BLD AUTO: 402 10E9/L (ref 150–450)
RBC # BLD AUTO: 4.51 10E12/L (ref 3.8–5.2)
WBC # BLD AUTO: 10.3 10E9/L (ref 4–11)

## 2020-01-31 PROCEDURE — 86235 NUCLEAR ANTIGEN ANTIBODY: CPT | Mod: 91 | Performed by: INTERNAL MEDICINE

## 2020-01-31 PROCEDURE — 36415 COLL VENOUS BLD VENIPUNCTURE: CPT | Performed by: INTERNAL MEDICINE

## 2020-01-31 PROCEDURE — 86200 CCP ANTIBODY: CPT | Performed by: INTERNAL MEDICINE

## 2020-01-31 PROCEDURE — 84450 TRANSFERASE (AST) (SGOT): CPT | Performed by: INTERNAL MEDICINE

## 2020-01-31 PROCEDURE — 84460 ALANINE AMINO (ALT) (SGPT): CPT | Performed by: INTERNAL MEDICINE

## 2020-01-31 PROCEDURE — 86140 C-REACTIVE PROTEIN: CPT | Performed by: INTERNAL MEDICINE

## 2020-01-31 PROCEDURE — 86038 ANTINUCLEAR ANTIBODIES: CPT | Performed by: INTERNAL MEDICINE

## 2020-01-31 PROCEDURE — 85027 COMPLETE CBC AUTOMATED: CPT | Performed by: INTERNAL MEDICINE

## 2020-01-31 PROCEDURE — 82565 ASSAY OF CREATININE: CPT | Performed by: INTERNAL MEDICINE

## 2020-01-31 PROCEDURE — 86235 NUCLEAR ANTIGEN ANTIBODY: CPT | Performed by: INTERNAL MEDICINE

## 2020-01-31 PROCEDURE — 86039 ANTINUCLEAR ANTIBODIES (ANA): CPT | Performed by: INTERNAL MEDICINE

## 2020-02-02 LAB
ENA SS-A IGG SER IA-ACNC: <0.2 AI (ref 0–0.9)
ENA SS-B IGG SER IA-ACNC: <0.2 AI (ref 0–0.9)

## 2020-02-03 LAB
ANA PAT SER IF-IMP: ABNORMAL
ANA SER QL IF: POSITIVE
ANA TITR SER IF: ABNORMAL {TITER}

## 2020-02-04 LAB — CCP AB SER IA-ACNC: >340 U/ML

## 2020-02-24 ENCOUNTER — HEALTH MAINTENANCE LETTER (OUTPATIENT)
Age: 35
End: 2020-02-24

## 2020-05-05 ENCOUNTER — TRANSFERRED RECORDS (OUTPATIENT)
Dept: HEALTH INFORMATION MANAGEMENT | Facility: CLINIC | Age: 35
End: 2020-05-05

## 2020-06-16 ENCOUNTER — TRANSFERRED RECORDS (OUTPATIENT)
Dept: HEALTH INFORMATION MANAGEMENT | Facility: CLINIC | Age: 35
End: 2020-06-16

## 2020-06-16 DIAGNOSIS — Z79.1 ENCOUNTER FOR LONG-TERM (CURRENT) USE OF NON-STEROIDAL ANTI-INFLAMMATORIES: ICD-10-CM

## 2020-06-16 DIAGNOSIS — I73.00 RAYNAUD'S SYNDROME: ICD-10-CM

## 2020-06-16 DIAGNOSIS — Z79.1 ENCOUNTER FOR LONG-TERM (CURRENT) USE OF NON-STEROIDAL ANTI-INFLAMMATORIES: Primary | ICD-10-CM

## 2020-06-16 DIAGNOSIS — R70.0 ELEVATED SEDIMENTATION RATE: ICD-10-CM

## 2020-06-16 DIAGNOSIS — M12.89 TRANSIENT ARTHROPATHY, MULTIPLE SITES: ICD-10-CM

## 2020-06-16 LAB
ALT SERPL W P-5'-P-CCNC: 27 U/L (ref 0–50)
AST SERPL W P-5'-P-CCNC: 14 U/L (ref 0–45)
CREAT SERPL-MCNC: 0.82 MG/DL (ref 0.52–1.04)
CRP SERPL-MCNC: 126 MG/L (ref 0–8)
ERYTHROCYTE [DISTWIDTH] IN BLOOD BY AUTOMATED COUNT: 14.9 % (ref 10–15)
GFR SERPL CREATININE-BSD FRML MDRD: >90 ML/MIN/{1.73_M2}
HCT VFR BLD AUTO: 42.5 % (ref 35–47)
HGB BLD-MCNC: 13.7 G/DL (ref 11.7–15.7)
MCH RBC QN AUTO: 28.8 PG (ref 26.5–33)
MCHC RBC AUTO-ENTMCNC: 32.2 G/DL (ref 31.5–36.5)
MCV RBC AUTO: 89 FL (ref 78–100)
PLATELET # BLD AUTO: 434 10E9/L (ref 150–450)
RBC # BLD AUTO: 4.76 10E12/L (ref 3.8–5.2)
WBC # BLD AUTO: 11.1 10E9/L (ref 4–11)

## 2020-06-16 PROCEDURE — 82565 ASSAY OF CREATININE: CPT | Performed by: INTERNAL MEDICINE

## 2020-06-16 PROCEDURE — 85027 COMPLETE CBC AUTOMATED: CPT | Performed by: INTERNAL MEDICINE

## 2020-06-16 PROCEDURE — 84460 ALANINE AMINO (ALT) (SGPT): CPT | Performed by: INTERNAL MEDICINE

## 2020-06-16 PROCEDURE — 86481 TB AG RESPONSE T-CELL SUSP: CPT | Performed by: INTERNAL MEDICINE

## 2020-06-16 PROCEDURE — 36415 COLL VENOUS BLD VENIPUNCTURE: CPT | Performed by: INTERNAL MEDICINE

## 2020-06-16 PROCEDURE — 86140 C-REACTIVE PROTEIN: CPT | Performed by: INTERNAL MEDICINE

## 2020-06-16 PROCEDURE — 84450 TRANSFERASE (AST) (SGOT): CPT | Performed by: INTERNAL MEDICINE

## 2020-06-19 LAB
GAMMA INTERFERON BACKGROUND BLD IA-ACNC: 0.05 IU/ML
M TB IFN-G BLD-IMP: NEGATIVE
M TB IFN-G CD4+ BCKGRND COR BLD-ACNC: 1.68 IU/ML
MITOGEN IGNF BCKGRD COR BLD-ACNC: 0 IU/ML
MITOGEN IGNF BCKGRD COR BLD-ACNC: 0 IU/ML

## 2020-06-25 ENCOUNTER — TRANSFERRED RECORDS (OUTPATIENT)
Dept: HEALTH INFORMATION MANAGEMENT | Facility: CLINIC | Age: 35
End: 2020-06-25

## 2020-12-13 ENCOUNTER — HEALTH MAINTENANCE LETTER (OUTPATIENT)
Age: 35
End: 2020-12-13

## 2021-01-26 ENCOUNTER — TRANSFERRED RECORDS (OUTPATIENT)
Dept: HEALTH INFORMATION MANAGEMENT | Facility: CLINIC | Age: 36
End: 2021-01-26

## 2021-02-22 NOTE — PROGRESS NOTES
Lillie is a 35 year old who is being evaluated via a billable video visit.      How would you like to obtain your AVS? MyChart  If the video visit is dropped, the invitation should be resent by: Text to cell phone: [phone]  Will anyone else be joining your video visit? No    Video Start Time: 10:44 AM    Assessment & Plan     Mild major depression (H)  Stable, refills given x 6 months.   - citalopram (CELEXA) 20 MG tablet; 1 tablet orally daily    Other specified hypothyroidism  She will be transitioning back to primary care.   She has a lab appointment in March and will have her thyroid test completed at that time.   30 days of medication given at her current dose.   Plan refills after results are back.   - **TSH with free T4 reflex FUTURE anytime; Future  - levothyroxine (SYNTHROID/LEVOTHROID) 137 MCG tablet; Take 1 tablet (137 mcg) by mouth daily    Rheumatoid arthritis, involving unspecified site, unspecified whether rheumatoid factor present (H)  Managed by Rheumatology, she will be starting a new injectable medication within the next month.               Return in about 6 months (around 2021) for medication check in the clinic.    Kristen M. Kehr, PA-C  [facility]   Lillie is a 35 year old who presents for the following health issues     HPI       Depression Followup    How are you doing with your depression since your last visit? No change    Are you having other symptoms that might be associated with depression? Yes:  Night sweats     Have you had a significant life event?  No     Are you feeling anxious or having panic attacks?   No    Do you have any concerns with your use of alcohol or other drugs? No    Social History     Tobacco Use     Smoking status: Light Tobacco Smoker     Packs/day: 0.50     Years: 10.00     Pack years: 5.00     Types: Cigarettes     Start date: 2005     Last attempt to quit: 2015     Years since quittin.4     Smokeless  tobacco: Never Used   Substance Use Topics     Alcohol use: Yes     Alcohol/week: 0.0 standard drinks     Drug use: No     PHQ 10/3/2019 8/12/2020 2/23/2021   PHQ-9 Total Score 4 4 3   Q9: Thoughts of better off dead/self-harm past 2 weeks Not at all Not at all Not at all     MU-7 SCORE 10/3/2019 8/12/2020 2/23/2021   Total Score 1 (minimal anxiety) 0 (minimal anxiety) -   Total Score 1 0 4     Last PHQ-9 2/23/2021   1.  Little interest or pleasure in doing things 1   2.  Feeling down, depressed, or hopeless 1   3.  Trouble falling or staying asleep, or sleeping too much 0   4.  Feeling tired or having little energy 1   5.  Poor appetite or overeating 0   6.  Feeling bad about yourself 0   7.  Trouble concentrating 0   8.  Moving slowly or restless 0   Q9: Thoughts of better off dead/self-harm past 2 weeks 0   PHQ-9 Total Score 3   Difficulty at work, home, or with people Not difficult at all     MU-7  2/23/2021   1. Feeling nervous, anxious, or on edge 1   2. Not being able to stop or control worrying 1   3. Worrying too much about different things 1   4. Trouble relaxing 0   5. Being so restless that it is hard to sit still 1   6. Becoming easily annoyed or irritable 0   7. Feeling afraid, as if something awful might happen 0   MU-7 Total Score 4   If you checked any problems, how difficult have they made it for you to do your work, take care of things at home, or get along with other people? Not difficult at all       Suicide Assessment Five-step Evaluation and Treatment (SAFE-T)      How many servings of fruits and vegetables do you eat daily?  0-1    On average, how many sweetened beverages do you drink each day (Examples: soda, juice, sweet tea, etc.  Do NOT count diet or artificially sweetened beverages)?   0    How many days per week do you exercise enough to make your heart beat faster? 3 or less    How many minutes a day do you exercise enough to make your heart beat faster? 9 or less    How many days  per week do you miss taking your medication? 0      Additional Problems:  Hypothyroid: she was previously seeing Endocrinology, but will be transferring back to primary care. She has been taking levothyroxine 137 mcg daily and feels well. She is due for thyroid testing, but is out of her medication.       Review of Systems   Constitutional, HEENT, cardiovascular, pulmonary, GI, , musculoskeletal, neuro, skin, endocrine and psych systems are negative, except as otherwise noted.      Objective           Vitals:  No vitals were obtained today due to virtual visit.    Physical Exam   GENERAL: Healthy, alert and no distress  EYES: Eyes grossly normal to inspection.  No discharge or erythema, or obvious scleral/conjunctival abnormalities.  RESP: No audible wheeze, cough, or visible cyanosis.  No visible retractions or increased work of breathing.    SKIN: Visible skin clear. No significant rash, abnormal pigmentation or lesions.  NEURO: Cranial nerves grossly intact.  Mentation and speech appropriate for age.  PSYCH: Mentation appears normal, affect normal/bright, judgement and insight intact, normal speech and appearance well-groomed.                Video-Visit Details    Type of service:  Video Visit    Video End Time:10:52 AM    Originating Location (pt. Location): Home    Distant Location (provider location):  [facility]     Platform used for Video Visit: Virtual Web

## 2021-02-23 ENCOUNTER — VIRTUAL VISIT (OUTPATIENT)
Dept: FAMILY MEDICINE | Facility: CLINIC | Age: 36
End: 2021-02-23
Payer: COMMERCIAL

## 2021-02-23 DIAGNOSIS — E03.8 OTHER SPECIFIED HYPOTHYROIDISM: ICD-10-CM

## 2021-02-23 DIAGNOSIS — F32.0 MILD MAJOR DEPRESSION (H): Primary | ICD-10-CM

## 2021-02-23 DIAGNOSIS — M06.9 RHEUMATOID ARTHRITIS, INVOLVING UNSPECIFIED SITE, UNSPECIFIED WHETHER RHEUMATOID FACTOR PRESENT (H): ICD-10-CM

## 2021-02-23 PROCEDURE — 99214 OFFICE O/P EST MOD 30 MIN: CPT | Mod: 95 | Performed by: PHYSICIAN ASSISTANT

## 2021-02-23 PROCEDURE — 96127 BRIEF EMOTIONAL/BEHAV ASSMT: CPT | Performed by: PHYSICIAN ASSISTANT

## 2021-02-23 RX ORDER — PREDNISOLONE SODIUM PHOSPHATE 15 MG/5ML
SOLUTION ORAL
COMMUNITY
Start: 2020-10-31 | End: 2022-04-25

## 2021-02-23 RX ORDER — LEVOTHYROXINE SODIUM 137 UG/1
150 TABLET ORAL DAILY
Qty: 30 TABLET | Refills: 0 | Status: SHIPPED | OUTPATIENT
Start: 2021-02-23 | End: 2021-03-08

## 2021-02-23 RX ORDER — CITALOPRAM HYDROBROMIDE 20 MG/1
TABLET ORAL
Qty: 90 TABLET | Refills: 1 | Status: SHIPPED | OUTPATIENT
Start: 2021-02-23 | End: 2021-10-20

## 2021-02-23 RX ORDER — FOLIC ACID 1 MG/1
TABLET ORAL
COMMUNITY
Start: 2021-01-26 | End: 2022-04-25

## 2021-02-23 ASSESSMENT — ANXIETY QUESTIONNAIRES
3. WORRYING TOO MUCH ABOUT DIFFERENT THINGS: SEVERAL DAYS
6. BECOMING EASILY ANNOYED OR IRRITABLE: NOT AT ALL
GAD7 TOTAL SCORE: 4
5. BEING SO RESTLESS THAT IT IS HARD TO SIT STILL: SEVERAL DAYS
IF YOU CHECKED OFF ANY PROBLEMS ON THIS QUESTIONNAIRE, HOW DIFFICULT HAVE THESE PROBLEMS MADE IT FOR YOU TO DO YOUR WORK, TAKE CARE OF THINGS AT HOME, OR GET ALONG WITH OTHER PEOPLE: NOT DIFFICULT AT ALL
7. FEELING AFRAID AS IF SOMETHING AWFUL MIGHT HAPPEN: NOT AT ALL
1. FEELING NERVOUS, ANXIOUS, OR ON EDGE: SEVERAL DAYS
2. NOT BEING ABLE TO STOP OR CONTROL WORRYING: SEVERAL DAYS

## 2021-02-23 ASSESSMENT — PATIENT HEALTH QUESTIONNAIRE - PHQ9
5. POOR APPETITE OR OVEREATING: NOT AT ALL
SUM OF ALL RESPONSES TO PHQ QUESTIONS 1-9: 3

## 2021-02-24 ASSESSMENT — ANXIETY QUESTIONNAIRES: GAD7 TOTAL SCORE: 4

## 2021-03-04 DIAGNOSIS — Z79.899 HIGH RISK MEDICATION USE: Primary | ICD-10-CM

## 2021-03-04 DIAGNOSIS — Z79.52 LONG TERM CURRENT USE OF SYSTEMIC STEROIDS: ICD-10-CM

## 2021-03-04 DIAGNOSIS — M05.79 SEROPOSITIVE RHEUMATOID ARTHRITIS OF MULTIPLE SITES (H): ICD-10-CM

## 2021-03-04 DIAGNOSIS — E03.8 OTHER SPECIFIED HYPOTHYROIDISM: ICD-10-CM

## 2021-03-04 LAB
ALT SERPL W P-5'-P-CCNC: 42 U/L (ref 0–50)
AST SERPL W P-5'-P-CCNC: 16 U/L (ref 0–45)
CREAT SERPL-MCNC: 0.86 MG/DL (ref 0.52–1.04)
CRP SERPL-MCNC: 30.5 MG/L (ref 0–8)
ERYTHROCYTE [DISTWIDTH] IN BLOOD BY AUTOMATED COUNT: 18.3 % (ref 10–15)
ERYTHROCYTE [SEDIMENTATION RATE] IN BLOOD BY WESTERGREN METHOD: 12 MM/H (ref 0–20)
GFR SERPL CREATININE-BSD FRML MDRD: 88 ML/MIN/{1.73_M2}
HCT VFR BLD AUTO: 42.9 % (ref 35–47)
HGB BLD-MCNC: 13.4 G/DL (ref 11.7–15.7)
MCH RBC QN AUTO: 27.3 PG (ref 26.5–33)
MCHC RBC AUTO-ENTMCNC: 31.2 G/DL (ref 31.5–36.5)
MCV RBC AUTO: 87 FL (ref 78–100)
PLATELET # BLD AUTO: 418 10E9/L (ref 150–450)
RBC # BLD AUTO: 4.91 10E12/L (ref 3.8–5.2)
TSH SERPL DL<=0.005 MIU/L-ACNC: 3.27 MU/L (ref 0.4–4)
WBC # BLD AUTO: 10.8 10E9/L (ref 4–11)

## 2021-03-04 PROCEDURE — 36415 COLL VENOUS BLD VENIPUNCTURE: CPT | Performed by: PHYSICIAN ASSISTANT

## 2021-03-04 PROCEDURE — 84450 TRANSFERASE (AST) (SGOT): CPT | Performed by: INTERNAL MEDICINE

## 2021-03-04 PROCEDURE — 85027 COMPLETE CBC AUTOMATED: CPT | Performed by: INTERNAL MEDICINE

## 2021-03-04 PROCEDURE — 86140 C-REACTIVE PROTEIN: CPT | Performed by: INTERNAL MEDICINE

## 2021-03-04 PROCEDURE — 84460 ALANINE AMINO (ALT) (SGPT): CPT | Performed by: INTERNAL MEDICINE

## 2021-03-04 PROCEDURE — 82565 ASSAY OF CREATININE: CPT | Performed by: INTERNAL MEDICINE

## 2021-03-04 PROCEDURE — 85652 RBC SED RATE AUTOMATED: CPT | Performed by: INTERNAL MEDICINE

## 2021-03-04 PROCEDURE — 84443 ASSAY THYROID STIM HORMONE: CPT | Performed by: PHYSICIAN ASSISTANT

## 2021-03-08 RX ORDER — LEVOTHYROXINE SODIUM 137 UG/1
150 TABLET ORAL DAILY
Qty: 90 TABLET | Refills: 3 | Status: SHIPPED | OUTPATIENT
Start: 2021-03-08 | End: 2022-06-30

## 2021-04-17 ENCOUNTER — HEALTH MAINTENANCE LETTER (OUTPATIENT)
Age: 36
End: 2021-04-17

## 2021-06-03 ENCOUNTER — TRANSFERRED RECORDS (OUTPATIENT)
Dept: HEALTH INFORMATION MANAGEMENT | Facility: CLINIC | Age: 36
End: 2021-06-03

## 2021-09-26 ENCOUNTER — HEALTH MAINTENANCE LETTER (OUTPATIENT)
Age: 36
End: 2021-09-26

## 2021-10-19 PROBLEM — F32.9 MAJOR DEPRESSION: Status: ACTIVE | Noted: 2018-12-11

## 2021-10-20 ENCOUNTER — OFFICE VISIT (OUTPATIENT)
Dept: FAMILY MEDICINE | Facility: CLINIC | Age: 36
End: 2021-10-20
Payer: COMMERCIAL

## 2021-10-20 VITALS
OXYGEN SATURATION: 100 % | HEART RATE: 76 BPM | SYSTOLIC BLOOD PRESSURE: 109 MMHG | BODY MASS INDEX: 24.01 KG/M2 | WEIGHT: 153 LBS | DIASTOLIC BLOOD PRESSURE: 72 MMHG | HEIGHT: 67 IN | TEMPERATURE: 98.3 F

## 2021-10-20 DIAGNOSIS — M06.9 RHEUMATOID ARTHRITIS, INVOLVING UNSPECIFIED SITE, UNSPECIFIED WHETHER RHEUMATOID FACTOR PRESENT (H): ICD-10-CM

## 2021-10-20 DIAGNOSIS — F32.0 MILD MAJOR DEPRESSION (H): Primary | ICD-10-CM

## 2021-10-20 DIAGNOSIS — B36.0 TINEA VERSICOLOR: ICD-10-CM

## 2021-10-20 PROCEDURE — 99213 OFFICE O/P EST LOW 20 MIN: CPT | Performed by: PHYSICIAN ASSISTANT

## 2021-10-20 RX ORDER — KETOCONAZOLE 20 MG/ML
SHAMPOO TOPICAL
Qty: 120 ML | Refills: 11 | Status: SHIPPED | OUTPATIENT
Start: 2021-10-20 | End: 2024-01-10

## 2021-10-20 RX ORDER — CITALOPRAM HYDROBROMIDE 20 MG/1
TABLET ORAL
Qty: 90 TABLET | Refills: 1 | Status: SHIPPED | OUTPATIENT
Start: 2021-10-20 | End: 2022-06-29 | Stop reason: SINTOL

## 2021-10-20 ASSESSMENT — MIFFLIN-ST. JEOR: SCORE: 1426.63

## 2021-10-20 ASSESSMENT — ANXIETY QUESTIONNAIRES
6. BECOMING EASILY ANNOYED OR IRRITABLE: MORE THAN HALF THE DAYS
1. FEELING NERVOUS, ANXIOUS, OR ON EDGE: SEVERAL DAYS
IF YOU CHECKED OFF ANY PROBLEMS ON THIS QUESTIONNAIRE, HOW DIFFICULT HAVE THESE PROBLEMS MADE IT FOR YOU TO DO YOUR WORK, TAKE CARE OF THINGS AT HOME, OR GET ALONG WITH OTHER PEOPLE: SOMEWHAT DIFFICULT
3. WORRYING TOO MUCH ABOUT DIFFERENT THINGS: SEVERAL DAYS
7. FEELING AFRAID AS IF SOMETHING AWFUL MIGHT HAPPEN: SEVERAL DAYS
5. BEING SO RESTLESS THAT IT IS HARD TO SIT STILL: NOT AT ALL
2. NOT BEING ABLE TO STOP OR CONTROL WORRYING: SEVERAL DAYS
GAD7 TOTAL SCORE: 7

## 2021-10-20 ASSESSMENT — PAIN SCALES - GENERAL: PAINLEVEL: NO PAIN (0)

## 2021-10-20 ASSESSMENT — PATIENT HEALTH QUESTIONNAIRE - PHQ9
SUM OF ALL RESPONSES TO PHQ QUESTIONS 1-9: 6
5. POOR APPETITE OR OVEREATING: SEVERAL DAYS

## 2021-10-20 NOTE — PROGRESS NOTES
Assessment & Plan     Mild major depression (H)  Stable, continue on the citalopram.   Refills x 6 months.   She is due for an appointment and thyroid testing again at that time.   - citalopram (CELEXA) 20 MG tablet; 1 tablet orally daily    Tinea versicolor  - ketoconazole (NIZORAL) 2 % external shampoo; Apply daily to skin in the shower / leave for 5 minutes / then wash off    Rheumatoid arthritis, involving unspecified site, unspecified whether rheumatoid factor present (H)  - Rheumatology Referral; Future                 Return in about 6 months (around 2022) for depression / anxiety, Lab Work.    Kristen M. Kehr, PA-C [facility]    Gladys Moreira is a 36 year old who presents for the following health issues     HPI     Depression and Anxiety Follow-Up    How are you doing with your depression since your last visit? ok    How are you doing with your anxiety since your last visit?  ok    Are you having other symptoms that might be associated with depression or anxiety? No    Have you had a significant life event? No     Do you have any concerns with your use of alcohol or other drugs? No    Social History     Tobacco Use     Smoking status: Light Tobacco Smoker     Packs/day: 0.50     Years: 10.00     Pack years: 5.00     Types: Cigarettes     Start date: 2005     Last attempt to quit: 2015     Years since quittin.1     Smokeless tobacco: Never Used   Substance Use Topics     Alcohol use: Yes     Alcohol/week: 0.0 standard drinks     Drug use: No     PHQ 10/3/2019 2020 2021   PHQ-9 Total Score 4 4 3   Q9: Thoughts of better off dead/self-harm past 2 weeks Not at all Not at all Not at all     MU-7 SCORE 10/3/2019 2020 2021   Total Score 1 (minimal anxiety) 0 (minimal anxiety) -   Total Score 1 0 4     Last PHQ-9 10/20/2021   1.  Little interest or pleasure in doing things 1   2.  Feeling down, depressed, or hopeless 1   3.  Trouble falling or  "staying asleep, or sleeping too much 1   4.  Feeling tired or having little energy 1   5.  Poor appetite or overeating 1   6.  Feeling bad about yourself 0   7.  Trouble concentrating 1   8.  Moving slowly or restless 0   Q9: Thoughts of better off dead/self-harm past 2 weeks 0   PHQ-9 Total Score 6   Difficulty at work, home, or with people Somewhat difficult     MU-7  10/20/2021   1. Feeling nervous, anxious, or on edge 1   2. Not being able to stop or control worrying 1   3. Worrying too much about different things 1   4. Trouble relaxing 1   5. Being so restless that it is hard to sit still 0   6. Becoming easily annoyed or irritable 2   7. Feeling afraid, as if something awful might happen 1   MU-7 Total Score 7   If you checked any problems, how difficult have they made it for you to do your work, take care of things at home, or get along with other people? Somewhat difficult       Suicide Assessment Five-step Evaluation and Treatment (SAFE-T)          How many days per week do you miss taking your medication? 1 time only    Derm concern she has had a rash all over her back and upper arms for months. Red blotchy.     Referral for Rheumatoid. She would like to establish with Dr. Cintron in the [place] system    Review of Systems   Constitutional, HEENT, cardiovascular, pulmonary, GI, , musculoskeletal, neuro, skin, endocrine and psych systems are negative, except as otherwise noted.      Objective    /72   Pulse 76   Temp 98.3  F (36.8  C) (Tympanic)   Ht 1.702 m (5' 7\")   Wt 69.4 kg (153 lb)   SpO2 100%   BMI 23.96 kg/m    Body mass index is 23.96 kg/m .  Physical Exam   GENERAL: healthy, alert and no distress  SKIN: tinea versicolor over her back and upper arms.   PSYCH: mentation appears normal, affect normal/bright                "

## 2021-10-20 NOTE — NURSING NOTE
"Chief Complaint   Patient presents with     Depression     Anxiety     Derm Problem     Referral       Initial /72   Pulse 76   Temp 98.3  F (36.8  C) (Tympanic)   Ht 1.702 m (5' 7\")   Wt 69.4 kg (153 lb)   SpO2 100%   BMI 23.96 kg/m   Estimated body mass index is 23.96 kg/m  as calculated from the following:    Height as of this encounter: 1.702 m (5' 7\").    Weight as of this encounter: 69.4 kg (153 lb).  Medication Reconciliation: complete    SELENE Martines MA    "

## 2021-10-21 ASSESSMENT — ANXIETY QUESTIONNAIRES: GAD7 TOTAL SCORE: 7

## 2022-01-11 ENCOUNTER — TRANSFERRED RECORDS (OUTPATIENT)
Dept: HEALTH INFORMATION MANAGEMENT | Facility: CLINIC | Age: 37
End: 2022-01-11
Payer: COMMERCIAL

## 2022-03-07 ENCOUNTER — OFFICE VISIT (OUTPATIENT)
Dept: URGENT CARE | Facility: URGENT CARE | Age: 37
End: 2022-03-07
Payer: COMMERCIAL

## 2022-03-07 VITALS
BODY MASS INDEX: 21.46 KG/M2 | WEIGHT: 137 LBS | TEMPERATURE: 98.6 F | DIASTOLIC BLOOD PRESSURE: 83 MMHG | HEART RATE: 103 BPM | OXYGEN SATURATION: 97 % | SYSTOLIC BLOOD PRESSURE: 128 MMHG

## 2022-03-07 DIAGNOSIS — M79.661 RIGHT CALF PAIN: Primary | ICD-10-CM

## 2022-03-07 DIAGNOSIS — R00.0 TACHYCARDIA: ICD-10-CM

## 2022-03-07 DIAGNOSIS — M25.561 POSTERIOR RIGHT KNEE PAIN: ICD-10-CM

## 2022-03-07 PROCEDURE — 99214 OFFICE O/P EST MOD 30 MIN: CPT | Performed by: NURSE PRACTITIONER

## 2022-03-07 RX ORDER — HYDROXYCHLOROQUINE SULFATE 200 MG/1
200 TABLET, FILM COATED ORAL DAILY
COMMUNITY
Start: 2022-02-15 | End: 2022-04-25

## 2022-03-07 NOTE — PROGRESS NOTES
Assessment & Plan     Right calf pain    Posterior right knee pain    Tachycardia       With severe calf pain and posterior knee pain with tachycardia recommend further evaluation in ED to rule out DVT vs baker's cyst. Patient agreeable and declines ambulance. She is discharged in stable condition via wheelchair.       Bia Lucas NP  [facility] [place]          Gladys Moreira is a 36 year old female who presents to clinic today with her significant other for the following health issues:  Chief Complaint   Patient presents with     Knee Pain     Swelling in back of right knee most recent since last friday      MS Injury/Pain    Onset of symptoms was 4 day(s) ago which severely worsened today.  Location: right leg from calf, posterior knee, posterior thigh  Context: No known injury  Course of symptoms is worsening.    Severity severe 9/10  Current and Associated symptoms: Pain, Swelling, Redness, Tenderness and Decreased range of motion, numbness, tingling  Denies  Bruising and Warmth  Aggravating Factors: walking and weight-bearing  Therapies to improve symptoms include: 650 mg tylenol 4 hours ago hasn't helped at all  She has a history of rheumatoid arthritis and has been on prednisone for a couple years and also is immunosuppressed with methotrexate.  Denies chest pain, shortness of breath, history of DVT.      Problem list, Medication list, Allergies, and Medical history reviewed in EPIC.    ROS:  Review of systems negative except for noted above        Objective    /83   Pulse 103   Temp 98.6  F (37  C) (Oral)   Wt 62.1 kg (137 lb)   SpO2 97%   BMI 21.46 kg/m    Physical Exam  Constitutional:       General: She is not in acute distress.     Appearance: She is not toxic-appearing or diaphoretic.   Cardiovascular:      Rate and Rhythm: Regular rhythm. Tachycardia present.   Musculoskeletal:      Comments: Right calf tenderness with palpation and mild swelling. Swelling  throughout bilateral knees, right posterior knee swelling and tenderness. Tenderness with palpation right posterior thigh   Skin:     General: Skin is warm and dry.      Findings: Erythema present. No bruising.      Comments: Erythema throughout right calf without increased warmth   Neurological:      Mental Status: She is alert.      Sensory: No sensory deficit.      Gait: Gait abnormal.      Comments: Walking with a limp

## 2022-03-14 ENCOUNTER — TRANSFERRED RECORDS (OUTPATIENT)
Dept: HEALTH INFORMATION MANAGEMENT | Facility: CLINIC | Age: 37
End: 2022-03-14
Payer: COMMERCIAL

## 2022-04-25 ENCOUNTER — OFFICE VISIT (OUTPATIENT)
Dept: RHEUMATOLOGY | Facility: CLINIC | Age: 37
End: 2022-04-25
Attending: PHYSICIAN ASSISTANT
Payer: COMMERCIAL

## 2022-04-25 VITALS
SYSTOLIC BLOOD PRESSURE: 120 MMHG | BODY MASS INDEX: 22.65 KG/M2 | DIASTOLIC BLOOD PRESSURE: 78 MMHG | WEIGHT: 144.6 LBS | OXYGEN SATURATION: 97 % | HEART RATE: 101 BPM

## 2022-04-25 DIAGNOSIS — M06.9 RHEUMATOID ARTHRITIS, INVOLVING UNSPECIFIED SITE, UNSPECIFIED WHETHER RHEUMATOID FACTOR PRESENT (H): Primary | ICD-10-CM

## 2022-04-25 DIAGNOSIS — Z79.899 HIGH RISK MEDICATION USE: ICD-10-CM

## 2022-04-25 LAB
ALBUMIN SERPL-MCNC: 3.5 G/DL (ref 3.4–5)
ALP SERPL-CCNC: 76 U/L (ref 40–150)
ALT SERPL W P-5'-P-CCNC: 23 U/L (ref 0–50)
AST SERPL W P-5'-P-CCNC: 17 U/L (ref 0–45)
BASOPHILS # BLD AUTO: 0.1 10E3/UL (ref 0–0.2)
BASOPHILS NFR BLD AUTO: 1 %
BILIRUB DIRECT SERPL-MCNC: 0.1 MG/DL (ref 0–0.2)
BILIRUB SERPL-MCNC: 0.3 MG/DL (ref 0.2–1.3)
CREAT SERPL-MCNC: 0.74 MG/DL (ref 0.52–1.04)
CRP SERPL-MCNC: 32.9 MG/L (ref 0–8)
EOSINOPHIL # BLD AUTO: 0.1 10E3/UL (ref 0–0.7)
EOSINOPHIL NFR BLD AUTO: 1 %
ERYTHROCYTE [DISTWIDTH] IN BLOOD BY AUTOMATED COUNT: 16.5 % (ref 10–15)
ERYTHROCYTE [SEDIMENTATION RATE] IN BLOOD BY WESTERGREN METHOD: 10 MM/HR (ref 0–20)
FASTING STATUS PATIENT QL REPORTED: NO
GFR SERPL CREATININE-BSD FRML MDRD: >90 ML/MIN/1.73M2
GLUCOSE BLD-MCNC: 82 MG/DL (ref 70–99)
HCT VFR BLD AUTO: 44.9 % (ref 35–47)
HGB BLD-MCNC: 14.5 G/DL (ref 11.7–15.7)
LYMPHOCYTES # BLD AUTO: 2.8 10E3/UL (ref 0.8–5.3)
LYMPHOCYTES NFR BLD AUTO: 31 %
MCH RBC QN AUTO: 27.8 PG (ref 26.5–33)
MCHC RBC AUTO-ENTMCNC: 32.3 G/DL (ref 31.5–36.5)
MCV RBC AUTO: 86 FL (ref 78–100)
MONOCYTES # BLD AUTO: 0.8 10E3/UL (ref 0–1.3)
MONOCYTES NFR BLD AUTO: 8 %
NEUTROPHILS # BLD AUTO: 5.4 10E3/UL (ref 1.6–8.3)
NEUTROPHILS NFR BLD AUTO: 59 %
PLATELET # BLD AUTO: 495 10E3/UL (ref 150–450)
PROT SERPL-MCNC: 7.7 G/DL (ref 6.8–8.8)
RBC # BLD AUTO: 5.21 10E6/UL (ref 3.8–5.2)
WBC # BLD AUTO: 9.2 10E3/UL (ref 4–11)

## 2022-04-25 PROCEDURE — 85652 RBC SED RATE AUTOMATED: CPT | Performed by: INTERNAL MEDICINE

## 2022-04-25 PROCEDURE — 85025 COMPLETE CBC W/AUTO DIFF WBC: CPT | Performed by: INTERNAL MEDICINE

## 2022-04-25 PROCEDURE — 99205 OFFICE O/P NEW HI 60 MIN: CPT | Performed by: INTERNAL MEDICINE

## 2022-04-25 PROCEDURE — 86140 C-REACTIVE PROTEIN: CPT | Performed by: INTERNAL MEDICINE

## 2022-04-25 PROCEDURE — 82947 ASSAY GLUCOSE BLOOD QUANT: CPT | Performed by: INTERNAL MEDICINE

## 2022-04-25 PROCEDURE — 87340 HEPATITIS B SURFACE AG IA: CPT | Performed by: INTERNAL MEDICINE

## 2022-04-25 PROCEDURE — 82565 ASSAY OF CREATININE: CPT | Performed by: INTERNAL MEDICINE

## 2022-04-25 PROCEDURE — 86704 HEP B CORE ANTIBODY TOTAL: CPT | Performed by: INTERNAL MEDICINE

## 2022-04-25 PROCEDURE — 36415 COLL VENOUS BLD VENIPUNCTURE: CPT | Performed by: INTERNAL MEDICINE

## 2022-04-25 PROCEDURE — 86803 HEPATITIS C AB TEST: CPT | Performed by: INTERNAL MEDICINE

## 2022-04-25 PROCEDURE — 80076 HEPATIC FUNCTION PANEL: CPT | Performed by: INTERNAL MEDICINE

## 2022-04-25 RX ORDER — FOLIC ACID 1 MG/1
1 TABLET ORAL DAILY
Qty: 100 TABLET | Refills: 3 | Status: SHIPPED | OUTPATIENT
Start: 2022-04-25 | End: 2024-01-11

## 2022-04-25 RX ORDER — SULFASALAZINE 500 MG/1
TABLET ORAL
Qty: 120 TABLET | Refills: 3 | Status: SHIPPED | OUTPATIENT
Start: 2022-04-25 | End: 2024-01-11

## 2022-04-25 RX ORDER — METHYLPREDNISOLONE 4 MG/1
4 TABLET ORAL DAILY
Qty: 30 TABLET | Refills: 3 | Status: SHIPPED | OUTPATIENT
Start: 2022-04-25 | End: 2024-01-11

## 2022-04-25 RX ORDER — HYDROXYCHLOROQUINE SULFATE 200 MG/1
TABLET, FILM COATED ORAL
Qty: 45 TABLET | Refills: 3 | Status: SHIPPED | OUTPATIENT
Start: 2022-04-25 | End: 2024-01-11

## 2022-04-25 RX ORDER — METHOTREXATE 2.5 MG/1
20 TABLET ORAL WEEKLY
Qty: 32 TABLET | Refills: 3 | Status: SHIPPED | OUTPATIENT
Start: 2022-04-25 | End: 2024-01-11

## 2022-04-25 NOTE — PROGRESS NOTES
Rheumatology Clinic Visit      Lillie Sánchez MRN# [id number]   YOB: 1985 Age: 36 year old      Date of visit: 4/25/22   PCP: Kehr, Kristen M    Chief Complaint   Patient presents with:  Consult: RA. Having pain, stiffness and swelling    Assessment and Plan     1.  Seropositive rheumatoid arthritis (RF negative, CCP >340): Reportedly diagnosed with rheumatoid arthritis in 2019.  Previously followed by Dr. Murphy at an outside clinic.  Establish care with me today, 4/25/2022: Severe inflammatory arthritis affecting the knees and wrists; intermittent symptoms at the sternum.  Previously on Humira (ineffective), Enbrel (ineffective).  Currently on methotrexate 17.5 mg once weekly and hydroxychloroquine 200 mg daily, and methylprednisolone 6 mg daily (reportedly prednisone was not as effective).  Reviewed the diagnosis of rheumatoid arthritis and the treatment options.  Severe rheumatoid arthritis currently.  She denies ever having been on higher methotrexate doses.  Treatment options include increasing methotrexate, adding sulfasalazine, increasing hydroxychloroquine dose, addition of biologic DMARD or targeted synthetic DMARDs.  After thorough discussion we will add sulfasalazine today, increase hydroxychloroquine, and increase methotrexate.  If needed at follow-up then plan to consider biologic DMARD at that time.  Currently she is hesitant to use self injections.  Chronic illness, progressive, severe  - Increase methotrexate from 17.5 mg oral once weekly, to 20 mg once weekly  - Continue folic acid 1mg daily  - Increase hydroxychloroquine from 200 mg daily, to 300 mg daily (reportedly had last eye exam at [facility] in [facility]; repeat eye exam including 10-2 VF and SD-OCT is required)  - Start sulfasalazine 500 mg twice daily x7 days, then 1000 mg twice daily thereafter  - Reduce methylprednisolone from 6 mg daily to 4 mg daily  - Labs today: CBC, creatinine, hepatic panel, ESR, CRP,  hepatitis B/C, glucose  - Labs monthly [id number]: CBC, Cr, Hepatic Panel  - Labs in 3 months: CBC, Creatinine, Hepatic Panel, ESR, CRP, glucose    High risk medication requiring intensive toxicity monitoring at least quarterly: labs ordered include CBC, Creatinine, Hepatic panel to monitor for cytopenia and hepatotoxicity; checking creatinine as it affects clearance of medication.     # Pregnancy plans: Reports being on birth control and does not desire conception within the next 1 year.  She verbalized understanding about the teratogenic potential of DMARDs and that if any change in pregnancy status or plans for pregnancy that she should notify me immediately, and preferably plan ahead so that medications can be adjusted.    # Sulfasalazine Risks and Benefits: The risks and benefits of sulfasalazine were discussed in detail and the patient verbalized understanding.  The risks discussed include, but are not limited to, the risk for hypersensitivity, anaphylaxis, anaphylactoid reactions, infections, bone marrow suppression,  hepatotoxicity, nausea, vomiting, and GI upset.  Oligospermia may occur in males.  I encouraged reviewing the package insert and asking any questions about the medication.      # Methotrexate Risks and Benefits: The risks and benefits of methotrexate were discussed in detail and the patient verbalized understanding.  The risks discussed include, but are not limited to, the risk for hypersensitivity, anaphylaxis, anaphylactoid reactions, infections, bone marrow suppression, renal toxicity, hepatotoxicity, pulmonary toxicity, malignancy, impaired fertility, GI upset, alopecia, and oral and nasal sores.  Folic acid supplementation is recommended during methotrexate therapy to help prevent some of the side effects. Pregnancy prevention and planning was discussed; it is recommended that women of childbearing potential use reliable contraception during therapy.  The risks of taking both methotrexate and  alcohol were reviewed; complete alcohol avoidance was discussed.  Routine laboratory monitoring is required during methotrexate therapy. Taking MTX once weekly, all within a 24 hour period was stressed and the patient verbalized this instruction back to me.  I encouraged reviewing the package insert and asking any questions about the medication.    # Prednisone/methylprednisolone risks and Benefits: The risks and benefits of methylprednisolone were discussed in detail and the patient verbalized understanding.  The risks discussed include, but are not limited to, weight gain, fluid retention, impaired wound healing, hyperglycemia, adrenal suppression, GI upset, peptic ulcer, hepatotoxicity, aseptic necrosis of the femoral and humeral heads, osteoporosis, myopathy, tendon rupture (particularly Achilles tendon), ocular changes including an increased intraocular pressure.  I encouraged reviewing the package insert and asking any questions about the medication.      # Hydroxychloroquine (Plaquenil) Risks and Benefits:  The risks and benefits of hydroxychloroquine were discussed in detail and the patient verbalized understanding; the patient also verbalized agreement to get the required ophthalmologic toxicity monitoring.  The risks discussed include, but are not limited to, the risk for hypersensitivity, anaphylaxis, anaphylactoid reactions, irreversible retinal damage, rare hematologic reactions, and rare cardiomyopathy.  Patients with G6PD deficiency or hepatic impairment may be at an increased risk for adverse effects.  I encouraged reviewing the package insert and asking any questions about the medication.       # Adalimumab (Humira) Risks and Benefits: The risks and benefits of adalimumab were discussed in detail and the patient verbalized understanding.  The risks discussed include, but are not limited to, the risk for hypersensitivity, anaphylaxis, anaphylactoid reactions, an increased risk for serious infections  leading to hospitalization or death, a possible increased risk for lymphoma and other malignancies, a possible worsening of demyelinating diseases, a possible worsening of heart failure, risk for cytopenias, risk for drug induced lupus, possible reactivation of hepatitis B, and possible reactivation of latent tuberculosis.  Subcutaneous injections may result in injection site reactions and/or pain at the site of injection.  The most common adverse reactions are infections, injection site reactions, headache, and rash.  It was discussed that the medication would need to be discontinued if a serious infection develops.  It was discussed that live vaccinations should not be received while using adalimumab or within 30 days prior to starting adalimumab.  I encouraged reviewing the package insert and asking any questions about the medication.        2.  Vaccinations: Vaccinations reviewed with Ms. Sánchez.  Risks and benefits of vaccinations were discussed.    - Influenza: encouraged yearly vaccination  - [id number]: refused by patient  - [id number]: refused by patient  - COVID19: Refused by patient.  Discussed that she is at higher risk for complications from COVID-19 infection if she were to have a COVID-19 infection.    Total minutes spent in evaluation with patient, documentation, , and review of pertinent studies and chart notes: 64     Ms. Sánchez verbalized agreement with and understanding of the rational for the diagnosis and treatment plan.  All questions were answered to best of my ability and the patient's satisfaction. Ms. Sánchez was advised to contact the clinic with any questions that may arise after the clinic visit.      Thank you for involving me in the care of the patient    Return to clinic: 3 -  4 months      HPI   Lillie Sánchez is a 36 year old female with a past medical history significant for depression, tobacco use, migraines, hypothyroidism, and rheumatoid arthritis who  presents for initial rheumatology evaluation in this clinic for rheumatoid arthritis.     6/3/2021 [facility] rheumatology note by Dr. Ronny Murphy documents rheumatoid arthritis.  On methotrexate and Enbrel.  Enbrel was started in February.  Humira was used previously but symptoms are returning 1 week prior to next dose.  Skin rash developed so it was recommended to stop TNF inhibitors and she was to see dermatology for evaluation of the rash.  Methotrexate was changed from oral to subcutaneous.  Methotrexate changed to 0.7 mL injections.  Prednisone was started at 15 mg via 15 mg per 5 mL solution     Today, 4/25/2022: Lillie reports being diagnosed with rheumatoid arthritis in 2019.  Currently with terrible joint pain involving the knees, wrists, and sternum.  Currently on prednisone 2mL of the 15mg/5mL solution of methylprednisolone, methotrexate 17.5mg PO wkly (says that SQ was not more effective in the past, and has not been on a higher dose of methotrexate), hydroxychloroquine 200mg daily (follows with [facility] in [facility], with last eye exam in late 2020 or early 2021; planning to have a repeat eye exam soon).  2 weeks ago had a ruptured Baker's cyst on the right knee; was seen by orthopedics where fluid was removed from both knees and both knees were injected with some improvement.  Still with occasional burning pain going down the right calf.  Did not find Humira or Enbrel to be effective.    Denies fevers, chills, nausea, vomiting, constipation, diarrhea. No abdominal pain.  No black or bloody stools.. No chest pain/pressure, palpitations, or shortness of breath. No LE swelling. No neck pain. No oral or nasal sores.  No rash. No sicca symptoms. No photosensitivity or photophobia. No eye pain or redness. No history of inflammatory eye or bowel disease.  No history of DVT, pulmonary embolism, or miscarriage.   No history of serositis.  No history of Raynaud's Phenomenon.   No known seizure disorder.  No known renal disorder.      Tobacco: Former smoker  EtOH: Rarely  Drugs: None    ROS   12 point review of system was completed and negative except as noted in the HPI     Active Problem List     Patient Active Problem List   Diagnosis     Hypothyroidism     CARDIOVASCULAR SCREENING; LDL GOAL LESS THAN 160     Migraine     Right shoulder pain     Tobacco abuse     Menorrhagia     Other specified hypothyroidism     Mild major depression (H)     Rheumatoid arthritis (H)     Past Medical History     Past Medical History:   Diagnosis Date     NO ACTIVE PROBLEMS      Rheumatoid arthritis (H) 2/23/2021     Thyroid disease      Past Surgical History     Past Surgical History:   Procedure Laterality Date     NO HISTORY OF SURGERY       Allergy     Allergies   Allergen Reactions     Vicodin [Hydrocodone-Acetaminophen]      nausea     Current Medication List     Current Outpatient Medications   Medication Sig     cholecalciferol (VITAMIN D3) 25 mcg (1000 units) capsule Take 1,000 Units by mouth     citalopram (CELEXA) 20 MG tablet 1 tablet orally daily     folic acid (FOLVITE) 1 MG tablet      hydroxychloroquine (PLAQUENIL) 200 MG tablet Take 200 mg by mouth daily     levothyroxine (SYNTHROID/LEVOTHROID) 137 MCG tablet Take 1 tablet (137 mcg) by mouth daily     LOW-OGESTREL 0.3-30 MG-MCG per tablet      methotrexate 2.5 MG tablet TAKE 7 TABLETS BY MOUTH WEEKLY     prednisoLONE (ORAPRED) 15 MG/5 ML solution TK 8ML PO QAM FOR 7 DAYS THEN DECREASE BY 1 ML Q 7 DAYS     ketoconazole (NIZORAL) 2 % external shampoo Apply daily to skin in the shower / leave for 5 minutes / then wash off (Patient not taking: Reported on 4/25/2022)     norgestrel-ethinyl estradiol (LO/OVRAL) 0.3-30 MG-MCG tablet Take 1 tablet by mouth daily     prednisoLONE (PRELONE) 15 MG/5ML syrup  (Patient not taking: Reported on 4/25/2022)     VITAMIN D PO  (Patient not taking: Reported on 4/25/2022)     No current facility-administered  "medications for this visit.         Social History   See HPI    Family History     Family History   Problem Relation Age of Onset     Breast Cancer Other 50        2 aunts     Mental Illness Other      Depression Sister      Depression Mother      Thyroid Disease Mother         multiple maternal relatives with hypothyroidism     Depression Maternal Grandmother      Thyroid Disease Maternal Grandmother      Physical Exam     Temp Readings from Last 3 Encounters:   03/07/22 98.6  F (37  C) (Oral)   10/20/21 98.3  F (36.8  C) (Tympanic)   10/30/19 98.4  F (36.9  C) (Oral)     BP Readings from Last 5 Encounters:   04/25/22 120/78   03/07/22 128/83   10/20/21 109/72   10/30/19 124/82   10/03/19 121/74     Pulse Readings from Last 1 Encounters:   04/25/22 101     Resp Readings from Last 1 Encounters:   10/18/18 16     Estimated body mass index is 22.65 kg/m  as calculated from the following:    Height as of 10/20/21: 1.702 m (5' 7\").    Weight as of this encounter: 65.6 kg (144 lb 9.6 oz).      GEN: NAD.   HEENT:  Anicteric, noninjected sclera. No obvious external lesions of the ear and nose. Hearing intact.  CV: S1, S2. RRR. No m/r/g  PULM: No increased work of breathing. CTA bilaterally   MSK: MCPs, PIPs, DIPs without swelling or tenderness to palpation.  Both wrists with synovial swelling and tenderness to palpation.  Elbows and shoulders without swelling or tenderness to palpation.  Hips nontender to palpation.  Knees diffusely tender to palpation but no increased warmth or overlying erythema; no clear effusion.  Ankles and MTPs without swelling or tenderness palpation.     SKIN: No rash or jaundice seen  PSYCH: Alert. Appropriate.        Labs / Imaging (select studies)     RF/CCP  Recent Labs   Lab Test 01/31/20  1505 10/03/19  1228   CCPIGG >340*  --    RHF  --  <20     CHARLEE  Recent Labs   Lab Test 01/31/20  1505   SEDA Positive*   ANAP1 SPECKLED   ANAT1 1:160     RNP/Sm/SSA/SSB  Recent Labs   Lab Test 01/31/20  1505 "   SSAIGG <0.2   SSBIGG <0.2     CBC  Recent Labs   Lab Test 03/04/21  1440 06/16/20  1401 01/31/20  1505 10/03/19  1228   WBC 10.8 11.1* 10.3 8.0   RBC 4.91 4.76 4.51 4.79   HGB 13.4 13.7 12.7 14.1   HCT 42.9 42.5 38.4 42.3   MCV 87 89 85 88   RDW 18.3* 14.9 12.8 13.1    434 402 320   MCH 27.3 28.8 28.2 29.4   MCHC 31.2* 32.2 33.1 33.3   NEUTROPHIL  --   --   --  58.6   LYMPH  --   --   --  31.4   MONOCYTE  --   --   --  6.0   EOSINOPHIL  --   --   --  3.0   BASOPHIL  --   --   --  1.0   ANEU  --   --   --  4.7   ALYM  --   --   --  2.5   DONOVAN  --   --   --  0.5   AEOS  --   --   --  0.2   ABAS  --   --   --  0.1     CMP  Recent Labs   Lab Test 03/04/21  1440 06/16/20  1401 01/31/20  1505 10/03/19  1228   NA  --   --   --  140   POTASSIUM  --   --   --  4.0   CHLORIDE  --   --   --  107   CO2  --   --   --  28   ANIONGAP  --   --   --  5   GLC  --   --   --  77   BUN  --   --   --  13   CR 0.86 0.82 0.59 0.78   GFRESTIMATED 88 >90 >90 >90   GFRESTBLACK >90 >90 >90 >90   BHAVIN  --   --   --  9.1   BILITOTAL  --   --   --  0.3   ALBUMIN  --   --   --  4.0   PROTTOTAL  --   --   --  7.6   ALKPHOS  --   --   --  54   AST 16 14 13 23   ALT 42 27 31 39     Iron Studies  Recent Labs   Lab Test 10/30/19  1232 12/16/14  1246   CHUN 17 34     Calcium/VitaminD  Recent Labs   Lab Test 10/03/19  1228   BHAVIN 9.1     ESR/CRP  Recent Labs   Lab Test 03/04/21  1440 06/16/20  1401 01/31/20  1505   SED 12  --   --    CRP 30.5* 126.0* 15.4*     TSH/T4  Recent Labs   Lab Test 03/04/21  1440 10/03/19  1228 11/07/18  1525 10/18/18  1300 02/26/16  1522 12/21/15  1704   TSH 3.27 10.50* 3.61 5.32*   < > 4.69*   T4  --  0.81  --  1.00  --  0.88    < > = values in this interval not displayed.     Lyme ab screening  Recent Labs   Lab Test 10/30/19  1232   LYMEGM 0.17     Tuberculosis Screening  Recent Labs   Lab Test 06/16/20  1401   TBRES Negative       Immunization History     Immunization History   Administered Date(s) Administered      HPV 08/17/2010, 10/18/2010, 10/12/2011     Influenza (IIV3) PF 10/12/2011     TD (ADULT, 7+) 08/06/2009     TDAP Vaccine (Adacel) 10/12/2011          Chart documentation done in part with Dragon Voice recognition Software. Although reviewed after completion, some word and grammatical error may remain.    Tanner Cintron MD

## 2022-04-25 NOTE — PATIENT INSTRUCTIONS
RHEUMATOLOGY    Dr. Tanner Cintron    [address] [place] [address]  Phone number: [phone]  Fax number: [phone]      Thank you for choosing [facility]!    Zoey Hernandez CMA Rheumatology

## 2022-04-25 NOTE — NURSING NOTE
RAPID3 (0-30) Cumulative Score  21.0          RAPID3 Weighted Score (divide #4 by 3 and that is the weighted score)  7.0

## 2022-04-26 LAB
HBV CORE AB SERPL QL IA: NONREACTIVE
HBV SURFACE AG SERPL QL IA: NONREACTIVE
HCV AB SERPL QL IA: NONREACTIVE

## 2022-05-08 ENCOUNTER — HEALTH MAINTENANCE LETTER (OUTPATIENT)
Age: 37
End: 2022-05-08

## 2022-05-23 ENCOUNTER — LAB (OUTPATIENT)
Dept: LAB | Facility: CLINIC | Age: 37
End: 2022-05-23
Payer: COMMERCIAL

## 2022-05-23 DIAGNOSIS — Z79.899 HIGH RISK MEDICATION USE: ICD-10-CM

## 2022-05-23 DIAGNOSIS — M06.9 RHEUMATOID ARTHRITIS, INVOLVING UNSPECIFIED SITE, UNSPECIFIED WHETHER RHEUMATOID FACTOR PRESENT (H): ICD-10-CM

## 2022-05-23 LAB
ALBUMIN SERPL-MCNC: 3.4 G/DL (ref 3.4–5)
ALP SERPL-CCNC: 52 U/L (ref 40–150)
ALT SERPL W P-5'-P-CCNC: 21 U/L (ref 0–50)
AST SERPL W P-5'-P-CCNC: 19 U/L (ref 0–45)
BASOPHILS # BLD AUTO: 0.1 10E3/UL (ref 0–0.2)
BASOPHILS NFR BLD AUTO: 2 %
BILIRUB DIRECT SERPL-MCNC: 0.1 MG/DL (ref 0–0.2)
BILIRUB SERPL-MCNC: 0.4 MG/DL (ref 0.2–1.3)
CREAT SERPL-MCNC: 0.83 MG/DL (ref 0.52–1.04)
CRP SERPL-MCNC: 40.8 MG/L (ref 0–8)
EOSINOPHIL # BLD AUTO: 0.1 10E3/UL (ref 0–0.7)
EOSINOPHIL NFR BLD AUTO: 1 %
ERYTHROCYTE [DISTWIDTH] IN BLOOD BY AUTOMATED COUNT: 15 % (ref 10–15)
ERYTHROCYTE [SEDIMENTATION RATE] IN BLOOD BY WESTERGREN METHOD: 16 MM/HR (ref 0–20)
FASTING STATUS PATIENT QL REPORTED: YES
GFR SERPL CREATININE-BSD FRML MDRD: >90 ML/MIN/1.73M2
GLUCOSE BLD-MCNC: 99 MG/DL (ref 70–99)
HCT VFR BLD AUTO: 39.5 % (ref 35–47)
HGB BLD-MCNC: 12.9 G/DL (ref 11.7–15.7)
LYMPHOCYTES # BLD AUTO: 2.5 10E3/UL (ref 0.8–5.3)
LYMPHOCYTES NFR BLD AUTO: 35 %
MCH RBC QN AUTO: 28.5 PG (ref 26.5–33)
MCHC RBC AUTO-ENTMCNC: 32.7 G/DL (ref 31.5–36.5)
MCV RBC AUTO: 87 FL (ref 78–100)
MONOCYTES # BLD AUTO: 0.5 10E3/UL (ref 0–1.3)
MONOCYTES NFR BLD AUTO: 6 %
NEUTROPHILS # BLD AUTO: 4.2 10E3/UL (ref 1.6–8.3)
NEUTROPHILS NFR BLD AUTO: 57 %
PLATELET # BLD AUTO: 399 10E3/UL (ref 150–450)
PROT SERPL-MCNC: 7.5 G/DL (ref 6.8–8.8)
RBC # BLD AUTO: 4.53 10E6/UL (ref 3.8–5.2)
WBC # BLD AUTO: 7.4 10E3/UL (ref 4–11)

## 2022-05-23 PROCEDURE — 85025 COMPLETE CBC W/AUTO DIFF WBC: CPT

## 2022-05-23 PROCEDURE — 82565 ASSAY OF CREATININE: CPT

## 2022-05-23 PROCEDURE — 36415 COLL VENOUS BLD VENIPUNCTURE: CPT

## 2022-05-23 PROCEDURE — 80076 HEPATIC FUNCTION PANEL: CPT

## 2022-05-23 PROCEDURE — 82947 ASSAY GLUCOSE BLOOD QUANT: CPT

## 2022-05-23 PROCEDURE — 85652 RBC SED RATE AUTOMATED: CPT

## 2022-05-23 PROCEDURE — 86140 C-REACTIVE PROTEIN: CPT

## 2022-06-20 ENCOUNTER — LAB (OUTPATIENT)
Dept: LAB | Facility: CLINIC | Age: 37
End: 2022-06-20
Payer: COMMERCIAL

## 2022-06-20 DIAGNOSIS — Z79.899 HIGH RISK MEDICATION USE: ICD-10-CM

## 2022-06-20 DIAGNOSIS — M06.9 RHEUMATOID ARTHRITIS, INVOLVING UNSPECIFIED SITE, UNSPECIFIED WHETHER RHEUMATOID FACTOR PRESENT (H): ICD-10-CM

## 2022-06-20 LAB
ALBUMIN SERPL-MCNC: 3.4 G/DL (ref 3.4–5)
ALP SERPL-CCNC: 54 U/L (ref 40–150)
ALT SERPL W P-5'-P-CCNC: 16 U/L (ref 0–50)
AST SERPL W P-5'-P-CCNC: 12 U/L (ref 0–45)
BASOPHILS # BLD AUTO: 0.1 10E3/UL (ref 0–0.2)
BASOPHILS NFR BLD AUTO: 1 %
BILIRUB DIRECT SERPL-MCNC: <0.1 MG/DL (ref 0–0.2)
BILIRUB SERPL-MCNC: 0.3 MG/DL (ref 0.2–1.3)
CREAT SERPL-MCNC: 0.78 MG/DL (ref 0.52–1.04)
EOSINOPHIL # BLD AUTO: 0.1 10E3/UL (ref 0–0.7)
EOSINOPHIL NFR BLD AUTO: 1 %
ERYTHROCYTE [DISTWIDTH] IN BLOOD BY AUTOMATED COUNT: 14 % (ref 10–15)
GFR SERPL CREATININE-BSD FRML MDRD: >90 ML/MIN/1.73M2
HCT VFR BLD AUTO: 39.8 % (ref 35–47)
HGB BLD-MCNC: 13 G/DL (ref 11.7–15.7)
LYMPHOCYTES # BLD AUTO: 2.5 10E3/UL (ref 0.8–5.3)
LYMPHOCYTES NFR BLD AUTO: 27 %
MCH RBC QN AUTO: 28.4 PG (ref 26.5–33)
MCHC RBC AUTO-ENTMCNC: 32.7 G/DL (ref 31.5–36.5)
MCV RBC AUTO: 87 FL (ref 78–100)
MONOCYTES # BLD AUTO: 0.6 10E3/UL (ref 0–1.3)
MONOCYTES NFR BLD AUTO: 6 %
NEUTROPHILS # BLD AUTO: 6.2 10E3/UL (ref 1.6–8.3)
NEUTROPHILS NFR BLD AUTO: 66 %
PLATELET # BLD AUTO: 431 10E3/UL (ref 150–450)
PROT SERPL-MCNC: 7.3 G/DL (ref 6.8–8.8)
RBC # BLD AUTO: 4.58 10E6/UL (ref 3.8–5.2)
WBC # BLD AUTO: 9.4 10E3/UL (ref 4–11)

## 2022-06-20 PROCEDURE — 82565 ASSAY OF CREATININE: CPT

## 2022-06-20 PROCEDURE — 85025 COMPLETE CBC W/AUTO DIFF WBC: CPT

## 2022-06-20 PROCEDURE — 80076 HEPATIC FUNCTION PANEL: CPT

## 2022-06-20 PROCEDURE — 36415 COLL VENOUS BLD VENIPUNCTURE: CPT

## 2022-06-21 ENCOUNTER — TRANSFERRED RECORDS (OUTPATIENT)
Dept: MULTI SPECIALTY CLINIC | Facility: CLINIC | Age: 37
End: 2022-06-21

## 2022-06-21 LAB
HPV ABSTRACT: NORMAL
PAP-ABSTRACT: NORMAL

## 2022-06-29 ENCOUNTER — OFFICE VISIT (OUTPATIENT)
Dept: FAMILY MEDICINE | Facility: CLINIC | Age: 37
End: 2022-06-29
Payer: COMMERCIAL

## 2022-06-29 VITALS
DIASTOLIC BLOOD PRESSURE: 74 MMHG | HEART RATE: 73 BPM | SYSTOLIC BLOOD PRESSURE: 119 MMHG | BODY MASS INDEX: 22.43 KG/M2 | TEMPERATURE: 97.3 F | HEIGHT: 68 IN | WEIGHT: 148 LBS | OXYGEN SATURATION: 99 %

## 2022-06-29 DIAGNOSIS — F32.0 MILD MAJOR DEPRESSION (H): ICD-10-CM

## 2022-06-29 DIAGNOSIS — E03.8 OTHER SPECIFIED HYPOTHYROIDISM: Primary | ICD-10-CM

## 2022-06-29 LAB — TSH SERPL DL<=0.005 MIU/L-ACNC: 3.25 MU/L (ref 0.4–4)

## 2022-06-29 PROCEDURE — 84443 ASSAY THYROID STIM HORMONE: CPT | Performed by: PHYSICIAN ASSISTANT

## 2022-06-29 PROCEDURE — 36415 COLL VENOUS BLD VENIPUNCTURE: CPT | Performed by: PHYSICIAN ASSISTANT

## 2022-06-29 PROCEDURE — 99214 OFFICE O/P EST MOD 30 MIN: CPT | Performed by: PHYSICIAN ASSISTANT

## 2022-06-29 ASSESSMENT — ANXIETY QUESTIONNAIRES
7. FEELING AFRAID AS IF SOMETHING AWFUL MIGHT HAPPEN: SEVERAL DAYS
8. IF YOU CHECKED OFF ANY PROBLEMS, HOW DIFFICULT HAVE THESE MADE IT FOR YOU TO DO YOUR WORK, TAKE CARE OF THINGS AT HOME, OR GET ALONG WITH OTHER PEOPLE?: NOT DIFFICULT AT ALL
GAD7 TOTAL SCORE: 7
7. FEELING AFRAID AS IF SOMETHING AWFUL MIGHT HAPPEN: SEVERAL DAYS
4. TROUBLE RELAXING: MORE THAN HALF THE DAYS
GAD7 TOTAL SCORE: 7
3. WORRYING TOO MUCH ABOUT DIFFERENT THINGS: SEVERAL DAYS
2. NOT BEING ABLE TO STOP OR CONTROL WORRYING: SEVERAL DAYS
5. BEING SO RESTLESS THAT IT IS HARD TO SIT STILL: NOT AT ALL
1. FEELING NERVOUS, ANXIOUS, OR ON EDGE: SEVERAL DAYS
GAD7 TOTAL SCORE: 7
6. BECOMING EASILY ANNOYED OR IRRITABLE: SEVERAL DAYS

## 2022-06-29 ASSESSMENT — PATIENT HEALTH QUESTIONNAIRE - PHQ9
SUM OF ALL RESPONSES TO PHQ QUESTIONS 1-9: 15
SUM OF ALL RESPONSES TO PHQ QUESTIONS 1-9: 15
10. IF YOU CHECKED OFF ANY PROBLEMS, HOW DIFFICULT HAVE THESE PROBLEMS MADE IT FOR YOU TO DO YOUR WORK, TAKE CARE OF THINGS AT HOME, OR GET ALONG WITH OTHER PEOPLE: VERY DIFFICULT

## 2022-06-29 ASSESSMENT — PAIN SCALES - GENERAL: PAINLEVEL: NO PAIN (0)

## 2022-06-29 NOTE — PROGRESS NOTES
Assessment & Plan     Other specified hypothyroidism  Check her thyroid hormones today.   She is current taking 137 mcg levothyroxine  Adjust or refill at the current dose depending on results.   She will watch [employer] for results and messages.   - TSH WITH FREE T4 REFLEX; Future  - TSH WITH FREE T4 REFLEX    Mild major depression (H)  Stop the citalopram.   Start sertraline. Side effects and how to take the medication discussed.  Recheck in 6 months, sooner if needed.   - sertraline (ZOLOFT) 50 MG tablet; Take 1 tablet (50 mg) by mouth daily                 Return in about 6 months (around 12/29/2022) for medication check.    Kristen M. Kehr, PA-C  [facility]   Lillie is a 36 year old, presenting for the following health issues:  Depression, Anxiety, and thyroid    She is having side effects with the citalopram. Night sweats are difficult.   She would like to change to a different medication.  She is also due for thyroid testing and refill of medications.       History of Present Illness       Mental Health Follow-up:  Patient presents to follow-up on Depression & Anxiety.Patient's depression since last visit has been:  Worse  The patient is having other symptoms associated with depression.  Patient's anxiety since last visit has been:  Medium  The patient is having other symptoms associated with anxiety.  Any significant life events: No  Patient is not feeling anxious or having panic attacks.  Patient has no concerns about alcohol or drug use.    Reason for visit:  Depression and thyroid    She eats 2-3 servings of fruits and vegetables daily.She consumes 3 sweetened beverage(s) daily.She exercises with enough effort to increase her heart rate 9 or less minutes per day.  She exercises with enough effort to increase her heart rate 3 or less days per week.   She is taking medications regularly.    Today's PHQ-9         PHQ-9 Total Score: 15    PHQ-9 Q9 Thoughts of better off  "dead/self-harm past 2 weeks :   Not at all    How difficult have these problems made it for you to do your work, take care of things at home, or get along with other people: Very difficult  Today's MU-7 Score: 7             Review of Systems   Constitutional, HEENT, cardiovascular, pulmonary, GI, , musculoskeletal, neuro, skin, endocrine and psych systems are negative, except as otherwise noted.      Objective    /74   Pulse 73   Temp 97.3  F (36.3  C) (Tympanic)   Ht 1.715 m (5' 7.5\")   Wt 67.1 kg (148 lb)   SpO2 99%   BMI 22.84 kg/m    Body mass index is 22.84 kg/m .  Physical Exam   GENERAL: healthy, alert and no distress  PSYCH: mentation appears normal, affect normal/bright                    .  ..  "

## 2022-06-29 NOTE — NURSING NOTE
"Chief Complaint   Patient presents with     Depression     Anxiety     thyroid       Initial /74   Pulse 73   Temp 97.3  F (36.3  C) (Tympanic)   Ht 1.715 m (5' 7.5\")   Wt 67.1 kg (148 lb)   SpO2 99%   BMI 22.84 kg/m   Estimated body mass index is 22.84 kg/m  as calculated from the following:    Height as of this encounter: 1.715 m (5' 7.5\").    Weight as of this encounter: 67.1 kg (148 lb).  Medication Reconciliation: complete    SELENE Martines MA    "

## 2022-06-30 DIAGNOSIS — E03.8 OTHER SPECIFIED HYPOTHYROIDISM: ICD-10-CM

## 2022-06-30 RX ORDER — LEVOTHYROXINE SODIUM 137 UG/1
150 TABLET ORAL DAILY
Qty: 90 TABLET | Refills: 3 | Status: SHIPPED | OUTPATIENT
Start: 2022-06-30 | End: 2024-01-10

## 2022-08-22 ENCOUNTER — TELEPHONE (OUTPATIENT)
Dept: FAMILY MEDICINE | Facility: CLINIC | Age: 37
End: 2022-08-22

## 2022-08-22 DIAGNOSIS — Z30.9 CONTRACEPTIVE MANAGEMENT: Primary | ICD-10-CM

## 2022-08-22 DIAGNOSIS — Z30.09 CONSULTATION FOR STERILIZATION: ICD-10-CM

## 2022-08-22 NOTE — TELEPHONE ENCOUNTER
Kimberley from [facility] calling.   Patient had a gyn consult 7/15/22 at [facility] and patient will now be having tubal sterilization 9/122/22 with [facility]. See Gyn consult for this in Care Everywhere.     Patient needs a gyn referral for [facility] for this to be approved.   RN pended referral.   If referral is completed, please call Kimberley back at above number to let her know.     Consuelo LEMAN, RN

## 2022-08-23 NOTE — TELEPHONE ENCOUNTER
I have called and left a message with Kimberley informing her that the OBGYN referral has been place.  Ai PEREZ - Janice

## 2022-08-24 NOTE — TELEPHONE ENCOUNTER
Out of network, sent to medical officer to review as we need approval first to send to insurance.  I called Kimberley at [facility] to let her know of this and that it will most likely be denied as the services are available in her network.  Informed we will call once response is received.

## 2022-08-26 NOTE — TELEPHONE ENCOUNTER
Response rec'd from review and out of network referral was declined. Declined Reason:  Service is available in care system. I called Kimberley and informed her of this and she will let the patient know.

## 2022-09-08 ENCOUNTER — OFFICE VISIT (OUTPATIENT)
Dept: FAMILY MEDICINE | Facility: CLINIC | Age: 37
End: 2022-09-08
Payer: COMMERCIAL

## 2022-09-08 VITALS
HEART RATE: 82 BPM | TEMPERATURE: 97.6 F | SYSTOLIC BLOOD PRESSURE: 109 MMHG | WEIGHT: 146 LBS | BODY MASS INDEX: 22.13 KG/M2 | OXYGEN SATURATION: 99 % | HEIGHT: 68 IN | DIASTOLIC BLOOD PRESSURE: 72 MMHG

## 2022-09-08 DIAGNOSIS — Z30.2 ENCOUNTER FOR STERILIZATION: ICD-10-CM

## 2022-09-08 DIAGNOSIS — Z01.818 PREOP GENERAL PHYSICAL EXAM: Primary | ICD-10-CM

## 2022-09-08 PROCEDURE — 99214 OFFICE O/P EST MOD 30 MIN: CPT | Performed by: FAMILY MEDICINE

## 2022-09-08 ASSESSMENT — PATIENT HEALTH QUESTIONNAIRE - PHQ9
10. IF YOU CHECKED OFF ANY PROBLEMS, HOW DIFFICULT HAVE THESE PROBLEMS MADE IT FOR YOU TO DO YOUR WORK, TAKE CARE OF THINGS AT HOME, OR GET ALONG WITH OTHER PEOPLE: VERY DIFFICULT
SUM OF ALL RESPONSES TO PHQ QUESTIONS 1-9: 8
SUM OF ALL RESPONSES TO PHQ QUESTIONS 1-9: 8

## 2022-09-08 ASSESSMENT — PAIN SCALES - GENERAL: PAINLEVEL: NO PAIN (0)

## 2022-09-08 NOTE — Clinical Note
Please abstract the following data from this visit with this patient into the appropriate field in Epic:  Other Tests found in the patient's chart through Chart Review/Care Everywhere:  Pap smear done by this group [facility] this date: 6-21-22  Note to [employer]: If this section is blank, no results were found via Chart Review/Care Everywhere.

## 2022-09-08 NOTE — PROGRESS NOTES
[facility]  [address] [facility] [address]  Phone: [phone]  Primary Provider: Kehr, Kristen M  Pre-op Performing Provider: ALEXANDR GOMEZ      PREOPERATIVE EVALUATION:  Today's date: 9/8/2022    Lillie Sánchez is a 37 year old female who presents for a preoperative evaluation.    Surgical Information:  Surgery/Procedure: Laparoscopic Bilateral salpingectomy  Surgery Location: [facility]  Surgeon: Kayley Avelar  Surgery Date: 9/12/22  Time of Surgery: 130pm    Where patient plans to recover: At home with family  Fax number for surgical facility: [phone]    Type of Anesthesia Anticipated: General    Assessment & Plan     The proposed surgical procedure is considered INTERMEDIATE risk.    Preop general physical exam  There is no contraindication for the procedure     Encounter for sterilization          Risks and Recommendations:  The patient has the following additional risks and recommendations for perioperative complications:   - No identified additional risk factors other than previously addressed    Medication Instructions:   - DMARDs patient will reach out to her rheumatologist for further recommendation on her medication   - Hydroxychloroquine 300 mg daily - continue without change   - Sulfasalazine 1000 mg BID-  continue without change     - Methotrexate 20 mg Wednesdays -  continue without change      - SSRIs, SNRIs, TCAs, Antipsychotics: Continue without modification.    - Intraoperative stress dose steroids may be indicated due to chronic steroid use in the last 3 months (e.g. > 3 weeks of prednisone 20 mg or daily prednisone 5 mg).    RECOMMENDATION:  APPROVAL GIVEN to proceed with proposed procedure, without further diagnostic evaluation.      Answers for HPI/ROS submitted by the patient on 9/8/2022  If you checked off any problems, how difficult have these problems made it for you to do your work, take care of things at home, or get along with  other people?: Very difficult  PHQ9 TOTAL SCORE: 8      Subjective     HPI related to upcoming procedure:   Lillie Sánchez is a 37 year old female who presents today for preop for upcoming procedure   She is going for Laparoscopic Bilateral salpingectomy for permanent contraception   She is doing well   No current concern   She is active and is doing well     Preop Questions 9/8/2022   1. Have you ever had a heart attack or stroke? No   2. Have you ever had surgery on your heart or blood vessels, such as a stent placement, a coronary artery bypass, or surgery on an artery in your head, neck, heart, or legs? No   3. Do you have chest pain with activity? No   4. Do you have a history of  heart failure? No   5. Do you currently have a cold, bronchitis or symptoms of other infection? No   6. Do you have a cough, shortness of breath, or wheezing? No   7. Do you or anyone in your family have previous history of blood clots? No   8. Do you or does anyone in your family have a serious bleeding problem such as prolonged bleeding following surgeries or cuts? No   9. Have you ever had problems with anemia or been told to take iron pills? No   10. Have you had any abnormal blood loss such as black, tarry or bloody stools, or abnormal vaginal bleeding? No   11. Have you ever had a blood transfusion? No   12. Are you willing to have a blood transfusion if it is medically needed before, during, or after your surgery? Yes   13. Have you or any of your relatives ever had problems with anesthesia? No   14. Do you have sleep apnea, excessive snoring or daytime drowsiness? UNKNOWN - no previous diagnosis of STERLING    15. Do you have any artifical heart valves or other implanted medical devices like a pacemaker, defibrillator, or continuous glucose monitor? No   16. Do you have artificial joints? No   17. Are you allergic to latex? No   18. Is there any chance that you may be pregnant? No       Health Care Directive:  Patient does  not have a Health Care Directive or Living Will: Discussed advance care planning with patient; however, patient declined at this time.    Preoperative Review of :   reviewed - no record of controlled substances prescribed.      Status of Chronic Conditions:  See problem list for active medical problems.  Problems all longstanding and stable, except as noted/documented.  See ROS for pertinent symptoms related to these conditions.      RA:  symptoms of rheumatoid arthritis started in 2019  Follows with rheumatology Dr Jc   - Prednisone 4 mg daily   - Hydroxychloroquine 300 mg daily   - Sulfasalazine 1000 mg BID  - Methotrexate 20 mg Wednesdays     DEPRESSION - Patient has a long history of Depression of moderate severity requiring medication for control with recent symptoms being stable..Current symptoms of depression include none.     HYPOTHYROIDISM - Patient has a longstanding history of chronic Hypothyroidism. Patient has been doing well, noting no tremor, insomnia, hair loss or changes in skin texture. Continues to take medications as directed, without adverse reactions or side effects. Last TSH   Lab Results   Component Value Date    TSH 3.25 06/29/2022   .        Review of Systems  CONSTITUTIONAL: NEGATIVE for fever, chills, change in weight  INTEGUMENTARY/SKIN: NEGATIVE for worrisome rashes, moles or lesions  EYES: NEGATIVE for vision changes or irritation  ENT/MOUTH: NEGATIVE for ear, mouth and throat problems  RESP: NEGATIVE for significant cough or SOB  CV: NEGATIVE for chest pain, palpitations or peripheral edema  GI: NEGATIVE for nausea, abdominal pain, heartburn, or change in bowel habits  : NEGATIVE for frequency, dysuria, or hematuria  MUSCULOSKELETAL: NEGATIVE for significant arthralgias or myalgia  NEURO: NEGATIVE for weakness, dizziness or paresthesias  ENDOCRINE: NEGATIVE for temperature intolerance, skin/hair changes  HEME: NEGATIVE for bleeding problems  PSYCHIATRIC: NEGATIVE for  changes in mood or affect    Patient Active Problem List    Diagnosis Date Noted     Rheumatoid arthritis (H) 02/23/2021     Priority: Medium     Mild major depression (H) 12/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  Stefanie Reid RN          CARDIOVASCULAR SCREENING; LDL GOAL LESS THAN 160 10/31/2010     Priority: Medium     Hypothyroidism 07/13/2009     Priority: Medium      Past Medical History:   Diagnosis Date     NO ACTIVE PROBLEMS      Rheumatoid arthritis (H) 2/23/2021     Thyroid disease      Past Surgical History:   Procedure Laterality Date     NO HISTORY OF SURGERY       Current Outpatient Medications   Medication Sig Dispense Refill     cholecalciferol (VITAMIN D3) 25 mcg (1000 units) capsule Take 1,000 Units by mouth       folic acid (FOLVITE) 1 MG tablet Take 1 tablet (1 mg) by mouth daily 100 tablet 3     hydroxychloroquine (PLAQUENIL) 200 MG tablet Hydroxychloroquine 300 mg (1.5 tablets) daily 45 tablet 3     ketoconazole (NIZORAL) 2 % external shampoo Apply daily to skin in the shower / leave for 5 minutes / then wash off 120 mL 11     levothyroxine (SYNTHROID/LEVOTHROID) 137 MCG tablet Take 1 tablet (137 mcg) by mouth daily 90 tablet 3     methotrexate sodium 2.5 MG TABS Take 8 tablets (20 mg) by mouth once a week . Take all 8 tablets on the same day of each week. 32 tablet 3     methylPREDNISolone (MEDROL) 4 MG tablet Take 1 tablet (4 mg) by mouth daily 30 tablet 3     norgestrel-ethinyl estradiol (LO/OVRAL) 0.3-30 MG-MCG tablet Take 1 tablet by mouth daily       sertraline (ZOLOFT) 50 MG tablet Take 1 tablet (50 mg) by mouth daily 90 tablet 1      "sulfaSALAzine (AZULFIDINE) 500 MG tablet Sulfasalazine 500 mg twice daily x7days, then 1000 mg twice daily thereafter 120 tablet 3     VITAMIN D PO          Allergies   Allergen Reactions     Vicodin [Hydrocodone-Acetaminophen]      nausea        Social History     Tobacco Use     Smoking status: Former Smoker     Packs/day: 0.50     Years: 10.00     Pack years: 5.00     Types: Cigarettes     Start date: 2005     Quit date: 2015     Years since quittin.0     Smokeless tobacco: Never Used   Substance Use Topics     Alcohol use: Yes     Alcohol/week: 0.0 standard drinks     Family History   Problem Relation Age of Onset     Breast Cancer Other 50        2 aunts     Mental Illness Other      Depression Sister      Depression Mother      Thyroid Disease Mother         multiple maternal relatives with hypothyroidism     Depression Maternal Grandmother      Thyroid Disease Maternal Grandmother      History   Drug Use No         Objective     /72 (BP Location: Left arm, Patient Position: Sitting, Cuff Size: Adult Regular)   Pulse 82   Temp 97.6  F (36.4  C) (Tympanic)   Ht 1.715 m (5' 7.5\")   Wt 66.2 kg (146 lb)   SpO2 99%   BMI 22.53 kg/m      Physical Exam    GENERAL APPEARANCE: healthy, alert and no distress     EYES: EOMI, PERRL     HENT: ear canals and TM's normal and nose and mouth without ulcers or lesions     NECK: no adenopathy, no asymmetry, masses, or scars and thyroid normal to palpation     RESP: lungs clear to auscultation - no rales, rhonchi or wheezes     CV: regular rates and rhythm, normal S1 S2, no S3 or S4 and no murmur, click or rub     ABDOMEN:  soft, nontender, no HSM or masses and bowel sounds normal     MS: extremities normal- no gross deformities noted, no evidence of inflammation in joints, FROM in all extremities.     SKIN: no suspicious lesions or rashes     NEURO: Normal strength and tone, sensory exam grossly normal, mentation intact and speech normal     PSYCH: " mentation appears normal. and affect normal/bright     LYMPHATICS: No cervical adenopathy    Recent Labs   Lab Test 06/20/22  1107 05/23/22  1131   HGB 13.0 12.9    399   CR 0.78 0.83        Diagnostics:  No labs were ordered during this visit.   No EKG required, no history of coronary heart disease, significant arrhythmia, peripheral arterial disease or other structural heart disease.    Revised Cardiac Risk Index (RCRI):  The patient has the following serious cardiovascular risks for perioperative complications:   - No serious cardiac risks = 0 points     RCRI Interpretation: 0 points: Class I (very low risk - 0.4% complication rate)           Signed Electronically by: Beau Mcallister MD  Copy of this evaluation report is provided to requesting physician.

## 2022-09-08 NOTE — PATIENT INSTRUCTIONS
Preparing for Your Surgery  Getting started  A nurse will call you to review your health history and instructions. They will give you an arrival time based on your scheduled surgery time. Please be ready to share:    Your doctor's clinic name and phone number    Your medical, surgical and anesthesia history    A list of allergies and sensitivities    A list of medicines, including herbal treatments and over-the-counter drugs    Whether the patient has a legal guardian (ask how to send us the papers in advance)  Please tell us if you're pregnant--or if there's any chance you might be pregnant. Some surgeries may injure a fetus (unborn baby), so they require a pregnancy test. Surgeries that are safe for a fetus don't always need a test, and you can choose whether to have one.   If you have a child who's having surgery, please ask for a copy of Preparing for Your Child's Surgery.    Preparing for surgery    Within 30 days of surgery: Have a pre-op exam (sometimes called an H&P, or History and Physical). This can be done at a clinic or pre-operative center.  ? If you're having a , you may not need this exam. Talk to your care team.    At your pre-op exam, talk to your care team about all medicines you take. If you need to stop any medicines before surgery, ask when to start taking them again.  ? We do this for your safety. Many medicines can make you bleed too much during surgery. Some change how well surgery (anesthesia) drugs work.    Call your insurance company to let them know you're having surgery. (If you don't have insurance, call [phone].)    Call your clinic if there's any change in your health. This includes signs of a cold or flu (sore throat, runny nose, cough, rash, fever). It also includes a scrape or scratch near the surgery site.    If you have questions on the day of surgery, call your hospital or surgery center.  COVID testing  You may need to be tested for COVID-19 before having  surgery. If so, we will give you instructions.  Eating and drinking guidelines  For your safety: Unless your surgeon tells you otherwise, follow the guidelines below.    Eat and drink as usual until 8 hours before surgery. After that, no food or milk.    Drink clear liquids until 2 hours before surgery. These are liquids you can see through, like water, Gatorade and Propel Water. You may also have black coffee and tea (no cream or milk).    Nothing by mouth within 2 hours of surgery. This includes gum, candy and breath mints.    If you drink alcohol: Stop drinking it the night before surgery.    If your care team tells you to take medicine on the morning of surgery, it's okay to take it with a sip of water.  Preventing infection    Shower or bathe the night before and morning of your surgery. Follow the instructions your clinic gave you. (If no instructions, use regular soap.)    Don't shave or clip hair near your surgery site. We'll remove the hair if needed.    Don't smoke or vape the morning of surgery. You may chew nicotine gum up to 2 hours before surgery. A nicotine patch is okay.  ? Note: Some surgeries require you to completely quit smoking and nicotine. Check with your surgeon.    Your care team will make every effort to keep you safe from infection. We will:  ? Clean our hands often with soap and water (or an alcohol-based hand rub).  ? Clean the skin at your surgery site with a special soap that kills germs.  ? Give you a special gown to keep you warm. (Cold raises the risk of infection.)  ? Wear special hair covers, masks, gowns and gloves during surgery.  ? Give antibiotic medicine, if prescribed. Not all surgeries need antibiotics.  What to bring on the day of surgery    Photo ID and insurance card    Copy of your health care directive, if you have one    Glasses and hearing aides (bring cases)  ? You can't wear contacts during surgery    Inhaler and eye drops, if you use them (tell us about these when  you arrive)    CPAP machine or breathing device, if you use them    A few personal items, if spending the night    If you have . . .  ? A pacemaker, ICD (cardiac defibrillator) or other implant: Bring the ID card.  ? An implanted stimulator: Bring the remote control.  ? A legal guardian: Bring a copy of the certified (court-stamped) guardianship papers.  Please remove any jewelry, including body piercings. Leave jewelry and other valuables at home.  If you're going home the day of surgery    You must have a responsible adult drive you home. They should stay with you overnight as well.    If you don't have someone to stay with you, and you aren't safe to go home alone, we may keep you overnight. Insurance often won't pay for this.  After surgery  If it's hard to control your pain or you need more pain medicine, please call your surgeon's office.  Questions?   If you have any questions for your care team, list them here: _________________________________________________________________________________________________________________________________________________________________________ ____________________________________ ____________________________________ ____________________________________  For informational purposes only. Not to replace the advice of your health care provider. Copyright   2003, 2019 [facility]. All rights reserved. Clinically reviewed by Karine Perez MD. Schoology [id number] - REV 07/21.

## 2022-10-13 ENCOUNTER — ANCILLARY PROCEDURE (OUTPATIENT)
Dept: GENERAL RADIOLOGY | Facility: CLINIC | Age: 37
End: 2022-10-13
Attending: FAMILY MEDICINE
Payer: COMMERCIAL

## 2022-10-13 ENCOUNTER — OFFICE VISIT (OUTPATIENT)
Dept: ORTHOPEDICS | Facility: CLINIC | Age: 37
End: 2022-10-13
Payer: COMMERCIAL

## 2022-10-13 VITALS — BODY MASS INDEX: 22.53 KG/M2 | DIASTOLIC BLOOD PRESSURE: 70 MMHG | HEIGHT: 68 IN | SYSTOLIC BLOOD PRESSURE: 112 MMHG

## 2022-10-13 DIAGNOSIS — M19.032 ARTHRITIS OF LEFT WRIST: Primary | ICD-10-CM

## 2022-10-13 DIAGNOSIS — M25.532 LEFT WRIST PAIN: ICD-10-CM

## 2022-10-13 PROCEDURE — 99203 OFFICE O/P NEW LOW 30 MIN: CPT | Mod: 25 | Performed by: FAMILY MEDICINE

## 2022-10-13 PROCEDURE — 20606 DRAIN/INJ JOINT/BURSA W/US: CPT | Mod: LT | Performed by: FAMILY MEDICINE

## 2022-10-13 PROCEDURE — 73110 X-RAY EXAM OF WRIST: CPT | Mod: TC | Performed by: RADIOLOGY

## 2022-10-13 RX ADMIN — BETAMETHASONE SODIUM PHOSPHATE AND BETAMETHASONE ACETATE 6 MG: 3; 3 INJECTION, SUSPENSION INTRA-ARTICULAR; INTRALESIONAL; INTRAMUSCULAR; SOFT TISSUE at 17:02

## 2022-10-13 RX ADMIN — ROPIVACAINE HYDROCHLORIDE 2 ML: 5 INJECTION, SOLUTION EPIDURAL; INFILTRATION; PERINEURAL at 17:02

## 2022-10-13 NOTE — LETTER
10/13/2022         RE: Lillie Sánchez  [address] [facility] [address]        Dear Colleague,    Thank you for referring your patient, Lillie Sánchez, to the [facility]. Please see a copy of my visit note below.    ASSESSMENT & PLAN    Lillie was seen today for pain.    Diagnoses and all orders for this visit:    Arthritis of left wrist  -     Medium Joint Injection/Arthrocentesis: L radiocarpal    Left wrist pain  -     XR Wrist Left G/E 3 Views; Future  -     Medium Joint Injection/Arthrocentesis: L radiocarpal    Other orders  -     Cancel: Large Joint Injection/Arthocentesis      This issue is acute on chronic and Worsening.    # Acute on Chronic Left Wrist Pain: Long-standing condition for patient over the past several years in the setting of having rheumatoid arthritis. She did have an acute injury after a fall one month ago with worsening pain. She does have pain over the radial ulnar side of her wrist with limited range of motion secondary to tightness and pain. X-rays today showing severe radial carpal arthritis and widening of the scapholunate interval. Likely cause of her pain due to flare of arthritis. Given this plan to treat as below and follow-up as needed.  Image Findings: arthritis of left wrist  Treatment: Activities as tolerated, home exercises given today, can use wrist brace as needed  Job: no restrictions   Medications/Injections: Limited tylenol/ibuprofen for pain for 1-2 weeks, left radial carpal/ulnar carpal steroid injection  Follow-up: In one month if symptoms do not improve, sooner if worsening  Can consider hand therapy, left wrist MRI        Arash Slaughter MD  [facility]    -----  Chief Complaint   Patient presents with     Left Wrist - Pain       SUBJECTIVE  Lillie Sánchez is a/an 37 year old female who is seen as a self referral for evaluation of left wrist pain.     The  "patient is seen by themselves.  The patient is Right handed    Onset: 3 years(s) ago. Reports insidious onset without acute precipitating event. Fell over 1 month ago FOOSH injury and pain increased.   Location of Pain: left ulnar side of wrist, pain radiating up medial arm to help and axilla    Worsened by: any movement  Better with: wrist brace   Treatments tried: ice, wrist brace, prednisone   Associated symptoms: swelling and numbness    Orthopedic/Surgical history: YES - Previously saw Dr. Maloney 2016 for left wrist pain, Rheumatoid arthritis   Social History/Occupation: Works from home-[place] [employer]     No family history pertinent to patient's problem today.      REVIEW OF SYSTEMS:  Review of Systems  Constitutional, HEENT, cardiovascular, pulmonary, GI, , musculoskeletal, neuro, skin, endocrine and psych systems are negative, except as otherwise noted.    OBJECTIVE:  /70   Ht 1.715 m (5' 7.5\")   BMI 22.53 kg/m     General: healthy, alert and in no distress  HEENT: no scleral icterus or conjunctival erythema  Skin: no suspicious lesions or rash. No jaundice.  CV: distal perfusion intact    Resp: normal respiratory effort without conversational dyspnea   Psych: normal mood and affect  Gait: normal steady gait with appropriate coordination and balance    Neuro: Normal light sensory exam of left upper extremity     Hand/wrist (left):  No deformity noted.  No edema, ecchymosis.  Limited flexion/extension 2/2 tightness/pain  Full  and intrinsic strength.  Radial pulses normal, +2/4, capillary refill brisk.  TTP over radiocarpal, ulnar sided wrist joint    Tinel, Phalen signs negative.  Finkelstein negative.  Positive cubital tunnel Tinels  Intact and symmetric strength and sensation in the median/radial/unlar distributions bilaterally        RADIOLOGY:  I independently ordered, visualized and reviewed these images with the patient    Severe left wrist radiocarpal arthritis, widened " scapholunate joint, abutment of distal ulna.      Review of external notes as documented elsewhere in note  Review of the result(s) of each unique test - left wrist x-rays     Disclaimer: This note consists of symbols derived from keyboarding, dictation and/or voice recognition software. As a result, there may be errors in the script that have gone undetected. Please consider this when interpreting information found in this chart.      Medium Joint Injection/Arthrocentesis: L radiocarpal    Date/Time: 10/13/2022 5:02 PM  Performed by: Arash Slaughter MD  Authorized by: Arash Slaughter MD     Indications:  Pain  Needle Size:  25 G  Guidance: ultrasound    Approach:  Dorsal  Location:  Wrist  Site:  L radiocarpal  Medications:  6 mg betamethasone acet & sod phos 6 (3-3) MG/ML; 2 mL ropivacaine 5 MG/ML  Outcome:  Tolerated well, no immediate complications  Procedure discussed: discussed risks, benefits, and alternatives    Consent Given by:  Patient  Timeout: timeout called immediately prior to procedure    Prep: patient was prepped and draped in usual sterile fashion     Ultrasound images of procedure were permanently stored.     Patient reported no significant improvement of pain after the numbing portion left radiocarpal steroid injection.  Ultrasound guided images were permanently stored.   Aftercare instructions given to patient.  Plan to follow-up as discussed above.     Arash Slaughter MD [facility]              Again, thank you for allowing me to participate in the care of your patient.        Sincerely,        Arash Slaughter MD

## 2022-10-13 NOTE — PROGRESS NOTES
ASSESSMENT & PLAN    Lillie was seen today for pain.    Diagnoses and all orders for this visit:    Arthritis of left wrist  -     Medium Joint Injection/Arthrocentesis: L radiocarpal    Left wrist pain  -     XR Wrist Left G/E 3 Views; Future  -     Medium Joint Injection/Arthrocentesis: L radiocarpal    Other orders  -     Cancel: Large Joint Injection/Arthocentesis      This issue is acute on chronic and Worsening.    # Acute on Chronic Left Wrist Pain: Long-standing condition for patient over the past several years in the setting of having rheumatoid arthritis. She did have an acute injury after a fall one month ago with worsening pain. She does have pain over the radial ulnar side of her wrist with limited range of motion secondary to tightness and pain. X-rays today showing severe radial carpal arthritis and widening of the scapholunate interval. Likely cause of her pain due to flare of arthritis. Given this plan to treat as below and follow-up as needed.  Image Findings: arthritis of left wrist  Treatment: Activities as tolerated, home exercises given today, can use wrist brace as needed  Job: no restrictions   Medications/Injections: Limited tylenol/ibuprofen for pain for 1-2 weeks, left radial carpal/ulnar carpal steroid injection  Follow-up: In one month if symptoms do not improve, sooner if worsening  Can consider hand therapy, left wrist MRI        Arash Slaughter MD  [facility]    -----  Chief Complaint   Patient presents with     Left Wrist - Pain       SUBJECTIVE  Lillie Sánchez is a/an 37 year old female who is seen as a self referral for evaluation of left wrist pain.     The patient is seen by themselves.  The patient is Right handed    Onset: 3 years(s) ago. Reports insidious onset without acute precipitating event. Fell over 1 month ago FOOSH injury and pain increased.   Location of Pain: left ulnar side of wrist, pain radiating up medial arm to  "help and axilla    Worsened by: any movement  Better with: wrist brace   Treatments tried: ice, wrist brace, prednisone   Associated symptoms: swelling and numbness    Orthopedic/Surgical history: YES - Previously saw Dr. Maloney 2016 for left wrist pain, Rheumatoid arthritis   Social History/Occupation: Works from home-[place] [employer]     No family history pertinent to patient's problem today.      REVIEW OF SYSTEMS:  Review of Systems  Constitutional, HEENT, cardiovascular, pulmonary, GI, , musculoskeletal, neuro, skin, endocrine and psych systems are negative, except as otherwise noted.    OBJECTIVE:  /70   Ht 1.715 m (5' 7.5\")   BMI 22.53 kg/m     General: healthy, alert and in no distress  HEENT: no scleral icterus or conjunctival erythema  Skin: no suspicious lesions or rash. No jaundice.  CV: distal perfusion intact    Resp: normal respiratory effort without conversational dyspnea   Psych: normal mood and affect  Gait: normal steady gait with appropriate coordination and balance    Neuro: Normal light sensory exam of left upper extremity     Hand/wrist (left):  No deformity noted.  No edema, ecchymosis.  Limited flexion/extension 2/2 tightness/pain  Full  and intrinsic strength.  Radial pulses normal, +2/4, capillary refill brisk.  TTP over radiocarpal, ulnar sided wrist joint    Tinel, Phalen signs negative.  Finkelstein negative.  Positive cubital tunnel Tinels  Intact and symmetric strength and sensation in the median/radial/unlar distributions bilaterally        RADIOLOGY:  I independently ordered, visualized and reviewed these images with the patient    Severe left wrist radiocarpal arthritis, widened scapholunate joint, abutment of distal ulna.      Review of external notes as documented elsewhere in note  Review of the result(s) of each unique test - left wrist x-rays     Disclaimer: This note consists of symbols derived from keyboarding, dictation and/or voice recognition software. " As a result, there may be errors in the script that have gone undetected. Please consider this when interpreting information found in this chart.      Medium Joint Injection/Arthrocentesis: L radiocarpal    Date/Time: 10/13/2022 5:02 PM  Performed by: Arash Slaughter MD  Authorized by: Arash Slaughter MD     Indications:  Pain  Needle Size:  25 G  Guidance: ultrasound    Approach:  Dorsal  Location:  Wrist  Site:  L radiocarpal  Medications:  6 mg betamethasone acet & sod phos 6 (3-3) MG/ML; 2 mL ropivacaine 5 MG/ML  Outcome:  Tolerated well, no immediate complications  Procedure discussed: discussed risks, benefits, and alternatives    Consent Given by:  Patient  Timeout: timeout called immediately prior to procedure    Prep: patient was prepped and draped in usual sterile fashion     Ultrasound images of procedure were permanently stored.     Patient reported no significant improvement of pain after the numbing portion left radiocarpal steroid injection.  Ultrasound guided images were permanently stored.   Aftercare instructions given to patient.  Plan to follow-up as discussed above.     Arash Slaughter MD [facility]

## 2022-10-13 NOTE — PATIENT INSTRUCTIONS
# Acute on Chronic Left Wrist Pain: Long-standing condition for patient over the past several years in the setting of having rheumatoid arthritis. She did have an acute injury after a fall one month ago with worsening pain. She does have pain over the radial ulnar side of her wrist with limited range of motion secondary to tightness and pain. X-rays today showing severe radial carpal arthritis and widening of the scapholunate interval. Likely cause of her pain due to flare of arthritis. Given this plan to treat as below and follow-up as needed.  Image Findings: arthritis of left wrist  Treatment: Activities as tolerated, home exercises given today, can use wrist brace as needed  Job: no restrictions   Medications/Injections: Limited tylenol/ibuprofen for pain for 1-2 weeks, left radial carpal/ulnar carpal steroid injection  Follow-up: In one month if symptoms do not improve, sooner if worsening  Can consider hand therapy, left wrist MRI     Please call [phone]   Ask for my team if you have any questions or concerns    If you have not yet received the influenza vaccine but would like to get one, please call  [phone] or you can schedule via [employer]    It was great seeing you today!    Arash Slaughter MD, CAQSM     [facility] Injection Discharge Instructions    Procedure: Left wrist joint steroid injection    You may shower, however avoid swimming, tub baths or hot tubs for 24 hours following your procedure  You may have a mild to moderate increase in pain for several days following the injection.  It may take up to 14 days for the steroid medication to start working although you may feel the effect as early as a few days after the procedure.  You may use ice packs for 10-15 minutes, 3 to 4 times a day at the injection site for comfort  You may use anti-inflammatory medications (such as Ibuprofen or Aleve or Advil) or Tylenol for pain control if necessary  If you were fasting, you may resume your normal diet  and medications after the procedure  If you have diabetes, check your blood sugar more frequently than usual as your blood sugar may be higher than normal for 10-14 days following a steroid injection. Contact your doctor who manages your diabetes if your blood sugar is higher than usual    If you experience any of the following, call [facility] @ [phone] or [phone]  -Fever over 100 degree F  -Swelling, bleeding, redness, drainage, warmth at the injection site  - New or worsening pain

## 2022-10-17 RX ORDER — BETAMETHASONE SODIUM PHOSPHATE AND BETAMETHASONE ACETATE 3; 3 MG/ML; MG/ML
6 INJECTION, SUSPENSION INTRA-ARTICULAR; INTRALESIONAL; INTRAMUSCULAR; SOFT TISSUE
Status: SHIPPED | OUTPATIENT
Start: 2022-10-13

## 2022-10-17 RX ORDER — ROPIVACAINE HYDROCHLORIDE 5 MG/ML
2 INJECTION, SOLUTION EPIDURAL; INFILTRATION; PERINEURAL
Status: SHIPPED | OUTPATIENT
Start: 2022-10-13

## 2022-10-24 ENCOUNTER — OFFICE VISIT (OUTPATIENT)
Dept: RHEUMATOLOGY | Facility: CLINIC | Age: 37
End: 2022-10-24
Payer: COMMERCIAL

## 2022-10-24 VITALS
WEIGHT: 152.4 LBS | SYSTOLIC BLOOD PRESSURE: 117 MMHG | DIASTOLIC BLOOD PRESSURE: 83 MMHG | HEART RATE: 88 BPM | BODY MASS INDEX: 23.52 KG/M2 | OXYGEN SATURATION: 100 %

## 2022-10-24 DIAGNOSIS — Z79.899 HIGH RISK MEDICATION USE: ICD-10-CM

## 2022-10-24 DIAGNOSIS — M25.562 PAIN AND SWELLING OF LEFT KNEE: ICD-10-CM

## 2022-10-24 DIAGNOSIS — M25.462 PAIN AND SWELLING OF LEFT KNEE: ICD-10-CM

## 2022-10-24 DIAGNOSIS — M06.9 RHEUMATOID ARTHRITIS, INVOLVING UNSPECIFIED SITE, UNSPECIFIED WHETHER RHEUMATOID FACTOR PRESENT (H): Primary | ICD-10-CM

## 2022-10-24 LAB
ALBUMIN SERPL-MCNC: 3.5 G/DL (ref 3.4–5)
ALP SERPL-CCNC: 49 U/L (ref 40–150)
ALT SERPL W P-5'-P-CCNC: 26 U/L (ref 0–50)
AST SERPL W P-5'-P-CCNC: 32 U/L (ref 0–45)
BASOPHILS # BLD AUTO: 0.1 10E3/UL (ref 0–0.2)
BASOPHILS NFR BLD AUTO: 1 %
BILIRUB DIRECT SERPL-MCNC: <0.1 MG/DL (ref 0–0.2)
BILIRUB SERPL-MCNC: 0.3 MG/DL (ref 0.2–1.3)
CREAT SERPL-MCNC: 0.9 MG/DL (ref 0.52–1.04)
CRP SERPL-MCNC: 27.4 MG/L (ref 0–8)
CRYSTALS SNV MICRO: NORMAL
EOSINOPHIL # BLD AUTO: 0.1 10E3/UL (ref 0–0.7)
EOSINOPHIL NFR BLD AUTO: 1 %
ERYTHROCYTE [DISTWIDTH] IN BLOOD BY AUTOMATED COUNT: 14.4 % (ref 10–15)
ERYTHROCYTE [SEDIMENTATION RATE] IN BLOOD BY WESTERGREN METHOD: 10 MM/HR (ref 0–20)
GFR SERPL CREATININE-BSD FRML MDRD: 85 ML/MIN/1.73M2
HCT VFR BLD AUTO: 40.7 % (ref 35–47)
HGB BLD-MCNC: 13.4 G/DL (ref 11.7–15.7)
LYMPHOCYTES # BLD AUTO: 2.4 10E3/UL (ref 0.8–5.3)
LYMPHOCYTES NFR BLD AUTO: 33 %
MCH RBC QN AUTO: 29.1 PG (ref 26.5–33)
MCHC RBC AUTO-ENTMCNC: 32.9 G/DL (ref 31.5–36.5)
MCV RBC AUTO: 89 FL (ref 78–100)
MONOCYTES # BLD AUTO: 0.9 10E3/UL (ref 0–1.3)
MONOCYTES NFR BLD AUTO: 12 %
NEUTROPHILS # BLD AUTO: 3.7 10E3/UL (ref 1.6–8.3)
NEUTROPHILS NFR BLD AUTO: 53 %
PLATELET # BLD AUTO: 342 10E3/UL (ref 150–450)
PROT SERPL-MCNC: 7.2 G/DL (ref 6.8–8.8)
RBC # BLD AUTO: 4.6 10E6/UL (ref 3.8–5.2)
WBC # BLD AUTO: 7.1 10E3/UL (ref 4–11)

## 2022-10-24 PROCEDURE — 87205 SMEAR GRAM STAIN: CPT | Performed by: INTERNAL MEDICINE

## 2022-10-24 PROCEDURE — 87070 CULTURE OTHR SPECIMN AEROBIC: CPT | Performed by: INTERNAL MEDICINE

## 2022-10-24 PROCEDURE — 20611 DRAIN/INJ JOINT/BURSA W/US: CPT | Mod: LT | Performed by: INTERNAL MEDICINE

## 2022-10-24 PROCEDURE — 85025 COMPLETE CBC W/AUTO DIFF WBC: CPT | Performed by: INTERNAL MEDICINE

## 2022-10-24 PROCEDURE — 86481 TB AG RESPONSE T-CELL SUSP: CPT | Performed by: INTERNAL MEDICINE

## 2022-10-24 PROCEDURE — 86140 C-REACTIVE PROTEIN: CPT | Performed by: INTERNAL MEDICINE

## 2022-10-24 PROCEDURE — 99214 OFFICE O/P EST MOD 30 MIN: CPT | Mod: 25 | Performed by: INTERNAL MEDICINE

## 2022-10-24 PROCEDURE — 82565 ASSAY OF CREATININE: CPT | Performed by: INTERNAL MEDICINE

## 2022-10-24 PROCEDURE — 89060 EXAM SYNOVIAL FLUID CRYSTALS: CPT | Performed by: INTERNAL MEDICINE

## 2022-10-24 PROCEDURE — 87075 CULTR BACTERIA EXCEPT BLOOD: CPT | Performed by: INTERNAL MEDICINE

## 2022-10-24 PROCEDURE — 85652 RBC SED RATE AUTOMATED: CPT | Performed by: INTERNAL MEDICINE

## 2022-10-24 PROCEDURE — 80076 HEPATIC FUNCTION PANEL: CPT | Performed by: INTERNAL MEDICINE

## 2022-10-24 PROCEDURE — 36415 COLL VENOUS BLD VENIPUNCTURE: CPT | Performed by: INTERNAL MEDICINE

## 2022-10-24 PROCEDURE — 89051 BODY FLUID CELL COUNT: CPT | Performed by: INTERNAL MEDICINE

## 2022-10-24 RX ORDER — TRIAMCINOLONE ACETONIDE 40 MG/ML
40 INJECTION, SUSPENSION INTRA-ARTICULAR; INTRAMUSCULAR ONCE
Status: COMPLETED | OUTPATIENT
Start: 2022-10-24 | End: 2022-10-24

## 2022-10-24 RX ADMIN — TRIAMCINOLONE ACETONIDE 40 MG: 40 INJECTION, SUSPENSION INTRA-ARTICULAR; INTRAMUSCULAR at 15:29

## 2022-10-24 NOTE — PROGRESS NOTES
Rheumatology Clinic Visit      Lillie Sánchez MRN# [id number]   YOB: 1985 Age: 37 year old      Date of visit: 10/24/22   PCP: Kehr, Kristen M    Chief Complaint   Patient presents with:  Arthritis: Left wrist pain, has bakers cysts on both legs.    Assessment and Plan     1.  Seropositive rheumatoid arthritis (RF negative, CCP >340): Reportedly diagnosed with rheumatoid arthritis in 2019.  Previously followed by Dr. Murphy at an outside clinic.  Establish care with me on 4/25/2022 at which point she had severe inflammatory arthritis affecting the knees and wrists; intermittent symptoms at the sternum.  Previously on Humira (ineffective), Enbrel (ineffective).  Currently on methotrexate 17.5 mg once weekly and hydroxychloroquine 200 mg daily, and methylprednisolone 6 mg daily (reportedly prednisone was not as effective).  In April 2022 the dose of methotrexate was increased, hydroxychloroquine was increased, and sulfasalazine was started.  She then missed her follow-up appointment.  She presents today with no synovitis of the wrist that was previously seen, left wrist pain that is due to degenerative and related to a fall 2 weeks ago that did not improve with a steroid injection from sports medicine and advised that she monitor it for now, and ice the left wrist.  Also with left knee effusion so arthrocentesis and steroid injection were performed today that will help guide therapy.  If the left knee synovial fluid is inflammatory then consider adding Orencia. Chronic illness, progressive  - Continue methotrexate 20 mg once weekly, if labs are okay  - Continue folic acid 1mg daily  - Continue hydroxychloroquine  300 mg daily (reportedly had last eye exam at [facility] in [facility]; repeat eye exam including 10-2 VF and SD-OCT is required)  - Continue sulfasalazine 1000 mg twice daily if labs are okay  - Continue methylprednisolone  4 mg daily  - Labs today: CBC, creatinine, hepatic panel, ESR,  CRP, QuantiFERON-TB gold plus  - Arthrocentesis and steroid injection of the left knee as documented in the procedure section    High risk medication requiring intensive toxicity monitoring at least quarterly: labs ordered include CBC, Creatinine, Hepatic panel to monitor for cytopenia and hepatotoxicity; checking creatinine as it affects clearance of medication.     # Pregnancy plans: Reports being on birth control and does not desire conception within the next 1 year.  She verbalized understanding about the teratogenic potential of DMARDs and that if any change in pregnancy status or plans for pregnancy that she should notify me immediately, and preferably plan ahead so that medications can be adjusted.    2. Left knee pain and swelling: see #1    3.  Vaccinations: Vaccinations reviewed with Ms. Sánchez.  Risks and benefits of vaccinations were discussed.    - Influenza: encouraged yearly vaccination  - [id number]: refused by patient  - [id number]: refused by patient  - COVID19: Refused by patient.      Total minutes spent in evaluation with patient, documentation, , and review of pertinent studies and chart notes: 24    Ms. Sánchez verbalized agreement with and understanding of the rational for the diagnosis and treatment plan.  All questions were answered to best of my ability and the patient's satisfaction. Ms. Sánchez was advised to contact the clinic with any questions that may arise after the clinic visit.      Thank you for involving me in the care of the patient    Return to clinic: 2 weeks    HPI   Lillie Sánchez is a 37 year old female with a past medical history significant for depression, tobacco use, migraines, hypothyroidism, and rheumatoid arthritis who presents for follow-up of rheumatoid arthritis.     6/3/2021 [facility] rheumatology note by Dr. Ronny Murphy documents rheumatoid arthritis.  On methotrexate and Enbrel.  Enbrel was started in  February.  Humira was used previously but symptoms are returning 1 week prior to next dose.  Skin rash developed so it was recommended to stop TNF inhibitors and she was to see dermatology for evaluation of the rash.  Methotrexate was changed from oral to subcutaneous.  Methotrexate changed to 0.7 mL injections.  Prednisone was started at 15 mg via 15 mg per 5 mL solution     4/25/2022: Lillie reports being diagnosed with rheumatoid arthritis in 2019.  Currently with terrible joint pain involving the knees, wrists, and sternum.  Currently on prednisone 2mL of the 15mg/5mL solution of methylprednisolone, methotrexate 17.5mg PO wkly (says that SQ was not more effective in the past, and has not been on a higher dose of methotrexate), hydroxychloroquine 200mg daily (follows with [facility] in [facility], with last eye exam in late 2020 or early 2021; planning to have a repeat eye exam soon).  2 weeks ago had a ruptured Baker's cyst on the right knee; was seen by orthopedics where fluid was removed from both knees and both knees were injected with some improvement.  Still with occasional burning pain going down the right calf.  Did not find Humira or Enbrel to be effective.    8/1/2022: no show to scheduled appointment.     Today, 10/24/2022: fell over her dog and hurt the left wrist 2 weeks ago; went to see sports medicine where a steroid injection of the left wrist was given without much improvement.  Has bilateral knee pain that is worse with activity; feels like she has a left Baker's cyst.  Other joints are doing well.  Does not feel like the medication changes made in April 2022 were helpful.    Denies fevers, chills, nausea, vomiting, constipation, diarrhea. No abdominal pain.  No black or bloody stools.. No chest pain/pressure, palpitations, or shortness of breath. No LE swelling. No neck pain. No oral or nasal sores.  No rash. No sicca symptoms.     Tobacco: Former smoker  EtOH: Rarely  Drugs: None    ROS    12 point review of system was completed and negative except as noted in the HPI     Active Problem List     Patient Active Problem List   Diagnosis     Hypothyroidism     CARDIOVASCULAR SCREENING; LDL GOAL LESS THAN 160     Migraine     Right shoulder pain     Tobacco abuse     Menorrhagia     Other specified hypothyroidism     Mild major depression (H)     Rheumatoid arthritis (H)     Past Medical History     Past Medical History:   Diagnosis Date     NO ACTIVE PROBLEMS      Rheumatoid arthritis (H) 2/23/2021     Thyroid disease      Past Surgical History     Past Surgical History:   Procedure Laterality Date     NO HISTORY OF SURGERY       Allergy     Allergies   Allergen Reactions     Vicodin [Hydrocodone-Acetaminophen]      nausea     Current Medication List     Current Outpatient Medications   Medication Sig     cholecalciferol (VITAMIN D3) 25 mcg (1000 units) capsule Take 1,000 Units by mouth     folic acid (FOLVITE) 1 MG tablet Take 1 tablet (1 mg) by mouth daily     hydroxychloroquine (PLAQUENIL) 200 MG tablet Hydroxychloroquine 300 mg (1.5 tablets) daily     ketoconazole (NIZORAL) 2 % external shampoo Apply daily to skin in the shower / leave for 5 minutes / then wash off     levothyroxine (SYNTHROID/LEVOTHROID) 137 MCG tablet Take 1 tablet (137 mcg) by mouth daily     methotrexate sodium 2.5 MG TABS Take 8 tablets (20 mg) by mouth once a week . Take all 8 tablets on the same day of each week.     methylPREDNISolone (MEDROL) 4 MG tablet Take 1 tablet (4 mg) by mouth daily     norgestrel-ethinyl estradiol (LO/OVRAL) 0.3-30 MG-MCG tablet Take 1 tablet by mouth daily     sertraline (ZOLOFT) 50 MG tablet Take 1 tablet (50 mg) by mouth daily     sulfaSALAzine (AZULFIDINE) 500 MG tablet Sulfasalazine 500 mg twice daily x7days, then 1000 mg twice daily thereafter     VITAMIN D PO      Current Facility-Administered Medications   Medication     betamethasone acet & sod phos (CELESTONE) injection 6 mg      "ropivacaine (NAROPIN) injection 2 mL     triamcinolone (KENALOG-40) injection 40 mg         Social History   See HPI    Family History     Family History   Problem Relation Age of Onset     Breast Cancer Other 50        2 aunts     Mental Illness Other      Depression Sister      Depression Mother      Thyroid Disease Mother         multiple maternal relatives with hypothyroidism     Depression Maternal Grandmother      Thyroid Disease Maternal Grandmother      Physical Exam     Temp Readings from Last 3 Encounters:   09/08/22 97.6  F (36.4  C) (Tympanic)   06/29/22 97.3  F (36.3  C) (Tympanic)   03/07/22 98.6  F (37  C) (Oral)     BP Readings from Last 5 Encounters:   10/24/22 117/83   10/13/22 112/70   09/08/22 109/72   06/29/22 119/74   04/25/22 120/78     Pulse Readings from Last 1 Encounters:   10/24/22 88     Resp Readings from Last 1 Encounters:   10/18/18 16     Estimated body mass index is 23.52 kg/m  as calculated from the following:    Height as of 10/13/22: 1.715 m (5' 7.5\").    Weight as of this encounter: 69.1 kg (152 lb 6.4 oz).    GEN: NAD.   HEENT:  Anicteric, noninjected sclera. No obvious external lesions of the ear and nose. Hearing intact.  CV: S1, S2. RRR. No m/r/g  PULM: No increased work of breathing. CTA bilaterally   MSK: MCPs, PIPs, DIPs without swelling or tenderness to palpation.  Left wrist mildly tender to palpation but no synovial swelling, increased warmth, or overlying erythema.  Right wrist without swelling or tenderness to palpation.    Elbows and shoulders without swelling or tenderness to palpation.  Hips nontender to palpation.  Right knee without swelling or tenderness to palpation.  Left knee with moderate effusion, diffusely tender to palpation, but no increased warmth or overlying erythema; small Baker's cyst on the left knee.  Ankles without swelling or tenderness to palpation.  Negative MTP squeeze bilaterally.  SKIN: No rash or jaundice seen  PSYCH: Alert. Appropriate. "     Labs / Imaging (select studies)   RF/CCP  Recent Labs   Lab Test 01/31/20  1505 10/03/19  1228   CCPIGG >340*  --    RHF  --  <20     CBC  Recent Labs   Lab Test 06/20/22  1107 05/23/22  1131 04/25/22  1245 01/31/20  1505 10/03/19  1228   WBC 9.4 7.4 9.2   < > 8.0   RBC 4.58 4.53 5.21*   < > 4.79   HGB 13.0 12.9 14.5   < > 14.1   HCT 39.8 39.5 44.9   < > 42.3   MCV 87 87 86   < > 88   RDW 14.0 15.0 16.5*   < > 13.1    399 495*   < > 320   MCH 28.4 28.5 27.8   < > 29.4   MCHC 32.7 32.7 32.3   < > 33.3   NEUTROPHIL 66 57 59  --  58.6   LYMPH 27 35 31  --  31.4   MONOCYTE 6 6 8  --  6.0   EOSINOPHIL 1 1 1  --  3.0   BASOPHIL 1 2 1  --  1.0   ANEU  --   --   --   --  4.7   ALYM  --   --   --   --  2.5   DONOVAN  --   --   --   --  0.5   AEOS  --   --   --   --  0.2   ABAS  --   --   --   --  0.1   ANEUTAUTO 6.2 4.2 5.4  --   --    ALYMPAUTO 2.5 2.5 2.8  --   --    AMONOAUTO 0.6 0.5 0.8  --   --    AEOSAUTO 0.1 0.1 0.1  --   --    ABSBASO 0.1 0.1 0.1  --   --     < > = values in this interval not displayed.     CMP  Recent Labs   Lab Test 06/20/22  1107 05/23/22  1131 04/25/22  1245 03/04/21  1440 06/16/20  1401 01/31/20  1505 10/03/19  1228   NA  --   --   --   --   --   --  140   POTASSIUM  --   --   --   --   --   --  4.0   CHLORIDE  --   --   --   --   --   --  107   CO2  --   --   --   --   --   --  28   ANIONGAP  --   --   --   --   --   --  5   GLC  --  99 82  --   --   --  77   BUN  --   --   --   --   --   --  13   CR 0.78 0.83 0.74 0.86 0.82 0.59 0.78   GFRESTIMATED >90 >90 >90 88 >90 >90 >90   GFRESTBLACK  --   --   --  >90 >90 >90 >90   BHAVIN  --   --   --   --   --   --  9.1   BILITOTAL 0.3 0.4 0.3  --   --   --  0.3   ALBUMIN 3.4 3.4 3.5  --   --   --  4.0   PROTTOTAL 7.3 7.5 7.7  --   --   --  7.6   ALKPHOS 54 52 76  --   --   --  54   AST 12 19 17 16 14 13 23   ALT 16 21 23 42 27 31 39     Calcium/VitaminD  Recent Labs   Lab Test 10/03/19  1228   BHAVIN 9.1     ESR/CRP  Recent Labs   Lab Test  05/23/22  1131 04/25/22  1245 03/04/21  1440   SED 16 10 12   CRP 40.8* 32.9* 30.5*     Hepatitis B  Recent Labs   Lab Test 04/25/22  1245   HBCAB Nonreactive   HEPBANG Nonreactive     Hepatitis C  Recent Labs   Lab Test 04/25/22  1245   HCVAB Nonreactive     Lyme ab screening  Recent Labs   Lab Test 10/30/19  1232   LYMEGM 0.17     Tuberculosis Screening  Recent Labs   Lab Test 06/16/20  1401   TBRES Negative     Immunization History     Immunization History   Administered Date(s) Administered     HPV 08/17/2010, 10/18/2010, 10/12/2011     HPV Quadrivalent 08/17/2010, 10/18/2010     Influenza (IIV3) PF 10/12/2011     TD (ADULT, 7+) 08/06/2009     TDAP Vaccine (Adacel) 10/12/2011     Tdap (Adacel,Boostrix) 02/29/2016     Procedure     Procedure: Arthrocentesis and steroid injection of the left knee, with ultrasound guidance  Indication: Pain and swelling of the left knee    The procedure was explained in detail. Risks including infection, pain, structural damage such as cartilage damage and tendon rupture, and medication reaction was explained. The option of not doing the procedure was also provided. All questions were answered and the patient consented to the procedure.     A time-out was performed and the correct patient, procedure, and laterality were verified.  The left knee was examined and location for injection was identified. The area was cleaned with chlorhexidine, twice.  Ethyl chloride was then used for topical anaesthetic. Then, lidocaine 1% 3mL was injected subcutaneously and in deeper tissue for local anaesthetic. Then using a 3.5 inch 18 gauge needle, fluid was aspirated from the joint with ultrasound guidance, and 2 ultrasound images in perpendicular planes were stored. Then without removing the needle from the joint space, syringes were changed and a mixture of lidocaine 1% 2 mL and Kenalog 40mg was injected into the intra-articular space.     The patient tolerated the procedure well. No  complications.    Fluid collected:  Appearance: Mildly viscous cloudy yellow  Volume: 35 mL    Fluid was sent to the lab for crystal analysis, cell count, and culture.      1% Lidocaine  : Hospira  Lot #: [id number]  EXPIRATION DATE: 1 SEPT 2023  NDC: 1100-5791-63    MEDICATION: Triamcinolone 40 mg  LOT #: [id number]  EXPIRATION DATE:  02/2024  NDC#: [address]-9         Chart documentation done in part with Dragon Voice recognition Software. Although reviewed after completion, some word and grammatical error may remain.    Tanner Cintron MD

## 2022-10-24 NOTE — PATIENT INSTRUCTIONS
RHEUMATOLOGY    Dr. Tanner Cintron    [facility]  [address] [place] [address]  [place] [address]  Phone number: [phone]  Fax number: [phone]    You may schedule your FLU shot by calling [phone] or if you would like to get your shot at a [place] pharmacy you may schedule online at [email or web address].    Thank you for choosing [facility]!    Zoey Hernandez CMA Rheumatology

## 2022-10-25 LAB
APPEARANCE FLD: ABNORMAL
CELL COUNT BODY FLUID SOURCE: ABNORMAL
COLOR FLD: YELLOW
LYMPHOCYTES NFR FLD MANUAL: 17 %
MONOS+MACROS NFR FLD MANUAL: 15 %
NEUTS BAND NFR FLD MANUAL: 68 %
WBC # FLD AUTO: ABNORMAL /UL

## 2022-10-26 LAB
GAMMA INTERFERON BACKGROUND BLD IA-ACNC: 0.06 IU/ML
M TB IFN-G BLD-IMP: NEGATIVE
M TB IFN-G CD4+ BCKGRND COR BLD-ACNC: 9.94 IU/ML
MITOGEN IGNF BCKGRD COR BLD-ACNC: 0.01 IU/ML
MITOGEN IGNF BCKGRD COR BLD-ACNC: 0.01 IU/ML
QUANTIFERON MITOGEN: 10 IU/ML
QUANTIFERON NIL TUBE: 0.06 IU/ML
QUANTIFERON TB1 TUBE: 0.07 IU/ML
QUANTIFERON TB2 TUBE: 0.07

## 2022-10-29 LAB
BACTERIA SNV CULT: NO GROWTH
GRAM STAIN RESULT: NORMAL
GRAM STAIN RESULT: NORMAL

## 2022-11-07 ENCOUNTER — TELEPHONE (OUTPATIENT)
Dept: RHEUMATOLOGY | Facility: CLINIC | Age: 37
End: 2022-11-07

## 2022-11-07 LAB — BACTERIA SNV CULT: NORMAL

## 2022-11-07 NOTE — LETTER
[facility]  [address]  [place] [address]  Phone: [phone]  Fax: [phone]    November 7, 2022    Lillie Sánchez                                                                                                                                                       [id number] [address]              Dear Lillie,    The synovial fluid cell count is consistent with an inflammatory process, and the cultures are negative.  This indicates that the synovial fluid in the knee was due to active rheumatoid arthritis.  Rheumatoid arthritis treatment escalation is advised and was going to be discussed at your appointment on 11/7/2022 that was missed.  Recommend rescheduling either a video visit or an in-office visit to discuss treatment options.    Sincerely,      Tanner Cintron MD   Rheumatology

## 2022-11-07 NOTE — TELEPHONE ENCOUNTER
Called patient with no answer. Letter mailed per MD Arlin HOWE, RN Specialty Triage 11/7/2022 3:46 PM

## 2022-11-07 NOTE — TELEPHONE ENCOUNTER
RN: Please call to notify Lillie Sánchez that the synovial fluid cell count is consistent with an inflammatory process, and the cultures are negative.  This indicates that the synovial fluid in the knee was due to active rheumatoid arthritis.  Rheumatoid arthritis treatment escalation is advised and was going to be discussed at today's appointment that was missed.  Recommend rescheduling either a video visit or an in-office visit to discuss treatment options.    (if no answer by the patient then please mail a letter)    Tanner Cintron MD  11/7/2022

## 2023-05-22 ENCOUNTER — PATIENT OUTREACH (OUTPATIENT)
Dept: CARE COORDINATION | Facility: CLINIC | Age: 38
End: 2023-05-22
Payer: COMMERCIAL

## 2023-06-06 ENCOUNTER — PATIENT OUTREACH (OUTPATIENT)
Dept: CARE COORDINATION | Facility: CLINIC | Age: 38
End: 2023-06-06
Payer: COMMERCIAL

## 2023-09-24 ENCOUNTER — HEALTH MAINTENANCE LETTER (OUTPATIENT)
Age: 38
End: 2023-09-24

## 2024-01-10 ENCOUNTER — VIRTUAL VISIT (OUTPATIENT)
Dept: FAMILY MEDICINE | Facility: CLINIC | Age: 39
End: 2024-01-10
Payer: COMMERCIAL

## 2024-01-10 DIAGNOSIS — E03.8 OTHER SPECIFIED HYPOTHYROIDISM: Primary | ICD-10-CM

## 2024-01-10 DIAGNOSIS — Z30.09 ENCOUNTER FOR OTHER GENERAL COUNSELING OR ADVICE ON CONTRACEPTION: ICD-10-CM

## 2024-01-10 DIAGNOSIS — M06.9 RHEUMATOID ARTHRITIS, INVOLVING UNSPECIFIED SITE, UNSPECIFIED WHETHER RHEUMATOID FACTOR PRESENT (H): ICD-10-CM

## 2024-01-10 PROCEDURE — 99213 OFFICE O/P EST LOW 20 MIN: CPT | Mod: 95 | Performed by: PHYSICIAN ASSISTANT

## 2024-01-10 RX ORDER — LEVOTHYROXINE SODIUM 137 UG/1
137 TABLET ORAL DAILY
Qty: 90 TABLET | Refills: 0 | Status: SHIPPED | OUTPATIENT
Start: 2024-01-10 | End: 2024-03-14

## 2024-01-10 ASSESSMENT — ANXIETY QUESTIONNAIRES
7. FEELING AFRAID AS IF SOMETHING AWFUL MIGHT HAPPEN: SEVERAL DAYS
GAD7 TOTAL SCORE: 9
IF YOU CHECKED OFF ANY PROBLEMS ON THIS QUESTIONNAIRE, HOW DIFFICULT HAVE THESE PROBLEMS MADE IT FOR YOU TO DO YOUR WORK, TAKE CARE OF THINGS AT HOME, OR GET ALONG WITH OTHER PEOPLE: SOMEWHAT DIFFICULT
5. BEING SO RESTLESS THAT IT IS HARD TO SIT STILL: NOT AT ALL
8. IF YOU CHECKED OFF ANY PROBLEMS, HOW DIFFICULT HAVE THESE MADE IT FOR YOU TO DO YOUR WORK, TAKE CARE OF THINGS AT HOME, OR GET ALONG WITH OTHER PEOPLE?: SOMEWHAT DIFFICULT
GAD7 TOTAL SCORE: 9
4. TROUBLE RELAXING: SEVERAL DAYS
1. FEELING NERVOUS, ANXIOUS, OR ON EDGE: MORE THAN HALF THE DAYS
GAD7 TOTAL SCORE: 9
7. FEELING AFRAID AS IF SOMETHING AWFUL MIGHT HAPPEN: SEVERAL DAYS
6. BECOMING EASILY ANNOYED OR IRRITABLE: MORE THAN HALF THE DAYS
3. WORRYING TOO MUCH ABOUT DIFFERENT THINGS: SEVERAL DAYS
2. NOT BEING ABLE TO STOP OR CONTROL WORRYING: MORE THAN HALF THE DAYS

## 2024-01-10 ASSESSMENT — PATIENT HEALTH QUESTIONNAIRE - PHQ9
10. IF YOU CHECKED OFF ANY PROBLEMS, HOW DIFFICULT HAVE THESE PROBLEMS MADE IT FOR YOU TO DO YOUR WORK, TAKE CARE OF THINGS AT HOME, OR GET ALONG WITH OTHER PEOPLE: VERY DIFFICULT
SUM OF ALL RESPONSES TO PHQ QUESTIONS 1-9: 11
SUM OF ALL RESPONSES TO PHQ QUESTIONS 1-9: 11

## 2024-01-10 NOTE — PROGRESS NOTES
"    Instructions Relayed to Patient by Virtual Roomer:     Patient is active on Jobaline:   Relayed following to patient: \"It looks like you are active on Jobaline, are you able to join the visit this way? If not, do you need us to send you a link now or would you like your provider to send a link via text or email when they are ready to initiate the visit?\"    Reminded patient to ensure they were logged on to virtual visit by arrival time listed. Documented in appointment notes if patient had flexibility to initiate visit sooner than arrival time. If pediatric virtual visit, ensured pediatric patient along with parent/guardian will be present for video visit.     Patient offered the website [email or web address] and/or phone number to Jobaline Help line: [phone]    Lillie is a 38 year old who is being evaluated via a billable video visit.      How would you like to obtain your AVS? [employer]  If the video visit is dropped, the invitation should be resent by: Text to cell phone: [phone]  Will anyone else be joining your video visit? No      Assessment & Plan     Other specified hypothyroidism  She will get back on the last dose of the levothyroxine 137 mcg daily.   Refill x 90 sent to the pharmacy.   She will schedule a lab only appointment for 2 months.   Plan follow up via Ninite with any adjustment needed.   - levothyroxine (SYNTHROID/LEVOTHROID) 137 MCG tablet; Take 1 tablet (137 mcg) by mouth daily  - Ob/Gyn  Referral; Future  - **TSH with free T4 reflex FUTURE 2mo; Future    She was also previously treated for depression with sertraline. She has been off of this medication also and does not wish to go back on it at this time. She will start with getting back on track with thyroid and rheumatoid arthritis. Will continue to monitor without medication. Notify if any changes or concerns. Her PHQ 9 score was a bit high today, but all pertaining to fatigue / tiredness. This may be due " to her other chronic conditions. If there are changes in moods, she will reach out or make an appointment to address.     Encounter for other general counseling or advice on contraception  Referral for GYN provided to discuss tubal ligation    Rheumatoid arthritis, involving unspecified site, unspecified whether rheumatoid factor present (H)  Managed by Rheumatology               Kristen M. Kehr, PA-C [facility]    Gladys Moreira is a 38 year old, presenting for the following health issues:  Recheck Medication, Thyroid Problem, and MH Follow Up  - Would like to discuss sertraline before anymore refills  -Plans on talking with her RA doc and either getting back on or figuring out a new regimen.       1/10/2024     1:44 PM   Additional Questions   Roomed by Earle WALDROP   Accompanied by Self     Lillie is here today for hypothyroid. She has been out of her medication for months.   She was previously taking levothyroxine 137 mcg daily. She has noticed fatigue again, this is her main symptom.     She also is interested in discussing tubal ligation with GYN. She will need a referral to the appropriate provider.         History of Present Illness       Mental Health Follow-up:  Patient presents to follow-up on Anxiety.    Patient's anxiety since last visit has been:  Medium  The patient is having other symptoms associated with anxiety.  Any significant life events: health concerns  Patient is not feeling anxious or having panic attacks.  Patient has no concerns about alcohol or drug use.    Hypothyroidism:     Since last visit, patient describes the following symptoms::  Anxiety, Fatigue and Weight loss    Weight loss::  >20 lbs.    She eats 2-3 servings of fruits and vegetables daily.She consumes 4 sweetened beverage(s) daily.She exercises with enough effort to increase her heart rate 9 or less minutes per day.  She exercises with enough effort to increase her heart rate 3 or less days per  week. She is missing 7 dose(s) of medications per week.  She is not taking prescribed medications regularly due to side effects and other.               Review of Systems   Constitutional, HEENT, cardiovascular, pulmonary, GI, , musculoskeletal, neuro, skin, endocrine and psych systems are negative, except as otherwise noted.      Objective           Vitals:  No vitals were obtained today due to virtual visit.    Physical Exam   GENERAL: Healthy, alert and no distress  EYES: Eyes grossly normal to inspection.  No discharge or erythema, or obvious scleral/conjunctival abnormalities.  RESP: No audible wheeze, cough, or visible cyanosis.  No visible retractions or increased work of breathing.    SKIN: Visible skin clear. No significant rash, abnormal pigmentation or lesions.  NEURO: Cranial nerves grossly intact.  Mentation and speech appropriate for age.  PSYCH: Mentation appears normal, affect normal/bright, judgement and insight intact, normal speech and appearance well-groomed.                Video-Visit Details    Type of service:  Video Visit     Originating Location (pt. Location): Home    Distant Location (provider location):  On-site  Platform used for Video Visit: Louis

## 2024-01-11 ENCOUNTER — ANCILLARY PROCEDURE (OUTPATIENT)
Dept: GENERAL RADIOLOGY | Facility: CLINIC | Age: 39
End: 2024-01-11
Attending: INTERNAL MEDICINE
Payer: COMMERCIAL

## 2024-01-11 ENCOUNTER — OFFICE VISIT (OUTPATIENT)
Dept: RHEUMATOLOGY | Facility: CLINIC | Age: 39
End: 2024-01-11
Payer: COMMERCIAL

## 2024-01-11 ENCOUNTER — TELEPHONE (OUTPATIENT)
Dept: RHEUMATOLOGY | Facility: CLINIC | Age: 39
End: 2024-01-11

## 2024-01-11 VITALS — HEART RATE: 105 BPM | OXYGEN SATURATION: 100 % | DIASTOLIC BLOOD PRESSURE: 85 MMHG | SYSTOLIC BLOOD PRESSURE: 116 MMHG

## 2024-01-11 DIAGNOSIS — M06.9 RHEUMATOID ARTHRITIS, INVOLVING UNSPECIFIED SITE, UNSPECIFIED WHETHER RHEUMATOID FACTOR PRESENT (H): ICD-10-CM

## 2024-01-11 DIAGNOSIS — M06.9 RHEUMATOID ARTHRITIS, INVOLVING UNSPECIFIED SITE, UNSPECIFIED WHETHER RHEUMATOID FACTOR PRESENT (H): Primary | ICD-10-CM

## 2024-01-11 LAB
BASOPHILS # BLD AUTO: 0.1 10E3/UL (ref 0–0.2)
BASOPHILS NFR BLD AUTO: 1 %
EOSINOPHIL # BLD AUTO: 0.1 10E3/UL (ref 0–0.7)
EOSINOPHIL NFR BLD AUTO: 1 %
ERYTHROCYTE [DISTWIDTH] IN BLOOD BY AUTOMATED COUNT: 14.7 % (ref 10–15)
ERYTHROCYTE [SEDIMENTATION RATE] IN BLOOD BY WESTERGREN METHOD: 33 MM/HR (ref 0–20)
HCT VFR BLD AUTO: 37.7 % (ref 35–47)
HGB BLD-MCNC: 12.1 G/DL (ref 11.7–15.7)
IMM GRANULOCYTES # BLD: 0 10E3/UL
IMM GRANULOCYTES NFR BLD: 0 %
LYMPHOCYTES # BLD AUTO: 2.2 10E3/UL (ref 0.8–5.3)
LYMPHOCYTES NFR BLD AUTO: 28 %
MCH RBC QN AUTO: 25.5 PG (ref 26.5–33)
MCHC RBC AUTO-ENTMCNC: 32.1 G/DL (ref 31.5–36.5)
MCV RBC AUTO: 79 FL (ref 78–100)
MONOCYTES # BLD AUTO: 0.5 10E3/UL (ref 0–1.3)
MONOCYTES NFR BLD AUTO: 6 %
NEUTROPHILS # BLD AUTO: 5 10E3/UL (ref 1.6–8.3)
NEUTROPHILS NFR BLD AUTO: 64 %
PLATELET # BLD AUTO: 523 10E3/UL (ref 150–450)
RBC # BLD AUTO: 4.75 10E6/UL (ref 3.8–5.2)
WBC # BLD AUTO: 7.9 10E3/UL (ref 4–11)

## 2024-01-11 PROCEDURE — 86140 C-REACTIVE PROTEIN: CPT | Performed by: INTERNAL MEDICINE

## 2024-01-11 PROCEDURE — 99215 OFFICE O/P EST HI 40 MIN: CPT | Performed by: INTERNAL MEDICINE

## 2024-01-11 PROCEDURE — 87340 HEPATITIS B SURFACE AG IA: CPT | Performed by: INTERNAL MEDICINE

## 2024-01-11 PROCEDURE — 71046 X-RAY EXAM CHEST 2 VIEWS: CPT | Mod: TC | Performed by: RADIOLOGY

## 2024-01-11 PROCEDURE — 85652 RBC SED RATE AUTOMATED: CPT | Performed by: INTERNAL MEDICINE

## 2024-01-11 PROCEDURE — 85025 COMPLETE CBC W/AUTO DIFF WBC: CPT | Performed by: INTERNAL MEDICINE

## 2024-01-11 PROCEDURE — 86481 TB AG RESPONSE T-CELL SUSP: CPT | Performed by: INTERNAL MEDICINE

## 2024-01-11 PROCEDURE — 82565 ASSAY OF CREATININE: CPT | Performed by: INTERNAL MEDICINE

## 2024-01-11 PROCEDURE — 80076 HEPATIC FUNCTION PANEL: CPT | Performed by: INTERNAL MEDICINE

## 2024-01-11 PROCEDURE — 86803 HEPATITIS C AB TEST: CPT | Performed by: INTERNAL MEDICINE

## 2024-01-11 PROCEDURE — 86704 HEP B CORE ANTIBODY TOTAL: CPT | Performed by: INTERNAL MEDICINE

## 2024-01-11 PROCEDURE — 36415 COLL VENOUS BLD VENIPUNCTURE: CPT | Performed by: INTERNAL MEDICINE

## 2024-01-11 RX ORDER — PREDNISONE 5 MG/1
TABLET ORAL
Qty: 165 TABLET | Refills: 0 | Status: SHIPPED | OUTPATIENT
Start: 2024-01-11 | End: 2024-03-26

## 2024-01-11 NOTE — TELEPHONE ENCOUNTER
[facility] Call Center    Phone Message    May a detailed message be left on voicemail: yes     Reason for Call: Medication Question or concern regarding medication   Prescription Clarification  Name of Medication: Prednisone   Prescribing Provider: Dr Cintron    Pharmacy: [id number] - [place] - [address] [address]    What on the order needs clarification? Pharmacy is calling to clarify taper.    Action Taken: Message routed to:  Other: KYE Cintron    Travel Screening: Not Applicable

## 2024-01-11 NOTE — NURSING NOTE
RAPID3 (0-30) Cumulative Score  21          RAPID3 Weighted Score (divide #4 by 3 and that is the weighted score)  7

## 2024-01-11 NOTE — PROGRESS NOTES
Rheumatology Clinic Visit      Lillie Sánchez MRN# [id number]   YOB: 1985 Age: 38 year old      Date of visit: 1/11/24   PCP: Kehr, Kristen M    Chief Complaint   Patient presents with:  RECHECK: Follow up. Quit all medications about 1 year ago    Assessment and Plan     1.  Seropositive rheumatoid arthritis (RF negative, CCP >340): Reportedly diagnosed with rheumatoid arthritis in 2019.  Previously followed by Dr. Murphy at an outside clinic.  Establish care with me on 4/25/2022.  Previously on Humira (ineffective), Enbrel (ineffective), methotrexate (felt more depressed, intermittent rashes that she associates with methotrexate), hydroxychloroquine, sulfasalazine.  I first evaluated on 4/25/2022 at which point she had severe inflammatory arthritis affecting the knees and wrists, and intermittent symptoms at the sternum; at that appointment methotrexate dose was increased, hydroxychloroquine dose was increased, and sulfasalazine was started; she was then lost to follow-up, later presenting on 10/24/2022 where she was significantly improved but found to have inflammatory effusion of the knee that was injected with steroids.  She was then lost to follow-up again, presenting on 1/11/2024 reporting that she stopped all treatments in 2022 after the last appointment because she associated a rash and depression symptoms with methotrexate, and states that she feels better arthritis symptoms worsened since stopping medications.  1/11/2024: Synovitis of the L>R wrists and knees.  Reviewed the diagnosis of rheumatoid arthritis, discussing both the articular and extra-articular manifestations of the disease, and the treatment options.  She prefers to avoid methotrexate because of the associated depressed feeling and rashes.  Hydroxychloroquine and sulfasalazine were partially effective previously but then she was lost to follow-up.  We discussed hydroxychloroquine, sulfasalazine, leflunomide, Xeljanz,  Rinvoq, and Orencia for treatment.  She is not currently on birth control, does not wish to start new birth control at this time because she plans for tubal ligation within the next 2 months.  Considering lack of birth control currently, we discussed using hydroxychloroquine, sulfasalazine, Orencia and after thorough discussion the plan is to start Orencia.  Prednisone for current symptoms.  Handicap parking sticker provided for 14-month duration, the patient's request and is reasonable considering severe arthritis.  Chronic illness, progressive, severe  - Start Orencia 125 mg SQ every 7 days if screening is okay  - Start prednisone 40 mg daily x 5 days, then 20 mg daily x 5 days, then 15 mg daily x 5 days, then 10 mg daily x 30 days, then 5 mg daily x 30 days, then stop   - Chest x-ray today  - Labs today: CBC, creatinine, hepatic panel, ESR, CRP, hepatitis B core antibody and surface antigen, QuantiFERON-TB gold plus              Rapid 3, cumulative scores                      1/11/2024: 21 (no treatment)    # Leflunomide (Arava) Risks and Benefits: The risks and benefits of leflunomide were discussed in detail and the patient verbalized understanding.  The risks discussed include, but are not limited to, the risk for hypersensitivity, anaphylaxis, anaphylactoid reactions, hepatotoxicity, infections, interstitial lung disease, alopecia, rash, nausea, and diarrhea.  The impact on malignancies is not fully defined.   Alcohol should be avoided while taking leflunomide.  Pregnancy prevention and planning was discussed; it is recommended that women of childbearing potential use reliable contraception before, during, and for a period of 2 years after treatment with leflunomide; if pregnancy is planned within 2 years of discontinuing medication then women should undergo drug elimination procedures (i.e. cholestyramine). Routine laboratory monitoring is required during leflunomide therapy. I encouraged reviewing the  package insert and asking any questions about the medication.      # Abatacept (Orencia) Risks and Benefits: The risks and benefits of abatacept were discussed in detail and the patient verbalized understanding.  The risks discussed include, but are not limited to, the risk for hypersensitivity, anaphylaxis, anaphylactoid reactions, an increased risk for serious infections leading to hospitalization or death, and an increased risk for more frequent respiratory adverse events in patients with COPD.  If subcutaneous injections are used, then injection site reactions and/or pain may occur at the site of injection.  The most common side effects were discussed that include headache, upper respiratory tract infections, nasopharyngitis, and nausea.  It was discussed that the medication would need to be discontinued if a serious infection develops.  It was discussed that live vaccinations should not be received while using abatacept or within 30 days prior to starting abatacept.  I encouraged reviewing the package insert and asking any questions about the medication.      # Pregnancy plans: Not currently on birth control.  Planning to meet with OB next month to discuss tubal ligation.     2.  Vaccinations: Vaccinations reviewed with Ms. Sánchez.  Risks and benefits of vaccinations were discussed.    - Influenza: encouraged yearly vaccination  - Ayeirie12: refused by patient  - Fusouitdu52: refused by patient  - COVID19: Refused by patient.      Total minutes spent in evaluation with patient, documentation, , and review of pertinent studies and chart notes: 48    Ms. Sánchez verbalized agreement with and understanding of the rational for the diagnosis and treatment plan.  All questions were answered to best of my ability and the patient's satisfaction. Ms. Sánchez was advised to contact the clinic with any questions that may arise after the clinic visit.      Thank you for involving me in the care of the  patient    Return to clinic: 3 months  HPI   Lillie Katya Sánchez is a 38 year old female with a past medical history significant for depression, tobacco use, migraines, hypothyroidism, and rheumatoid arthritis who presents for follow-up of rheumatoid arthritis.     6/3/2021 [facility] rheumatology note by Dr. Ronny Murphy documents rheumatoid arthritis.  On methotrexate and Enbrel.  Enbrel was started in February.  Humira was used previously but symptoms are returning 1 week prior to next dose.  Skin rash developed so it was recommended to stop TNF inhibitors and she was to see dermatology for evaluation of the rash.  Methotrexate was changed from oral to subcutaneous.  Methotrexate changed to 0.7 mL injections.  Prednisone was started at 15 mg via 15 mg per 5 mL solution     4/25/2022: Lillie reports being diagnosed with rheumatoid arthritis in 2019.  Currently with terrible joint pain involving the knees, wrists, and sternum.  Currently on prednisone 2mL of the 15mg/5mL solution of methylprednisolone, methotrexate 17.5mg PO wkly (says that SQ was not more effective in the past, and has not been on a higher dose of methotrexate), hydroxychloroquine 200mg daily (follows with [facility] in [facility], with last eye exam in late 2020 or early 2021; planning to have a repeat eye exam soon).  2 weeks ago had a ruptured Baker's cyst on the right knee; was seen by orthopedics where fluid was removed from both knees and both knees were injected with some improvement.  Still with occasional burning pain going down the right calf.  Did not find Humira or Enbrel to be effective.    8/1/2022: no show to scheduled appointment.     10/24/2022: fell over her dog and hurt the left wrist 2 weeks ago; went to see sports medicine where a steroid injection of the left wrist was given without much improvement.  Has bilateral knee pain that is worse with activity; feels like she has a left Baker's cyst.   Other joints are doing well.  Does not feel like the medication changes made in April 2022 were helpful.    11/7/2022: No-show to scheduled appointment    Today, 1/11/2024: Today she reports that she stopped methotrexate, sulfasalazine, and hydroxychloroquine shortly after the last rheumatology visit in 2022.  She associated methotrexate with a rash that occurred intermittently and depression symptoms.  After stopping all of her medications the rash resolved and depression symptoms resolved.  Arthritis worsened.  Limited range of motion of the left wrist, similar to previous.  Mild swelling and pain of both wrist.  Swelling and pain of both knees.  Knee pain is worse in the morning and improves with time and activity.  Planning for tubal ligation, that she says typically occurs within 30 days after seeing OB/GYN and she has an OB/GYN appointment on 2/15/2024    Denies fevers, chills, nausea, vomiting, constipation, diarrhea. No abdominal pain.  No black or bloody stools.. No chest pain/pressure, palpitations, or shortness of breath. No LE swelling. No neck pain. No oral or nasal sores.  No rash. No sicca symptoms.     Tobacco: Former smoker  EtOH: Rarely  Drugs: None    ROS   12 point review of system was completed and negative except as noted in the HPI     Active Problem List     Patient Active Problem List   Diagnosis    Hypothyroidism    CARDIOVASCULAR SCREENING; LDL GOAL LESS THAN 160    Migraine    Right shoulder pain    Tobacco abuse    Menorrhagia    Other specified hypothyroidism    Mild major depression (H24)    Rheumatoid arthritis (H)     Past Medical History     Past Medical History:   Diagnosis Date    NO ACTIVE PROBLEMS     Rheumatoid arthritis (H) 2/23/2021    Thyroid disease      Past Surgical History     Past Surgical History:   Procedure Laterality Date    NO HISTORY OF SURGERY       Allergy     Allergies   Allergen Reactions    Vicodin [Hydrocodone-Acetaminophen]      nausea     Current  "Medication List     Current Outpatient Medications   Medication Sig    levothyroxine (SYNTHROID/LEVOTHROID) 137 MCG tablet Take 1 tablet (137 mcg) by mouth daily    predniSONE (DELTASONE) 5 MG tablet Take 8 tablets (40 mg) by mouth daily for 5 days, THEN 4 tablets (20 mg) daily for 5 days, THEN 3 tablets (15 mg) daily for 5 days, THEN 2 tablets (10 mg) daily for 30 days, THEN 1 tablet (5 mg) daily for 30 days.    cholecalciferol (VITAMIN D3) 25 mcg (1000 units) capsule Take 1,000 Units by mouth (Patient not taking: Reported on 1/10/2024)    VITAMIN D PO  (Patient not taking: Reported on 1/10/2024)     Current Facility-Administered Medications   Medication    betamethasone acet & sod phos (CELESTONE) injection 6 mg    ropivacaine (NAROPIN) injection 2 mL         Social History   See HPI    Family History     Family History   Problem Relation Age of Onset    Breast Cancer Other 50        2 aunts    Mental Illness Other     Depression Sister     Depression Mother     Thyroid Disease Mother         multiple maternal relatives with hypothyroidism    Depression Maternal Grandmother     Thyroid Disease Maternal Grandmother      Physical Exam     Temp Readings from Last 3 Encounters:   09/08/22 97.6  F (36.4  C) (Tympanic)   06/29/22 97.3  F (36.3  C) (Tympanic)   03/07/22 98.6  F (37  C) (Oral)     BP Readings from Last 5 Encounters:   01/11/24 116/85   10/24/22 117/83   10/13/22 112/70   09/08/22 109/72   06/29/22 119/74     Pulse Readings from Last 1 Encounters:   01/11/24 105     Resp Readings from Last 1 Encounters:   10/18/18 16     Estimated body mass index is 23.52 kg/m  as calculated from the following:    Height as of 10/13/22: 1.715 m (5' 7.5\").    Weight as of 10/24/22: 69.1 kg (152 lb 6.4 oz).    GEN: NAD.   HEENT:  Anicteric, noninjected sclera. No obvious external lesions of the ear and nose. Hearing intact.  CV: S1, S2. RRR. No m/r/g  PULM: No increased work of breathing. CTA bilaterally   MSK: MCPs, PIPs, " DIPs without swelling or tenderness to palpation.  Left wrist with mild synovial swelling and tenderness to palpation; limited range of motion.  Right wrist with mild synovial swelling and tenderness to palpation.  Elbows and shoulders without swelling or tenderness to palpation.  Both knees with synovial swelling and tenderness to palpation, increased warmth bilaterally.  Ankles without swelling or tenderness to palpation.  Negative MTP squeeze bilaterally.    SKIN: No rash or jaundice seen  PSYCH: Alert. Appropriate.     Labs / Imaging (select studies)   RF/CCP  Recent Labs   Lab Test 01/31/20  1505 10/03/19  1228   CCPIGG >340*  --    RHF  --  <20     CHARLEE  Recent Labs   Lab Test 01/31/20  1505   SEDA Positive*   ANAP1 SPECKLED   ANAT1 1:160     RNP/Sm/SSA/SSB  Recent Labs   Lab Test 01/31/20  1505   SSAIGG <0.2   SSBIGG <0.2     CBC  Recent Labs   Lab Test 10/24/22  1456 06/20/22  1107 05/23/22  1131 01/31/20  1505 10/03/19  1228   WBC 7.1 9.4 7.4   < > 8.0   RBC 4.60 4.58 4.53   < > 4.79   HGB 13.4 13.0 12.9   < > 14.1   HCT 40.7 39.8 39.5   < > 42.3   MCV 89 87 87   < > 88   RDW 14.4 14.0 15.0   < > 13.1    431 399   < > 320   MCH 29.1 28.4 28.5   < > 29.4   MCHC 32.9 32.7 32.7   < > 33.3   NEUTROPHIL 53 66 57   < > 58.6   LYMPH 33 27 35   < > 31.4   MONOCYTE 12 6 6   < > 6.0   EOSINOPHIL 1 1 1   < > 3.0   BASOPHIL 1 1 2   < > 1.0   ANEU  --   --   --   --  4.7   ALYM  --   --   --   --  2.5   DONOVAN  --   --   --   --  0.5   AEOS  --   --   --   --  0.2   ABAS  --   --   --   --  0.1   ANEUTAUTO 3.7 6.2 4.2   < >  --    ALYMPAUTO 2.4 2.5 2.5   < >  --    AMONOAUTO 0.9 0.6 0.5   < >  --    AEOSAUTO 0.1 0.1 0.1   < >  --    ABSBASO 0.1 0.1 0.1   < >  --     < > = values in this interval not displayed.     CMP  Recent Labs   Lab Test 10/24/22  1456 06/20/22  1107 05/23/22  1131 04/25/22  1245 03/04/21  1440 06/16/20  1401 01/31/20  1505 10/03/19  1228   NA  --   --   --   --   --   --   --  140   POTASSIUM   --   --   --   --   --   --   --  4.0   CHLORIDE  --   --   --   --   --   --   --  107   CO2  --   --   --   --   --   --   --  28   ANIONGAP  --   --   --   --   --   --   --  5   GLC  --   --  99 82  --   --   --  77   BUN  --   --   --   --   --   --   --  13   CR 0.90 0.78 0.83 0.74 0.86 0.82 0.59 0.78   GFRESTIMATED 85 >90 >90 >90 88 >90 >90 >90   GFRESTBLACK  --   --   --   --  >90 >90 >90 >90   BHAVIN  --   --   --   --   --   --   --  9.1   BILITOTAL 0.3 0.3 0.4 0.3  --   --   --  0.3   ALBUMIN 3.5 3.4 3.4 3.5  --   --   --  4.0   PROTTOTAL 7.2 7.3 7.5 7.7  --   --   --  7.6   ALKPHOS 49 54 52 76  --   --   --  54   AST 32 12 19 17 16 14 13 23   ALT 26 16 21 23 42 27 31 39       Iron Studies  Recent Labs   Lab Test 10/30/19  1232   CHUN 17     Calcium/VitaminD  Recent Labs   Lab Test 10/03/19  1228   BHAVIN 9.1     ESR/CRP  Recent Labs   Lab Test 10/24/22  1456 05/23/22  1131 04/25/22  1245   SED 10 16 10   CRP 27.4* 40.8* 32.9*       TSH/T4  Recent Labs   Lab Test 06/29/22  1137 03/04/21  1440 10/03/19  1228 11/07/18  1525 10/18/18  1300 02/26/16  1522 12/21/15  1704   TSH 3.25 3.27 10.50*   < > 5.32*   < > 4.69*   T4  --   --  0.81  --  1.00  --  0.88    < > = values in this interval not displayed.     Hepatitis B  Recent Labs   Lab Test 04/25/22  1245   HBCAB Nonreactive   HEPBANG Nonreactive     Hepatitis C  Recent Labs   Lab Test 04/25/22  1245   HCVAB Nonreactive     Lyme ab screening  Recent Labs   Lab Test 10/30/19  1232   LYMEGM 0.17     Tuberculosis Screening  Recent Labs   Lab Test 10/24/22  1456 06/16/20  1401   TBRES Negative Negative     Synovial Fluid Studies  Recent Labs   Lab Test 10/24/22  1450   FCOL Yellow   FAPR Cloudy*   FWBC 19,725   FNEU 68   FLYM 17   FMONO 15   CRYSTL No clinically significant crystals seen.     Immunization History     Immunization History   Administered Date(s) Administered    HPV 08/17/2010, 10/18/2010, 10/12/2011    HPV Quadrivalent 08/17/2010, 10/18/2010    Influenza  (IIV3) PF 10/12/2011    TD,PF 7+ (Tenivac) 08/06/2009    TDAP (Adacel,Boostrix) 02/29/2016    TDAP Vaccine (Adacel) 10/12/2011            Chart documentation done in part with Dragon Voice recognition Software. Although reviewed after completion, some word and grammatical error may remain.    Tanner Cintron MD

## 2024-01-12 LAB
ALBUMIN SERPL BCG-MCNC: 4.2 G/DL (ref 3.5–5.2)
ALP SERPL-CCNC: 64 U/L (ref 40–150)
ALT SERPL W P-5'-P-CCNC: 7 U/L (ref 0–50)
AST SERPL W P-5'-P-CCNC: 19 U/L (ref 0–45)
BILIRUB DIRECT SERPL-MCNC: <0.2 MG/DL (ref 0–0.3)
BILIRUB SERPL-MCNC: 0.3 MG/DL
CREAT SERPL-MCNC: 0.69 MG/DL (ref 0.51–0.95)
CRP SERPL-MCNC: 29.9 MG/L
EGFRCR SERPLBLD CKD-EPI 2021: >90 ML/MIN/1.73M2
HBV CORE AB SERPL QL IA: NONREACTIVE
HBV SURFACE AG SERPL QL IA: NONREACTIVE
HCV AB SERPL QL IA: NONREACTIVE
PROT SERPL-MCNC: 7.9 G/DL (ref 6.4–8.3)

## 2024-01-12 NOTE — TELEPHONE ENCOUNTER
Returned call to the pharmacy to discuss message below. There was no error in prescription.     KERA Torres RN 1/12/2024 12:33 PM

## 2024-01-13 LAB
GAMMA INTERFERON BACKGROUND BLD IA-ACNC: 0.02 IU/ML
M TB IFN-G BLD-IMP: NEGATIVE
M TB IFN-G CD4+ BCKGRND COR BLD-ACNC: 9.98 IU/ML
MITOGEN IGNF BCKGRD COR BLD-ACNC: 0 IU/ML
MITOGEN IGNF BCKGRD COR BLD-ACNC: 0 IU/ML
QUANTIFERON MITOGEN: 10 IU/ML
QUANTIFERON NIL TUBE: 0.02 IU/ML
QUANTIFERON TB1 TUBE: 0.02 IU/ML
QUANTIFERON TB2 TUBE: 0.02

## 2024-01-15 DIAGNOSIS — M05.9 SEROPOSITIVE RHEUMATOID ARTHRITIS (H): Primary | ICD-10-CM

## 2024-01-15 RX ORDER — ABATACEPT 125 MG/ML
125 INJECTION, SOLUTION SUBCUTANEOUS
Qty: 4 ML | Refills: 4 | Status: SHIPPED | OUTPATIENT
Start: 2024-01-15 | End: 2024-05-02

## 2024-01-16 ENCOUNTER — TELEPHONE (OUTPATIENT)
Dept: RHEUMATOLOGY | Facility: CLINIC | Age: 39
End: 2024-01-16
Payer: COMMERCIAL

## 2024-01-16 NOTE — TELEPHONE ENCOUNTER
PA Initiation    Medication: ORENCIA CLICKJECT 125 MG/ML SC SOAJ  Insurance Company: [employer] - Phone [phone] Fax [phone]  [place] [facility] [place] [place]  Filling Pharmacy Phone: [phone]  Filling Pharmacy Fax: [phone]  Start Date: 1/16/2024  [email or web address]  DARION (Key: [id number])        Thank You,     Cathy Vargas CPhT  Special[facility] Pharmacy [facility] Special[facility]  [email or web address]  [email or web address]  Phone: [phone]  Fax: [phone]

## 2024-01-17 NOTE — TELEPHONE ENCOUNTER
Prior Authorization Approval    Medication: ORENCIA CLICKJECT 125 MG/ML SC SOAJ  Authorization Effective Date: 1/16/2024  Authorization Expiration Date: 1/16/2025  Approved Dose/Quantity: 4 / 28  Reference #: DARION (Key: [id number])   Insurance Company: [employer] - Phone [phone] Fax [phone]  Expected CoPay: $ 0  CoPay Card Available: Other (see comments)    Financial Assistance Needed: [email or web address]   Which Pharmacy is filling the prescription: [facility]  Pharmacy Notified: yes  Patient Notified: yes          Thank You,     Cathy Vargas CPhT  [facility] Pharmacy [facility] Specialty  [email or web address]  [email or web address]  Phone: [phone]  Fax: [phone]

## 2024-02-02 NOTE — PATIENT INSTRUCTIONS
If you have any questions regarding your visit, Please contact your care team.     [facility]: [phone]    To Schedule an Appointment 24/7  Call: [phone] TELEPHONE NUMBER     Terrell Torres MD  Medical Director        Malathi-JAMES Martinez-RN  Maryam Deutsch-Surgery Scheduler  Randa-Surgery Scheduler               Tuesday-[place]  7:30 a.m-4:30 p.m    Thursday-[place]  7:30 a.m-4:30 p.m    Typical Surgery Days: Tuesday or Friday [facility]  [address] [place] [address]  PH: [phone]    Imaging Scheduling all locations  PH: [phone]     [facility]  [address] [place] [address] [place] [address]  PH: [phone]    [facility]  [address] [place] [address]  PH: [phone] Fax      **Surgeries** Our Surgery Schedulers will contact you to schedule. If you do not receive a call within 3 business days, please call [phone].    Urgent Care locations:  [facility] Monday-Friday  10 am - 8 pm  Saturday and Sunday   9 am - 5 pm  Monday-Friday   10 am - 8 pm  Saturday and Sunday   9 am - 5 pm   [phone]   If you need a medication refill, please contact your pharmacy. Please allow 3 business days for your refill to be completed.  As always, Thank you for trusting us with your healthcare needs!    see additional instructions from your care team below

## 2024-02-15 ENCOUNTER — TELEPHONE (OUTPATIENT)
Dept: OBGYN | Facility: CLINIC | Age: 39
End: 2024-02-15

## 2024-02-15 ENCOUNTER — OFFICE VISIT (OUTPATIENT)
Dept: OBGYN | Facility: CLINIC | Age: 39
End: 2024-02-15
Attending: PHYSICIAN ASSISTANT
Payer: COMMERCIAL

## 2024-02-15 VITALS
SYSTOLIC BLOOD PRESSURE: 108 MMHG | DIASTOLIC BLOOD PRESSURE: 74 MMHG | HEART RATE: 89 BPM | OXYGEN SATURATION: 100 % | BODY MASS INDEX: 20.8 KG/M2 | WEIGHT: 134.8 LBS

## 2024-02-15 DIAGNOSIS — E03.8 OTHER SPECIFIED HYPOTHYROIDISM: ICD-10-CM

## 2024-02-15 DIAGNOSIS — Z30.09 CONSULTATION FOR STERILIZATION: Primary | ICD-10-CM

## 2024-02-15 PROCEDURE — 99203 OFFICE O/P NEW LOW 30 MIN: CPT | Performed by: OBSTETRICS & GYNECOLOGY

## 2024-02-15 NOTE — TELEPHONE ENCOUNTER
Associated Diagnoses    Consultation for sterilization [Z30.09]  - Primary        Source Order Set    Order Set Name Order ID    [id number]     Case Request: Case Info    Panel 1    Providers    Provider Role Service   Terrell Torres MD Primary      Procedures    Procedure Laterality Anesthesia Region   Laparoscopic Bilateral Tubal Ligation Bilateral General Abdomen                  Requested date:   Location:  OR   Patient class:      Pre-op diagnoses: Consultation for sterilization     Scheduling Instructions    Additional Instructions for the Case  Procedure notes:    Procedure length:  30 min  Diagnosis:  Desires Sterilization  Request additional equipment:    Location:  [facility]  Sterilization consent signed:  2/15/2024  OR staff assist:  no  H&P to be completed by PCP  Post op appointment needed:  no  Scheduling instructions:   SURGERY SCHEDULING AND PRECERTIFICATION    Medical Record Number: [id number]  Lillie Sánchez  YOB: 1985   Phone: [phone] (home) [phone] (work)  Primary Provider: Kehr, Kristen M    Reason for Admit:  ICD-10 CODE:  Z30.09    Surgeon: Terrell Torres MD  Surgical Procedure: Laparoscopic Bilateral Tubal Ligation    Date of Surgery 03/19 Time of Surgery 9:45am  Surgery to be performed at:  [facility]   Status: Outpatient  Type of Anesthesia Anticipated: General    Sterilization consent:  Yes and was signed on 02/15.    Pre-Op: On 03/13 with Kristen Kehr  at 9:45am  COVID testing:  Per Provider's discretion Covid testing is not indicated.     Post-Op:  N/A    Pre-certification routed to [employer]:  Auto routes via Case Request    Surgery packet mailed to patient's home address: Yes  Patient instructed NPO 12 hours prior to surgery, arrive according to the time the nurse gives patient when called prior to surgery, must have a .  Patient understood and agrees to the plan.       Requestor:  June Benavides     Location:  [facility]'s [phone]

## 2024-02-15 NOTE — PROGRESS NOTES
Lillie is a 38 year old   here to discuss tubal ligation.  .  ROS: No urinary frequency or dysuria, bladder or kidney problems, Normal menstrual cycles  ROS: Ten point review of systems was reviewed and negative except the above.    PMH: Her past medical, surgical, and obstetric histories were reviewed and are documented in their appropriate chart areas.    ALL/Meds: Her medication and allergy histories were reviewed and are documented in their appropriate chart areas.    SH/FMH: Reviewed and documented in the appropriate area.    PE: /74 (BP Location: Left arm, Patient Position: Sitting, Cuff Size: Adult Regular)   Pulse 89   Wt 61.1 kg (134 lb 12.8 oz)   LMP 2024 (Approximate)   SpO2 100%   BMI 20.80 kg/m      Reviewed the option of Tubal ligation versus Salpingectomy.  Discussed the reasoning for removal of the tube, in that it may decrease her risk of cancer in the future.  Reviewed the risks,benefits and alternatives of Tubal Ligation/Salpingectomy including but not limited to bleeding, infection, injury to bowel,bladder and other organs, failure rate of 2-3/1000 for tubal ligation, increased risk of ectopic or tubal pregnancy.  Reviewed permanence of procedure. Reviewed pre and post op course.  All questions answered.  Pt appears to understand and desires to proceed with ligation      A/P:   Lillie presents with ((Z30.09) Consultation for sterilization  (primary encounter diagnosis)  Comment: 30 minutes spent on the date of the encounter doing chart review, patient visit, and documentation    Plan: Case Request: Laparoscopic Bilateral Tubal         Ligation            No orders of the defined types were placed in this encounter.        Terrell Torres MD FACOG

## 2024-03-13 ENCOUNTER — LAB (OUTPATIENT)
Dept: LAB | Facility: CLINIC | Age: 39
End: 2024-03-13
Payer: COMMERCIAL

## 2024-03-13 ENCOUNTER — OFFICE VISIT (OUTPATIENT)
Dept: FAMILY MEDICINE | Facility: CLINIC | Age: 39
End: 2024-03-13
Payer: COMMERCIAL

## 2024-03-13 VITALS
BODY MASS INDEX: 20.46 KG/M2 | TEMPERATURE: 98.1 F | HEART RATE: 96 BPM | RESPIRATION RATE: 16 BRPM | WEIGHT: 135 LBS | HEIGHT: 68 IN | DIASTOLIC BLOOD PRESSURE: 82 MMHG | OXYGEN SATURATION: 99 % | SYSTOLIC BLOOD PRESSURE: 116 MMHG

## 2024-03-13 DIAGNOSIS — Z30.9 ENCOUNTER FOR CONTRACEPTIVE MANAGEMENT, UNSPECIFIED TYPE: ICD-10-CM

## 2024-03-13 DIAGNOSIS — E03.8 OTHER SPECIFIED HYPOTHYROIDISM: ICD-10-CM

## 2024-03-13 DIAGNOSIS — Z01.818 PREOP GENERAL PHYSICAL EXAM: Primary | ICD-10-CM

## 2024-03-13 LAB
T4 FREE SERPL-MCNC: 0.93 NG/DL (ref 0.9–1.7)
TSH SERPL DL<=0.005 MIU/L-ACNC: 6.69 UIU/ML (ref 0.3–4.2)

## 2024-03-13 PROCEDURE — 99214 OFFICE O/P EST MOD 30 MIN: CPT | Performed by: PHYSICIAN ASSISTANT

## 2024-03-13 PROCEDURE — 84439 ASSAY OF FREE THYROXINE: CPT

## 2024-03-13 PROCEDURE — 36415 COLL VENOUS BLD VENIPUNCTURE: CPT

## 2024-03-13 PROCEDURE — 84443 ASSAY THYROID STIM HORMONE: CPT

## 2024-03-13 ASSESSMENT — PATIENT HEALTH QUESTIONNAIRE - PHQ9
10. IF YOU CHECKED OFF ANY PROBLEMS, HOW DIFFICULT HAVE THESE PROBLEMS MADE IT FOR YOU TO DO YOUR WORK, TAKE CARE OF THINGS AT HOME, OR GET ALONG WITH OTHER PEOPLE: SOMEWHAT DIFFICULT
SUM OF ALL RESPONSES TO PHQ QUESTIONS 1-9: 11
SUM OF ALL RESPONSES TO PHQ QUESTIONS 1-9: 11

## 2024-03-13 ASSESSMENT — PAIN SCALES - GENERAL: PAINLEVEL: SEVERE PAIN (7)

## 2024-03-13 NOTE — PATIENT INSTRUCTIONS
Preparing for Your Surgery  Getting started  A nurse will call you to review your health history and instructions. They will give you an arrival time based on your scheduled surgery time. Please be ready to share:  Your doctor's clinic name and phone number  Your medical, surgical, and anesthesia history  A list of allergies and sensitivities  A list of medicines, including herbal treatments and over-the-counter drugs  Whether the patient has a legal guardian (ask how to send us the papers in advance)  Please tell us if you're pregnant--or if there's any chance you might be pregnant. Some surgeries may injure a fetus (unborn baby), so they require a pregnancy test. Surgeries that are safe for a fetus don't always need a test, and you can choose whether to have one.   If you have a child who's having surgery, please ask for a copy of Preparing for Your Child's Surgery.    Preparing for surgery  Within 10 to 30 days of surgery: Have a pre-op exam (sometimes called an H&P, or History and Physical). This can be done at a clinic or pre-operative center.  If you're having a , you may not need this exam. Talk to your care team.  At your pre-op exam, talk to your care team about all medicines you take. If you need to stop any medicines before surgery, ask when to start taking them again.  We do this for your safety. Many medicines can make you bleed too much during surgery. Some change how well surgery (anesthesia) drugs work.  Call your insurance company to let them know you're having surgery. (If you don't have insurance, call [phone].)  Call your clinic if there's any change in your health. This includes signs of a cold or flu (sore throat, runny nose, cough, rash, fever). It also includes a scrape or scratch near the surgery site.  If you have questions on the day of surgery, call your hospital or surgery center.  Eating and drinking guidelines  For your safety: Unless your surgeon tells you otherwise,  follow the guidelines below.  Eat and drink as usual until 8 hours before you arrive for surgery. After that, no food or milk.  Drink clear liquids until 2 hours before you arrive. These are liquids you can see through, like water, Gatorade, and Propel Water. They also include plain black coffee and tea (no cream or milk), candy, and breath mints. You can spit out gum when you arrive.  If you drink alcohol: Stop drinking it the night before surgery.  If your care team tells you to take medicine on the morning of surgery, it's okay to take it with a sip of water.  Preventing infection  Shower or bathe the night before and morning of your surgery. Follow the instructions your clinic gave you. (If no instructions, use regular soap.)  Don't shave or clip hair near your surgery site. We'll remove the hair if needed.  Don't smoke or vape the morning of surgery. You may chew nicotine gum up to 2 hours before surgery. A nicotine patch is okay.  Note: Some surgeries require you to completely quit smoking and nicotine. Check with your surgeon.  Your care team will make every effort to keep you safe from infection. We will:  Clean our hands often with soap and water (or an alcohol-based hand rub).  Clean the skin at your surgery site with a special soap that kills germs.  Give you a special gown to keep you warm. (Cold raises the risk of infection.)  Wear special hair covers, masks, gowns and gloves during surgery.  Give antibiotic medicine, if prescribed. Not all surgeries need antibiotics.  What to bring on the day of surgery  Photo ID and insurance card  Copy of your health care directive, if you have one  Glasses and hearing aids (bring cases)  You can't wear contacts during surgery  Inhaler and eye drops, if you use them (tell us about these when you arrive)  CPAP machine or breathing device, if you use them  A few personal items, if spending the night  If you have . . .  A pacemaker, ICD (cardiac defibrillator) or other  implant: Bring the ID card.  An implanted stimulator: Bring the remote control.  A legal guardian: Bring a copy of the certified (court-stamped) guardianship papers.  Please remove any jewelry, including body piercings. Leave jewelry and other valuables at home.  If you're going home the day of surgery  You must have a responsible adult drive you home. They should stay with you overnight as well.  If you don't have someone to stay with you, and you aren't safe to go home alone, we may keep you overnight. Insurance often won't pay for this.  After surgery  If it's hard to control your pain or you need more pain medicine, please call your surgeon's office.  Questions?   If you have any questions for your care team, list them here: _________________________________________________________________________________________________________________________________________________________________________ ____________________________________ ____________________________________ ____________________________________  For informational purposes only. Not to replace the advice of your health care provider. Copyright   2003, 2019 [facility]. All rights reserved. Clinically reviewed by Karine Perez MD. [employer]ART[employer] [id number] - REV 12/22.    How to Take Your Medication Before Surgery  - Take all of your medications before surgery as usual

## 2024-03-13 NOTE — PROGRESS NOTES
Preoperative Evaluation  [facility]  [address] [facility] [address]  Phone: [phone]  Primary Provider: Kehr, Kristen M  Pre-op Performing Provider: KEHR, KRISTEN M  Mar 13, 2024   {    Lillie is a 38 year old, presenting for the following:  Pre-Op Exam        3/13/2024     9:49 AM   Additional Questions   Roomed by Mor LEDESMA MA     Surgical Information  Surgery/Procedure: Laparoscopic Bilateral Tubal Ligation   Surgery Location:  OR  Surgeon: Terrell Hodges  Surgery Date: 03/19/24  Time of Surgery: 9:45 am  Where patient plans to recover: At home with family  Fax number for surgical facility: Note does not need to be faxed, will be available electronically in Epic.    Assessment & Plan     The proposed surgical procedure is considered LOW risk.    Preop general physical exam  Encounter for contraceptive management, unspecified type      Other specified hypothyroidism  Thyroid test completed today after consistent use of the levothyroxine 137 mcg daily.             - No identified additional risk factors other than previously addressed    Antiplatelet or Anticoagulation Medication Instructions   - Patient is on no antiplatelet or anticoagulation medications.  She will hold NSAIDS / ASA    Additional Medication Instructions  Patient is to take all scheduled medications on the day of surgery    Recommendation  APPROVAL GIVEN to proceed with proposed procedure, without further diagnostic evaluation.          Subjective       HPI related to upcoming procedure: Lillie will be having a tubal ligation.           3/13/2024     9:46 AM   Preop Questions   1. Have you ever had a heart attack or stroke? No   2. Have you ever had surgery on your heart or blood vessels, such as a stent placement, a coronary artery bypass, or surgery on an artery in your head, neck, heart, or legs? No   3. Do you have chest pain with activity? No   4. Do you have a history of  heart failure? No   5.  Do you currently have a cold, bronchitis or symptoms of other infection? No   6. Do you have a cough, shortness of breath, or wheezing? No   7. Do you or anyone in your family have previous history of blood clots? No   8. Do you or does anyone in your family have a serious bleeding problem such as prolonged bleeding following surgeries or cuts? No   9. Have you ever had problems with anemia or been told to take iron pills? No   10. Have you had any abnormal blood loss such as black, tarry or bloody stools, or abnormal vaginal bleeding? No   11. Have you ever had a blood transfusion? No   12. Are you willing to have a blood transfusion if it is medically needed before, during, or after your surgery? Yes   13. Have you or any of your relatives ever had problems with anesthesia? No   14. Do you have sleep apnea, excessive snoring or daytime drowsiness? No   15. Do you have any artifical heart valves or other implanted medical devices like a pacemaker, defibrillator, or continuous glucose monitor? No   16. Do you have artificial joints? No   17. Are you allergic to latex? No   18. Is there any chance that you may be pregnant? No     Health Care Directive  Patient does not have a Health Care Directive or Living Will: Discussed advance care planning with patient; however, patient declined at this time.    Preoperative Review of    reviewed - no record of controlled substances prescribed.      Status of Chronic Conditions:  HYPOTHYROIDISM - Patient has a longstanding history of chronic Hypothyroidism. Patient has been doing well, noting no tremor, insomnia, hair loss or changes in skin texture. Continues to take medications as directed, without adverse reactions or side effects. Last TSH   Lab Results   Component Value Date    TSH 3.25 06/29/2022   .      Patient Active Problem List    Diagnosis Date Noted    Consultation for sterilization 02/15/2024     Priority: Medium    Rheumatoid arthritis (H) 02/23/2021      Priority: Medium    Mild major depression (H24) 2018     Priority: Medium    Other specified hypothyroidism 2015     Priority: Medium    Menorrhagia 2015     Priority: Medium    Tobacco abuse 2015     Priority: Medium    Right shoulder pain 2014     Priority: Medium    Migraine 10/12/2011     Priority: Medium     Patient given Migraine Education folder and Migraine Action Plan on 2011.  Stefanie Reid RN         CARDIOVASCULAR SCREENING; LDL GOAL LESS THAN 160 10/31/2010     Priority: Medium    Hypothyroidism 2009     Priority: Medium      Past Medical History:   Diagnosis Date    NO ACTIVE PROBLEMS     Rheumatoid arthritis (H) 2021    Thyroid disease      Past Surgical History:   Procedure Laterality Date    NO HISTORY OF SURGERY       Current Outpatient Medications   Medication Sig Dispense Refill    Abatacept (ORENCIA CLICKJECT) 125 MG/ML SOAJ auto-injector Inject 1 mL (125 mg) Subcutaneous every 7 days .  Hold for infection and seek medical attention. 4 mL 4    levothyroxine (SYNTHROID/LEVOTHROID) 137 MCG tablet Take 1 tablet (137 mcg) by mouth daily 90 tablet 0    predniSONE (DELTASONE) 5 MG tablet Take 8 tablets (40 mg) by mouth daily for 5 days, THEN 4 tablets (20 mg) daily for 5 days, THEN 3 tablets (15 mg) daily for 5 days, THEN 2 tablets (10 mg) daily for 30 days, THEN 1 tablet (5 mg) daily for 30 days. 165 tablet 0    cholecalciferol (VITAMIN D3) 25 mcg (1000 units) capsule Take 1,000 Units by mouth      VITAMIN D PO          Allergies   Allergen Reactions    Vicodin [Hydrocodone-Acetaminophen]      nausea        Social History     Tobacco Use    Smoking status: Former     Packs/day: 0.50     Years: 10.00     Additional pack years: 0.00     Total pack years: 5.00     Types: Cigarettes     Start date: 2005     Quit date: 2015     Years since quittin.5    Smokeless tobacco: Never   Substance Use Topics    Alcohol use: Yes     Alcohol/week:  "0.0 standard drinks of alcohol     Family History   Problem Relation Age of Onset    Breast Cancer Other 50        2 aunts    Mental Illness Other     Depression Sister     Depression Mother     Thyroid Disease Mother         multiple maternal relatives with hypothyroidism    Depression Maternal Grandmother     Thyroid Disease Maternal Grandmother      History   Drug Use No         Review of Systems    Review of Systems  Constitutional, HEENT, cardiovascular, pulmonary, GI, , musculoskeletal, neuro, skin, endocrine and psych systems are negative, except as otherwise noted.    Objective    /82   Pulse 96   Temp 98.1  F (36.7  C) (Tympanic)   Resp 16   Ht 1.715 m (5' 7.5\")   Wt 61.2 kg (135 lb)   LMP 02/27/2024 (Approximate)   SpO2 99%   Breastfeeding No   BMI 20.83 kg/m     Estimated body mass index is 20.83 kg/m  as calculated from the following:    Height as of this encounter: 1.715 m (5' 7.5\").    Weight as of this encounter: 61.2 kg (135 lb).  Physical Exam  GENERAL: alert and no distress  NECK: no adenopathy, no asymmetry, masses, or scars  RESP: lungs clear to auscultation - no rales, rhonchi or wheezes  CV: regular rate and rhythm, normal S1 S2, no S3 or S4, no murmur, click or rub, no peripheral edema  ABDOMEN: soft, nontender, no hepatosplenomegaly, no masses and bowel sounds normal  MS: mild swelling in her left hand / wrist and knees due to Rheumatoid arthritis.   SKIN: no suspicious lesions or rashes  PSYCH: mentation appears normal, affect normal/bright    Recent Labs   Lab Test 01/11/24  1538 10/24/22  1456   HGB 12.1 13.4   * 342   CR 0.69 0.90        Diagnostics  Recent Results (from the past 48 hour(s))   **TSH with free T4 reflex FUTURE 2mo    Collection Time: 03/13/24  9:44 AM   Result Value Ref Range    TSH 6.69 (H) 0.30 - 4.20 uIU/mL   T4 free    Collection Time: 03/13/24  9:44 AM   Result Value Ref Range    Free T4 0.93 0.90 - 1.70 ng/dL      No EKG required, no history " of coronary heart disease, significant arrhythmia, peripheral arterial disease or other structural heart disease.    Revised Cardiac Risk Index (RCRI)  The patient has the following serious cardiovascular risks for perioperative complications:   - No serious cardiac risks = 0 points     RCRI Interpretation: 0 points: Class I (very low risk - 0.4% complication rate)         Signed Electronically by: Kristen M. Kehr, PA-C  Copy of this evaluation report is provided to requesting physician.

## 2024-03-14 RX ORDER — LEVOTHYROXINE SODIUM 137 UG/1
137 TABLET ORAL DAILY
Qty: 90 TABLET | Refills: 3 | Status: SHIPPED | OUTPATIENT
Start: 2024-03-14

## 2024-03-18 ENCOUNTER — ANESTHESIA EVENT (OUTPATIENT)
Dept: SURGERY | Facility: AMBULATORY SURGERY CENTER | Age: 39
End: 2024-03-18
Payer: COMMERCIAL

## 2024-03-19 ENCOUNTER — ANESTHESIA (OUTPATIENT)
Dept: SURGERY | Facility: AMBULATORY SURGERY CENTER | Age: 39
End: 2024-03-19
Payer: COMMERCIAL

## 2024-03-19 ENCOUNTER — HOSPITAL ENCOUNTER (OUTPATIENT)
Facility: AMBULATORY SURGERY CENTER | Age: 39
Discharge: HOME | End: 2024-03-19
Attending: OBSTETRICS & GYNECOLOGY | Admitting: OBSTETRICS & GYNECOLOGY
Payer: COMMERCIAL

## 2024-03-19 VITALS
RESPIRATION RATE: 16 BRPM | DIASTOLIC BLOOD PRESSURE: 71 MMHG | TEMPERATURE: 97.7 F | BODY MASS INDEX: 20.83 KG/M2 | OXYGEN SATURATION: 100 % | SYSTOLIC BLOOD PRESSURE: 113 MMHG | WEIGHT: 135 LBS | HEART RATE: 73 BPM

## 2024-03-19 DIAGNOSIS — Z98.890 POST-OPERATIVE STATE: Primary | ICD-10-CM

## 2024-03-19 LAB — HCG UR QL: NEGATIVE

## 2024-03-19 PROCEDURE — G8907 PT DOC NO EVENTS ON DISCHARG: HCPCS

## 2024-03-19 PROCEDURE — G8918 PT W/O PREOP ORDER IV AB PRO: HCPCS

## 2024-03-19 PROCEDURE — 58670 LAPAROSCOPY TUBAL CAUTERY: CPT | Performed by: NURSE ANESTHETIST, CERTIFIED REGISTERED

## 2024-03-19 PROCEDURE — 58661 LAPAROSCOPY REMOVE ADNEXA: CPT | Mod: 50 | Performed by: ANESTHESIOLOGY

## 2024-03-19 PROCEDURE — 81025 URINE PREGNANCY TEST: CPT | Performed by: ANESTHESIOLOGY

## 2024-03-19 PROCEDURE — 58670 LAPAROSCOPY TUBAL CAUTERY: CPT

## 2024-03-19 RX ORDER — FENTANYL CITRATE 50 UG/ML
25 INJECTION, SOLUTION INTRAMUSCULAR; INTRAVENOUS EVERY 5 MIN PRN
Status: DISCONTINUED | OUTPATIENT
Start: 2024-03-19 | End: 2024-03-20 | Stop reason: HOSPADM

## 2024-03-19 RX ORDER — PROPOFOL 10 MG/ML
INJECTION, EMULSION INTRAVENOUS CONTINUOUS PRN
Status: DISCONTINUED | OUTPATIENT
Start: 2024-03-19 | End: 2024-03-19

## 2024-03-19 RX ORDER — SODIUM CHLORIDE, SODIUM LACTATE, POTASSIUM CHLORIDE, CALCIUM CHLORIDE 600; 310; 30; 20 MG/100ML; MG/100ML; MG/100ML; MG/100ML
INJECTION, SOLUTION INTRAVENOUS CONTINUOUS
Status: DISCONTINUED | OUTPATIENT
Start: 2024-03-19 | End: 2024-03-20 | Stop reason: HOSPADM

## 2024-03-19 RX ORDER — OXYCODONE HYDROCHLORIDE 5 MG/1
5-10 TABLET ORAL EVERY 4 HOURS PRN
Qty: 6 TABLET | Refills: 0 | Status: SHIPPED | OUTPATIENT
Start: 2024-03-19

## 2024-03-19 RX ORDER — ONDANSETRON 4 MG/1
4 TABLET, ORALLY DISINTEGRATING ORAL EVERY 30 MIN PRN
Status: DISCONTINUED | OUTPATIENT
Start: 2024-03-19 | End: 2024-03-20 | Stop reason: HOSPADM

## 2024-03-19 RX ORDER — LIDOCAINE HYDROCHLORIDE 20 MG/ML
INJECTION, SOLUTION INFILTRATION; PERINEURAL PRN
Status: DISCONTINUED | OUTPATIENT
Start: 2024-03-19 | End: 2024-03-19

## 2024-03-19 RX ORDER — ONDANSETRON 2 MG/ML
4 INJECTION INTRAMUSCULAR; INTRAVENOUS EVERY 30 MIN PRN
Status: DISCONTINUED | OUTPATIENT
Start: 2024-03-19 | End: 2024-03-20 | Stop reason: HOSPADM

## 2024-03-19 RX ORDER — LIDOCAINE 40 MG/G
CREAM TOPICAL
Status: DISCONTINUED | OUTPATIENT
Start: 2024-03-19 | End: 2024-03-20 | Stop reason: HOSPADM

## 2024-03-19 RX ORDER — NALOXONE HYDROCHLORIDE 0.4 MG/ML
0.1 INJECTION, SOLUTION INTRAMUSCULAR; INTRAVENOUS; SUBCUTANEOUS
Status: DISCONTINUED | OUTPATIENT
Start: 2024-03-19 | End: 2024-03-20 | Stop reason: HOSPADM

## 2024-03-19 RX ORDER — PROPOFOL 10 MG/ML
INJECTION, EMULSION INTRAVENOUS PRN
Status: DISCONTINUED | OUTPATIENT
Start: 2024-03-19 | End: 2024-03-19

## 2024-03-19 RX ORDER — ONDANSETRON 2 MG/ML
INJECTION INTRAMUSCULAR; INTRAVENOUS PRN
Status: DISCONTINUED | OUTPATIENT
Start: 2024-03-19 | End: 2024-03-19

## 2024-03-19 RX ORDER — FENTANYL CITRATE 50 UG/ML
INJECTION, SOLUTION INTRAMUSCULAR; INTRAVENOUS PRN
Status: DISCONTINUED | OUTPATIENT
Start: 2024-03-19 | End: 2024-03-19

## 2024-03-19 RX ORDER — DEXAMETHASONE SODIUM PHOSPHATE 4 MG/ML
INJECTION, SOLUTION INTRA-ARTICULAR; INTRALESIONAL; INTRAMUSCULAR; INTRAVENOUS; SOFT TISSUE PRN
Status: DISCONTINUED | OUTPATIENT
Start: 2024-03-19 | End: 2024-03-19

## 2024-03-19 RX ORDER — KETOROLAC TROMETHAMINE 30 MG/ML
INJECTION, SOLUTION INTRAMUSCULAR; INTRAVENOUS PRN
Status: DISCONTINUED | OUTPATIENT
Start: 2024-03-19 | End: 2024-03-19

## 2024-03-19 RX ORDER — FENTANYL CITRATE 50 UG/ML
50 INJECTION, SOLUTION INTRAMUSCULAR; INTRAVENOUS EVERY 5 MIN PRN
Status: DISCONTINUED | OUTPATIENT
Start: 2024-03-19 | End: 2024-03-20 | Stop reason: HOSPADM

## 2024-03-19 RX ORDER — IBUPROFEN 400 MG/1
800 TABLET, FILM COATED ORAL ONCE
Status: DISCONTINUED | OUTPATIENT
Start: 2024-03-19 | End: 2024-03-20 | Stop reason: HOSPADM

## 2024-03-19 RX ORDER — OXYCODONE HYDROCHLORIDE 5 MG/1
5 TABLET ORAL
Status: COMPLETED | OUTPATIENT
Start: 2024-03-19 | End: 2024-03-19

## 2024-03-19 RX ORDER — ACETAMINOPHEN 325 MG/1
975 TABLET ORAL ONCE
Status: DISCONTINUED | OUTPATIENT
Start: 2024-03-19 | End: 2024-03-20 | Stop reason: HOSPADM

## 2024-03-19 RX ORDER — BUPIVACAINE HYDROCHLORIDE AND EPINEPHRINE 5; 5 MG/ML; UG/ML
INJECTION, SOLUTION PERINEURAL PRN
Status: DISCONTINUED | OUTPATIENT
Start: 2024-03-19 | End: 2024-03-19 | Stop reason: HOSPADM

## 2024-03-19 RX ADMIN — ONDANSETRON 4 MG: 2 INJECTION INTRAMUSCULAR; INTRAVENOUS at 09:16

## 2024-03-19 RX ADMIN — Medication 50 MG: at 09:11

## 2024-03-19 RX ADMIN — FENTANYL CITRATE 25 MCG: 50 INJECTION, SOLUTION INTRAMUSCULAR; INTRAVENOUS at 09:58

## 2024-03-19 RX ADMIN — DEXAMETHASONE SODIUM PHOSPHATE 4 MG: 4 INJECTION, SOLUTION INTRA-ARTICULAR; INTRALESIONAL; INTRAMUSCULAR; INTRAVENOUS; SOFT TISSUE at 09:16

## 2024-03-19 RX ADMIN — FENTANYL CITRATE 50 MCG: 50 INJECTION, SOLUTION INTRAMUSCULAR; INTRAVENOUS at 09:10

## 2024-03-19 RX ADMIN — LIDOCAINE HYDROCHLORIDE 100 MG: 20 INJECTION, SOLUTION INFILTRATION; PERINEURAL at 09:11

## 2024-03-19 RX ADMIN — SODIUM CHLORIDE, SODIUM LACTATE, POTASSIUM CHLORIDE, CALCIUM CHLORIDE: 600; 310; 30; 20 INJECTION, SOLUTION INTRAVENOUS at 08:52

## 2024-03-19 RX ADMIN — OXYCODONE HYDROCHLORIDE 5 MG: 5 TABLET ORAL at 10:01

## 2024-03-19 RX ADMIN — FENTANYL CITRATE 25 MCG: 50 INJECTION, SOLUTION INTRAMUSCULAR; INTRAVENOUS at 09:53

## 2024-03-19 RX ADMIN — KETOROLAC TROMETHAMINE 30 MG: 30 INJECTION, SOLUTION INTRAMUSCULAR; INTRAVENOUS at 09:30

## 2024-03-19 RX ADMIN — PROPOFOL 200 MG: 10 INJECTION, EMULSION INTRAVENOUS at 09:11

## 2024-03-19 RX ADMIN — PROPOFOL 150 MCG/KG/MIN: 10 INJECTION, EMULSION INTRAVENOUS at 09:11

## 2024-03-19 NOTE — ANESTHESIA PROCEDURE NOTES
Airway       Patient location during procedure: OR       Procedure Start/Stop Times: 3/19/2024 9:13 AM  Staff -        Performed By: CRNAIndications and Patient Condition       Indications for airway management: viraj-procedural       Induction type:intravenous       Mask difficulty assessment: 1 - vent by mask    Final Airway Details       Final airway type: endotracheal airway       Successful airway: ETT - single  Endotracheal Airway Details        ETT size (mm): 7.0       Cuffed: yes       Successful intubation technique: direct laryngoscopy       DL Blade Type: MAC 3       Grade View of Cords: 1       Adjucts: stylet       Position: Right       Secured at (cm): 22    Post intubation assessment        Placement verified by: capnometry, equal breath sounds and chest rise        Number of attempts at approach: 1       Secured with: tape       Ease of procedure: easy       Dentition: Intact and Unchanged    Medication(s) Administered   Medication Administration Time: 3/19/2024 9:13 AM

## 2024-03-19 NOTE — ANESTHESIA CARE TRANSFER NOTE
Patient: Lillie Sánchez    Procedure: Procedure(s):  Laparoscopic Bilateral Tubal Ligation       Diagnosis: Consultation for sterilization [Z30.09]  Diagnosis Additional Information: No value filed.    Anesthesia Type:   General     Note:      Level of Consciousness: drowsy  Oxygen Supplementation: face mask  Level of Supplemental Oxygen (L/min / FiO2): 5  Independent Airway: airway patency satisfactory and stable  Dentition: dentition unchanged  Vital Signs Stable: post-procedure vital signs reviewed and stable  Report to RN Given: handoff report given  Patient transferred to: PACU    Handoff Report: Identifed the Patient, Identified the Reponsible Provider, Reviewed the pertinent medical history, Discussed the surgical course, Reviewed Intra-OP anesthesia mangement and issues during anesthesia, Set expectations for post-procedure period and Allowed opportunity for questions and acknowledgement of understanding      Vitals:  Vitals Value Taken Time   /67 03/19/24 0945   Temp     Pulse 95 03/19/24 0947   Resp 13 03/19/24 0947   SpO2 100 % 03/19/24 0947   Vitals shown include unfiled device data.    Electronically Signed By: HARDIK Casanova CRNA  March 19, 2024  9:48 AM

## 2024-03-19 NOTE — ANESTHESIA POSTPROCEDURE EVALUATION
Patient: Lillie Sánchez    Procedure: Procedure(s):  Laparoscopic Bilateral Tubal Ligation       Anesthesia Type:  General    Note:  Disposition: Outpatient   Postop Pain Control: Uneventful            Sign Out: Well controlled pain   PONV: No   Neuro/Psych: Uneventful            Sign Out: Acceptable/Baseline neuro status   Airway/Respiratory: Uneventful            Sign Out: Acceptable/Baseline resp. status   CV/Hemodynamics: Uneventful            Sign Out: Acceptable CV status; No obvious hypovolemia; No obvious fluid overload   Other NRE: NONE   DID A NON-ROUTINE EVENT OCCUR?            Last vitals:  Vitals Value Taken Time   /67 03/19/24 0945   Temp 97.7  F (36.5  C) 03/19/24 0945   Pulse 89 03/19/24 1000   Resp 13 03/19/24 1000   SpO2 100 % 03/19/24 1000   Vitals shown include unfiled device data.    Electronically Signed By: Irineo Warren MD  March 19, 2024  10:01 AM

## 2024-03-19 NOTE — OP NOTE
Pre-operative DX: Desires permanent sterilization  Post-operative DX: Same    Procedure: Laparoscopic Bilateral Tubal Ligation  Surgeon: Terrell Torres  Assistants: [facility] staff  Anesthesia: General    Findings: Normal uterus, tubes and ovaries bilaterally     Procedure:  The patient was taken to the OR and anesthesia was induced.  She was then placed in dorsal lithotomy position and prepped and draped in a sterile fashion.    A sponge stick was placed vaginally for uterine manipulation.  An incision was made in the umbilicus.  A 5 mm trocar and scope were placed using direct visualization.  Pneumoperitoneum was achieved.  The patient was placed in trendelenburg and the bowel was swept cephalad.  Through a separate stab incision 2 cm above the symphysis pubis, a 5 mm trocar and sleeve were placed under laparoscopic visualization.  The uterus, ovaries, and tubes ( out to their fimbriated ends) were visualized.  The tubes were grasped 2 cm from the cornu first on the left and then on the right and cauterized in 3 places.   Placement across the tubes was verified.  The remainder of the pelvis appears normal.  Good hemostasis was noted.  The pneumoperitoneum was then released and the trocar sleeves removed.  The skin incisions were closed in an interrupted fashion using 4-0 Vicryl suture.  The incisions were infiltrated with 0.5% marcaine with Epi  The vaginal sponge was removed.  Sponge and needle counts were correct X 2  The patient was taken to PACU in stable condition.    EBL: 5cc  Pathology: None  Complications:  none  Implants:  none

## 2024-03-19 NOTE — DISCHARGE INSTRUCTIONS
Gynecology Outpatient Post-operative Instructions    1.Dressing (if present) may be removed tomorrow.  Leave incision uncovered.    2.You may shower as normal tomorrow.    3. You may eat as tolerated today.    4. You may take ibuprofen over the counter as tolerated.    5. Tomorrow, lifting and physical activity as tolerated.    6.  You may drive when you are no longer requiring narcotic pain medication.    7. You may return to work/school when you feel able.    8.  Please contact my office with any problems.    9.  I would like to touch base in 2 weeks.  Please either send me a [employer] message or call our office and let us know how you are doing.

## 2024-05-02 ENCOUNTER — OFFICE VISIT (OUTPATIENT)
Dept: RHEUMATOLOGY | Facility: CLINIC | Age: 39
End: 2024-05-02
Payer: COMMERCIAL

## 2024-05-02 VITALS
BODY MASS INDEX: 21.29 KG/M2 | HEART RATE: 104 BPM | DIASTOLIC BLOOD PRESSURE: 77 MMHG | WEIGHT: 138 LBS | OXYGEN SATURATION: 100 % | SYSTOLIC BLOOD PRESSURE: 109 MMHG

## 2024-05-02 DIAGNOSIS — M25.561 PAIN AND SWELLING OF RIGHT KNEE: ICD-10-CM

## 2024-05-02 DIAGNOSIS — M25.461 PAIN AND SWELLING OF RIGHT KNEE: ICD-10-CM

## 2024-05-02 DIAGNOSIS — M25.462 PAIN AND SWELLING OF LEFT KNEE: ICD-10-CM

## 2024-05-02 DIAGNOSIS — M25.562 PAIN AND SWELLING OF LEFT KNEE: ICD-10-CM

## 2024-05-02 DIAGNOSIS — Z79.899 HIGH RISK MEDICATION USE: ICD-10-CM

## 2024-05-02 DIAGNOSIS — M05.9 SEROPOSITIVE RHEUMATOID ARTHRITIS (H): Primary | ICD-10-CM

## 2024-05-02 LAB
APPEARANCE FLD: ABNORMAL
CELL COUNT BODY FLUID SOURCE: ABNORMAL
COLOR FLD: ABNORMAL
CRYSTALS SNV MICRO: NORMAL
LYMPHOCYTES NFR FLD MANUAL: 19 %
MONOS+MACROS NFR FLD MANUAL: 17 %
NEUTS BAND NFR FLD MANUAL: 65 %
WBC # FLD AUTO: ABNORMAL /UL

## 2024-05-02 PROCEDURE — 87075 CULTR BACTERIA EXCEPT BLOOD: CPT | Performed by: INTERNAL MEDICINE

## 2024-05-02 PROCEDURE — 89051 BODY FLUID CELL COUNT: CPT | Performed by: INTERNAL MEDICINE

## 2024-05-02 PROCEDURE — 20610 DRAIN/INJ JOINT/BURSA W/O US: CPT | Mod: 50 | Performed by: INTERNAL MEDICINE

## 2024-05-02 PROCEDURE — 89060 EXAM SYNOVIAL FLUID CRYSTALS: CPT | Performed by: INTERNAL MEDICINE

## 2024-05-02 PROCEDURE — 87205 SMEAR GRAM STAIN: CPT | Performed by: INTERNAL MEDICINE

## 2024-05-02 PROCEDURE — 99215 OFFICE O/P EST HI 40 MIN: CPT | Mod: 25 | Performed by: INTERNAL MEDICINE

## 2024-05-02 PROCEDURE — 36415 COLL VENOUS BLD VENIPUNCTURE: CPT | Performed by: INTERNAL MEDICINE

## 2024-05-02 PROCEDURE — 84702 CHORIONIC GONADOTROPIN TEST: CPT | Performed by: INTERNAL MEDICINE

## 2024-05-02 PROCEDURE — 87070 CULTURE OTHR SPECIMN AEROBIC: CPT | Mod: 59 | Performed by: INTERNAL MEDICINE

## 2024-05-02 RX ORDER — TRIAMCINOLONE ACETONIDE 40 MG/ML
40 INJECTION, SUSPENSION INTRA-ARTICULAR; INTRAMUSCULAR ONCE
Status: COMPLETED | OUTPATIENT
Start: 2024-05-02 | End: 2024-05-02

## 2024-05-02 RX ORDER — LEFLUNOMIDE 20 MG/1
20 TABLET ORAL DAILY
Qty: 30 TABLET | Refills: 3 | Status: SHIPPED | OUTPATIENT
Start: 2024-05-02

## 2024-05-02 RX ORDER — ABATACEPT 125 MG/ML
125 INJECTION, SOLUTION SUBCUTANEOUS
Qty: 4 ML | Refills: 4 | Status: SHIPPED | OUTPATIENT
Start: 2024-05-02

## 2024-05-02 RX ADMIN — TRIAMCINOLONE ACETONIDE 40 MG: 40 INJECTION, SUSPENSION INTRA-ARTICULAR; INTRAMUSCULAR at 15:38

## 2024-05-02 NOTE — NURSING NOTE
RAPID3 (0-30) Cumulative Score  18          RAPID3 Weighted Score (divide #4 by 3 and that is the weighted score)  6

## 2024-05-02 NOTE — PROGRESS NOTES
Rheumatology Clinic Visit      Lillie Sánchez MRN# [id number]   YOB: 1985 Age: 38 year old      Date of visit: 5/02/24   PCP: Kehr, Kristen M    Chief Complaint   Patient presents with:  RECHECK: RA    Assessment and Plan     1.  Seropositive rheumatoid arthritis (RF negative, CCP >340): Reportedly diagnosed with rheumatoid arthritis in 2019.  Previously followed by Dr. Murphy at an outside clinic.  Establish care with me on 4/25/2022.  Previously on Humira (ineffective), Enbrel (ineffective), methotrexate (felt more depressed, intermittent rashes that she associates with methotrexate), hydroxychloroquine, sulfasalazine.  I first evaluated on 4/25/2022 at which point she had severe inflammatory arthritis affecting the knees and wrists, and intermittent symptoms at the sternum; at that appointment methotrexate dose was increased, hydroxychloroquine dose was increased, and sulfasalazine was started; she was then lost to follow-up, later presenting on 10/24/2022 where she was significantly improved but found to have inflammatory effusion of the knee that was injected with steroids.  She was then lost to follow-up again, presenting on 1/11/2024 reporting that she stopped all treatments in 2022 after the last appointment because she associated a rash and depression symptoms with methotrexate, and states that she feels better arthritis symptoms worsened since stopping medications.  1/11/2024: Synovitis of the L>R wrists and knees.  Reviewed the diagnosis of rheumatoid arthritis, discussing both the articular and extra-articular manifestations of the disease, and the treatment options.  She prefers to avoid methotrexate because of the associated depressed feeling and rashes.  Hydroxychloroquine and sulfasalazine were partially effective previously but then she was lost to follow-up.  In January 2024 a course of prednisone was used that improve the knee pain and resolved the sternal pain that she had;  Orencia has been minimally beneficial but the sternal pain has not returned.  Since last seen she had tubal ligation for birth control.  Discussed additional treatment options.  Add leflunomide.  If needed in the future consider adding sulfasalazine and hydroxychloroquine and/or changing a biologic DMARD.  Arthrocentesis and steroid injection of each knee today as documented in the procedure section.   Chronic illness, progressive, severe  - Continue  Orencia 125 mg SQ every 7 days  - Start leflunomide 20 mg daily  - Labs today: CBC, Creatinine, Hepatic Panel, ESR, CRP, serum beta-hCG  - Labs monthly [id number]: CBC, Cr, Hepatic Panel  - Labs in 3 months: CBC, Creatinine, Hepatic Panel, ESR, CRP    High risk medication requiring intensive toxicity monitoring at least quarterly. nd surface antigen, QuantiFERON-TB gold plus              Rapid 3, cumulative scores                      05/02/2024: 18 (Orencia)                       01/11/2024: 21 (no treatment)    # Leflunomide (Arava) Risks and Benefits: The risks and benefits of leflunomide were discussed in detail and the patient verbalized understanding.  The risks discussed include, but are not limited to, the risk for hypersensitivity, anaphylaxis, anaphylactoid reactions, hepatotoxicity, infections, interstitial lung disease, alopecia, rash, nausea, and diarrhea.  The impact on malignancies is not fully defined.   Alcohol should be avoided while taking leflunomide.  Pregnancy prevention and planning was discussed; it is recommended that women of childbearing potential use reliable contraception before, during, and for a period of 2 years after treatment with leflunomide; if pregnancy is planned within 2 years of discontinuing medication then women should undergo drug elimination procedures (i.e. cholestyramine). Routine laboratory monitoring is required during leflunomide therapy. I encouraged reviewing the package insert and asking any questions about the  medication.      # Pregnancy plans: History of tubal ligation    2.  Bilateral knee pain and swelling: Likely RA related.  Arthrocentesis and steroid injection as documented in the procedure section.    3.  Vaccinations: Vaccinations reviewed with Ms. Sánchez.  Risks and benefits of vaccinations were discussed.    - Influenza: encouraged yearly vaccination  - [id number]: refused by patient  - [id number]: refused by patient  - COVID19: Refused by patient.      Total minutes spent in evaluation with patient, documentation, , and review of pertinent studies and chart notes: 28  The longitudinal plan of care for the rheumatology problem(s) were addressed during this visit.  Due to added complexity of care, we will continue to support the patient and the subsequent management of this condition with ongoing continuity of care.       Ms. Sánchez verbalized agreement with and understanding of the rational for the diagnosis and treatment plan.  All questions were answered to best of my ability and the patient's satisfaction. Ms. Sánchez was advised to contact the clinic with any questions that may arise after the clinic visit.      Thank you for involving me in the care of the patient    Return to clinic: 3 months    HPI   Lillie Katya Sánchez is a 38 year old female with a past medical history significant for depression, tobacco use, migraines, hypothyroidism, and rheumatoid arthritis who presents for follow-up of rheumatoid arthritis.     6/3/2021 [facility] rheumatology note by Dr. Ronny Murphy documents rheumatoid arthritis.  On methotrexate and Enbrel.  Enbrel was started in February.  Humira was used previously but symptoms are returning 1 week prior to next dose.  Skin rash developed so it was recommended to stop TNF inhibitors and she was to see dermatology for evaluation of the rash.  Methotrexate was changed from oral to subcutaneous.  Methotrexate changed to 0.7 mL  injections.  Prednisone was started at 15 mg via 15 mg per 5 mL solution     4/25/2022: Lillie reports being diagnosed with rheumatoid arthritis in 2019.  Currently with terrible joint pain involving the knees, wrists, and sternum.  Currently on prednisone 2mL of the 15mg/5mL solution of methylprednisolone, methotrexate 17.5mg PO wkly (says that SQ was not more effective in the past, and has not been on a higher dose of methotrexate), hydroxychloroquine 200mg daily (follows with [facility] in [facility], with last eye exam in late 2020 or early 2021; planning to have a repeat eye exam soon).  2 weeks ago had a ruptured Baker's cyst on the right knee; was seen by orthopedics where fluid was removed from both knees and both knees were injected with some improvement.  Still with occasional burning pain going down the right calf.  Did not find Humira or Enbrel to be effective.    8/1/2022: no show to scheduled appointment.     10/24/2022: fell over her dog and hurt the left wrist 2 weeks ago; went to see sports medicine where a steroid injection of the left wrist was given without much improvement.  Has bilateral knee pain that is worse with activity; feels like she has a left Baker's cyst.  Other joints are doing well.  Does not feel like the medication changes made in April 2022 were helpful.    11/7/2022: No-show to scheduled appointment    1/11/2024: Today she reports that she stopped methotrexate, sulfasalazine, and hydroxychloroquine shortly after the last rheumatology visit in 2022.  She associated methotrexate with a rash that occurred intermittently and depression symptoms.  After stopping all of her medications the rash resolved and depression symptoms resolved.  Arthritis worsened.  Limited range of motion of the left wrist, similar to previous.  Mild swelling and pain of both wrist.  Swelling and pain of both knees.  Knee pain is worse in the morning and improves with time and activity.  Planning for  tubal ligation, that she says typically occurs within 30 days after seeing OB/GYN and she has an OB/GYN appointment on 2/15/2024    Today, 5/2/2024: The previous prednisone course was effective at alleviating the sternum pain and that has not returned.  She also had mild improvement of the knee pain and left wrist pain.  However, Orencia has only provided minimal benefit with regard to arthritis at the knees and wrists.  Swelling and severe pain of the knees that affects ambulation.  Also pain at the left wrist that is mildly improved but still with swelling and pain that is worse in the morning and improves with time and activity.  Knee pain is worse in the morning and improves with time and activity, but only minimal improvement.    Denies fevers, chills, nausea, vomiting, constipation, diarrhea. No abdominal pain.  No black or bloody stools. No chest pain/pressure, palpitations, or shortness of breath. No LE swelling. No neck pain. No oral or nasal sores.  No rash. No sicca symptoms.     Tobacco: Former smoker  EtOH: Rarely  Drugs: None    ROS   12 point review of system was completed and negative except as noted in the HPI     Active Problem List     Patient Active Problem List   Diagnosis    Hypothyroidism    CARDIOVASCULAR SCREENING; LDL GOAL LESS THAN 160    Migraine    Right shoulder pain    Tobacco abuse    Menorrhagia    Other specified hypothyroidism    Mild major depression (H24)    Rheumatoid arthritis (H)    Consultation for sterilization     Past Medical History     Past Medical History:   Diagnosis Date    NO ACTIVE PROBLEMS     Rheumatoid arthritis (H) 2/23/2021    Thyroid disease      Past Surgical History     Past Surgical History:   Procedure Laterality Date    LAPAROSCOPIC TUBAL LIGATION Bilateral 3/19/2024    Procedure: Laparoscopic Bilateral Tubal Ligation;  Surgeon: Terrell Torres MD;  Location:  OR    NO HISTORY OF SURGERY       Allergy     Allergies   Allergen Reactions    Vicodin  "[Hydrocodone-Acetaminophen]      nausea     Current Medication List     Current Outpatient Medications   Medication Sig Dispense Refill    Abatacept (ORENCIA CLICKJECT) 125 MG/ML SOAJ auto-injector Inject 1 mL (125 mg) Subcutaneous every 7 days .  Hold for infection and seek medical attention. 4 mL 4    levothyroxine (SYNTHROID/LEVOTHROID) 137 MCG tablet Take 1 tablet (137 mcg) by mouth daily 90 tablet 3    oxyCODONE (ROXICODONE) 5 MG tablet Take 1-2 tablets (5-10 mg) by mouth every 4 hours as needed for moderate to severe pain (Patient not taking: Reported on 5/2/2024) 6 tablet 0     Current Facility-Administered Medications   Medication Dose Route Frequency Provider Last Rate Last Admin    betamethasone acet & sod phos (CELESTONE) injection 6 mg  6 mg   Arash Slaughter MD   6 mg at 10/13/22 1702    ropivacaine (NAROPIN) injection 2 mL  2 mL   Arash Slaughter MD   2 mL at 10/13/22 1702         Social History   See HPI    Family History     Family History   Problem Relation Age of Onset    Breast Cancer Other 50        2 aunts    Mental Illness Other     Depression Sister     Depression Mother     Thyroid Disease Mother         multiple maternal relatives with hypothyroidism    Depression Maternal Grandmother     Thyroid Disease Maternal Grandmother      Physical Exam     Temp Readings from Last 3 Encounters:   03/19/24 97.7  F (36.5  C) (Temporal)   03/13/24 98.1  F (36.7  C) (Tympanic)   09/08/22 97.6  F (36.4  C) (Tympanic)     BP Readings from Last 5 Encounters:   05/02/24 109/77   03/19/24 113/71   03/13/24 116/82   02/15/24 108/74   01/11/24 116/85     Pulse Readings from Last 1 Encounters:   05/02/24 104     Resp Readings from Last 1 Encounters:   03/19/24 16     Estimated body mass index is 21.29 kg/m  as calculated from the following:    Height as of 3/13/24: 1.715 m (5' 7.5\").    Weight as of this encounter: 62.6 kg (138 lb).    GEN: NAD.   HEENT:  Anicteric, noninjected sclera. No obvious external " lesions of the ear and nose. Hearing intact.  CV: S1, S2. RRR. No m/r/g  PULM: No increased work of breathing. CTA bilaterally   MSK: MCPs, PIPs, DIPs without swelling or tenderness to palpation.  Left wrist with mild synovial swelling and tenderness to palpation; limited range of motion.  Right wrist without swelling or tenderness to palpation.    Elbows and shoulders without swelling or tenderness to palpation.  Both knees with synovial swelling and tenderness to palpation, increased warmth bilaterally, moderate effusion, no overlying erythema, and diffusely tender to palpation.  Ankles without swelling or tenderness to palpation.  MTPs without swelling or tenderness to palpation.  SKIN: No rash or jaundice seen  PSYCH: Alert. Appropriate.     Labs / Imaging (select studies)     RF/CCP  Recent Labs   Lab Test 01/31/20  1505 10/03/19  1228   CCPIGG >340*  --    RHF  --  <20     CBC  Recent Labs   Lab Test 01/11/24  1538 10/24/22  1456 06/20/22  1107 01/31/20  1505 10/03/19  1228   WBC 7.9 7.1 9.4   < > 8.0   RBC 4.75 4.60 4.58   < > 4.79   HGB 12.1 13.4 13.0   < > 14.1   HCT 37.7 40.7 39.8   < > 42.3   MCV 79 89 87   < > 88   RDW 14.7 14.4 14.0   < > 13.1   * 342 431   < > 320   MCH 25.5* 29.1 28.4   < > 29.4   MCHC 32.1 32.9 32.7   < > 33.3   NEUTROPHIL 64 53 66   < > 58.6   LYMPH 28 33 27   < > 31.4   MONOCYTE 6 12 6   < > 6.0   EOSINOPHIL 1 1 1   < > 3.0   BASOPHIL 1 1 1   < > 1.0   ANEU  --   --   --   --  4.7   ALYM  --   --   --   --  2.5   DONOAVN  --   --   --   --  0.5   AEOS  --   --   --   --  0.2   ABAS  --   --   --   --  0.1   ANEUTAUTO 5.0 3.7 6.2   < >  --    ALYMPAUTO 2.2 2.4 2.5   < >  --    AMONOAUTO 0.5 0.9 0.6   < >  --    AEOSAUTO 0.1 0.1 0.1   < >  --    ABSBASO 0.1 0.1 0.1   < >  --     < > = values in this interval not displayed.     CMP  Recent Labs   Lab Test 01/11/24  1538 10/24/22  1456 06/20/22  1107 05/23/22  1131 04/25/22  1245 04/25/22  1245 03/04/21  1440 06/16/20  1401  01/31/20  1505 10/03/19  1228   NA  --   --   --   --   --   --   --   --   --  140   POTASSIUM  --   --   --   --   --   --   --   --   --  4.0   CHLORIDE  --   --   --   --   --   --   --   --   --  107   CO2  --   --   --   --   --   --   --   --   --  28   ANIONGAP  --   --   --   --   --   --   --   --   --  5   GLC  --   --   --  99  --  82  --   --   --  77   BUN  --   --   --   --   --   --   --   --   --  13   CR 0.69 0.90 0.78 0.83   < > 0.74 0.86 0.82 0.59 0.78   GFRESTIMATED >90 85 >90 >90   < > >90 88 >90 >90 >90   GFRESTBLACK  --   --   --   --   --   --  >90 >90 >90 >90   BHAVIN  --   --   --   --   --   --   --   --   --  9.1   BILITOTAL 0.3 0.3 0.3 0.4   < > 0.3  --   --   --  0.3   ALBUMIN 4.2 3.5 3.4 3.4   < > 3.5  --   --   --  4.0   PROTTOTAL 7.9 7.2 7.3 7.5   < > 7.7  --   --   --  7.6   ALKPHOS 64 49 54 52   < > 76  --   --   --  54   AST 19 32 12 19   < > 17 16 14 13 23   ALT 7 26 16 21   < > 23 42 27 31 39    < > = values in this interval not displayed.     Calcium/VitaminD  Recent Labs   Lab Test 10/03/19  1228   BHAVIN 9.1     ESR/CRP  Recent Labs   Lab Test 01/11/24  1538 10/24/22  1456 05/23/22  1131 04/25/22  1245   SED 33* 10 16 10   CRP  --  27.4* 40.8* 32.9*   CRPI 29.90*  --   --   --      Hepatitis B  Recent Labs   Lab Test 01/11/24  1538 04/25/22  1245   HBCAB Nonreactive Nonreactive   HEPBANG Nonreactive Nonreactive     Hepatitis C  Recent Labs   Lab Test 01/11/24  1538 04/25/22  1245   HCVAB Nonreactive Nonreactive     Lyme ab screening  Recent Labs   Lab Test 10/30/19  1232   LYMEGM 0.17     Tuberculosis Screening  Recent Labs   Lab Test 01/11/24  1538 10/24/22  1456 06/16/20  1401   TBRES Negative Negative Negative     Immunization History     Immunization History   Administered Date(s) Administered    HPV 08/17/2010, 10/18/2010, 10/12/2011    HPV Quadrivalent 08/17/2010, 10/18/2010    Influenza (IIV3) PF 10/12/2011    TD,PF 7+ (Tenivac) 08/06/2009    TDAP (Adacel,Boostrix)  02/29/2016    TDAP Vaccine (Adacel) 10/12/2011     Procedure     Procedure: Arthrocentesis and steroid injection of the right knee  Indication: Pain and swelling of the right knee, rheumatoid arthritis    The procedure was explained in detail. Risks including infection, pain, structural damage such as cartilage damage and tendon rupture, and medication reaction was explained. The option of not doing the procedure was also provided. All questions were answered and the patient consented to the procedure.     A time-out was performed and the correct patient, procedure, and laterality were verified.  The right knee was examined and location for injection was identified. The area was cleaned with chlorhexidine, twice.  Ethyl chloride was then used for topical anaesthetic. Then, lidocaine 1% 3mL was injected subcutaneously and and deeper tissue for local anaesthetic. Then using a 3.5 inch 18 gauge needle, fluid was aspirated from the joint. Then without removing the needle from the joint space, syringes were changed and a mixture of lidocaine 1% 2 mL and Kenalog 40mg was injected into the intra-articular space.     The patient tolerated the procedure well. No complications.    Fluid collected:  Appearance: Cloudy yellow, nonviscous  Volume: 40 mL    Fluid was sent to the lab for crystal analysis, cell count, culture, and Gram stain    1% Lidocaine  : Hospira  Lot #: [id number]  EXPIRATION DATE: 01 MAR 2025  NDC: 7381-2917-83    MEDICATION: Triamcinolone 40 mg  : [employer]  LOT #: [id number]  EXPIRATION DATE:  2025-11-30  NDC#: [address]-0    Procedure     Procedure: Arthrocentesis and steroid injection of the left knee  Indication: Pain and swelling of the left knee, rheumatoid arthritis    The procedure was explained in detail. Risks including infection, pain, structural damage such as cartilage damage and tendon rupture, and medication reaction was explained. The option of not doing the  procedure was also provided. All questions were answered and the patient consented to the procedure.     A time-out was performed and the correct patient, procedure, and laterality were verified.    The left knee was examined and location for injection was identified. The area was cleaned with chlorhexidine, twice.  Ethyl chloride was then used for topical anaesthetic. Then, lidocaine 1% 3mL was injected subcutaneously and and deeper tissue for local anaesthetic. Then using a 3.5 inch 18 gauge needle, fluid was aspirated from the joint.  Note that cloudy yellow fluid was being aspirated initially and then there was some streaks of blood, likely from the procedure itself.  Then without removing the needle from the joint space, syringes were changed and a mixture of lidocaine 1% 2 mL and Kenalog 40mg was injected into the intra-articular space.     The patient tolerated the procedure well. No complications.    Fluid collected:  Appearance: Cloudy yellow, some blood, nonviscous  Volume: 35 mL    Fluid was sent to the lab for crystal analysis, cell count, culture, and Gram stain    1% Lidocaine  : Hospira  Lot #: [id number]  EXPIRATION DATE: 01 MAR 2025  NDC: 0011-4090-98    MEDICATION: Triamcinolone 40 mg  : [employer]  LOT #: [id number]  EXPIRATION DATE:  2025-11-30  NDC#: [address]-3         Chart documentation done in part with Dragon Voice recognition Software. Although reviewed after completion, some word and grammatical error may remain.    Tanner Cintron MD

## 2024-05-04 LAB — HCG INTACT+B SERPL-ACNC: <1 MIU/ML

## 2024-05-07 LAB
BACTERIA SNV CULT: NO GROWTH
BACTERIA SNV CULT: NO GROWTH
GRAM STAIN RESULT: NORMAL

## 2024-05-16 LAB
BACTERIA SNV CULT: NORMAL
BACTERIA SNV CULT: NORMAL

## 2024-05-30 ENCOUNTER — LAB (OUTPATIENT)
Dept: LAB | Facility: CLINIC | Age: 39
End: 2024-05-30
Payer: COMMERCIAL

## 2024-05-30 DIAGNOSIS — M05.9 SEROPOSITIVE RHEUMATOID ARTHRITIS (H): ICD-10-CM

## 2024-05-30 DIAGNOSIS — Z79.899 HIGH RISK MEDICATION USE: ICD-10-CM

## 2024-05-30 LAB
ALBUMIN SERPL BCG-MCNC: 4.5 G/DL (ref 3.5–5.2)
ALP SERPL-CCNC: 49 U/L (ref 40–150)
ALT SERPL W P-5'-P-CCNC: 12 U/L (ref 0–50)
AST SERPL W P-5'-P-CCNC: 18 U/L (ref 0–45)
BASOPHILS # BLD AUTO: 0.1 10E3/UL (ref 0–0.2)
BASOPHILS NFR BLD AUTO: 1 %
BILIRUB DIRECT SERPL-MCNC: <0.2 MG/DL (ref 0–0.3)
BILIRUB SERPL-MCNC: 0.3 MG/DL
CREAT SERPL-MCNC: 0.71 MG/DL (ref 0.51–0.95)
CRP SERPL-MCNC: <3 MG/L
EGFRCR SERPLBLD CKD-EPI 2021: >90 ML/MIN/1.73M2
EOSINOPHIL # BLD AUTO: 0.1 10E3/UL (ref 0–0.7)
EOSINOPHIL NFR BLD AUTO: 1 %
ERYTHROCYTE [DISTWIDTH] IN BLOOD BY AUTOMATED COUNT: 15.4 % (ref 10–15)
ERYTHROCYTE [SEDIMENTATION RATE] IN BLOOD BY WESTERGREN METHOD: 5 MM/HR (ref 0–20)
HCT VFR BLD AUTO: 42.6 % (ref 35–47)
HGB BLD-MCNC: 14 G/DL (ref 11.7–15.7)
IMM GRANULOCYTES # BLD: 0 10E3/UL
IMM GRANULOCYTES NFR BLD: 0 %
LYMPHOCYTES # BLD AUTO: 2.2 10E3/UL (ref 0.8–5.3)
LYMPHOCYTES NFR BLD AUTO: 36 %
MCH RBC QN AUTO: 27.6 PG (ref 26.5–33)
MCHC RBC AUTO-ENTMCNC: 32.9 G/DL (ref 31.5–36.5)
MCV RBC AUTO: 84 FL (ref 78–100)
MONOCYTES # BLD AUTO: 0.4 10E3/UL (ref 0–1.3)
MONOCYTES NFR BLD AUTO: 7 %
NEUTROPHILS # BLD AUTO: 3.4 10E3/UL (ref 1.6–8.3)
NEUTROPHILS NFR BLD AUTO: 55 %
PLATELET # BLD AUTO: 284 10E3/UL (ref 150–450)
PROT SERPL-MCNC: 7 G/DL (ref 6.4–8.3)
RBC # BLD AUTO: 5.08 10E6/UL (ref 3.8–5.2)
WBC # BLD AUTO: 6.2 10E3/UL (ref 4–11)

## 2024-05-30 PROCEDURE — 85652 RBC SED RATE AUTOMATED: CPT

## 2024-05-30 PROCEDURE — 86140 C-REACTIVE PROTEIN: CPT

## 2024-05-30 PROCEDURE — 82565 ASSAY OF CREATININE: CPT

## 2024-05-30 PROCEDURE — 36415 COLL VENOUS BLD VENIPUNCTURE: CPT

## 2024-05-30 PROCEDURE — 85025 COMPLETE CBC W/AUTO DIFF WBC: CPT

## 2024-05-30 PROCEDURE — 80076 HEPATIC FUNCTION PANEL: CPT

## 2024-07-15 ENCOUNTER — PATIENT OUTREACH (OUTPATIENT)
Dept: CARE COORDINATION | Facility: CLINIC | Age: 39
End: 2024-07-15
Payer: COMMERCIAL

## 2024-11-17 ENCOUNTER — HEALTH MAINTENANCE LETTER (OUTPATIENT)
Age: 39
End: 2024-11-17

## 2024-11-21 ENCOUNTER — OFFICE VISIT (OUTPATIENT)
Dept: RHEUMATOLOGY | Facility: CLINIC | Age: 39
End: 2024-11-21
Payer: COMMERCIAL

## 2024-11-21 VITALS
DIASTOLIC BLOOD PRESSURE: 72 MMHG | WEIGHT: 130.4 LBS | BODY MASS INDEX: 20.12 KG/M2 | SYSTOLIC BLOOD PRESSURE: 107 MMHG | RESPIRATION RATE: 16 BRPM | HEART RATE: 90 BPM | OXYGEN SATURATION: 100 %

## 2024-11-21 DIAGNOSIS — Z79.899 HIGH RISK MEDICATION USE: ICD-10-CM

## 2024-11-21 DIAGNOSIS — M25.561 PAIN AND SWELLING OF RIGHT KNEE: ICD-10-CM

## 2024-11-21 DIAGNOSIS — M25.461 PAIN AND SWELLING OF RIGHT KNEE: ICD-10-CM

## 2024-11-21 DIAGNOSIS — M05.9 SEROPOSITIVE RHEUMATOID ARTHRITIS (H): Primary | ICD-10-CM

## 2024-11-21 LAB
ALBUMIN SERPL BCG-MCNC: 4.2 G/DL (ref 3.5–5.2)
ALP SERPL-CCNC: 68 U/L (ref 40–150)
ALT SERPL W P-5'-P-CCNC: <5 U/L (ref 0–50)
APPEARANCE FLD: ABNORMAL
AST SERPL W P-5'-P-CCNC: 15 U/L (ref 0–45)
BASOPHILS # BLD AUTO: 0.1 10E3/UL (ref 0–0.2)
BASOPHILS NFR BLD AUTO: 1 %
BILIRUB DIRECT SERPL-MCNC: <0.2 MG/DL (ref 0–0.3)
BILIRUB SERPL-MCNC: 0.3 MG/DL
CELL COUNT BODY FLUID SOURCE: ABNORMAL
CHOLEST SERPL-MCNC: 174 MG/DL
COLOR FLD: YELLOW
CREAT SERPL-MCNC: 0.84 MG/DL (ref 0.51–0.95)
CRP SERPL-MCNC: 22.2 MG/L
CRYSTALS SNV MICRO: NORMAL
EGFRCR SERPLBLD CKD-EPI 2021: >90 ML/MIN/1.73M2
EOSINOPHIL # BLD AUTO: 0.2 10E3/UL (ref 0–0.7)
EOSINOPHIL NFR BLD AUTO: 2 %
ERYTHROCYTE [DISTWIDTH] IN BLOOD BY AUTOMATED COUNT: 12.1 % (ref 10–15)
ERYTHROCYTE [SEDIMENTATION RATE] IN BLOOD BY WESTERGREN METHOD: 20 MM/HR (ref 0–20)
FASTING STATUS PATIENT QL REPORTED: YES
GRAM STAIN RESULT: NORMAL
GRAM STAIN RESULT: NORMAL
HCT VFR BLD AUTO: 39.8 % (ref 35–47)
HDLC SERPL-MCNC: 34 MG/DL
HGB BLD-MCNC: 13.1 G/DL (ref 11.7–15.7)
IMM GRANULOCYTES # BLD: 0 10E3/UL
IMM GRANULOCYTES NFR BLD: 0 %
LDLC SERPL CALC-MCNC: 121 MG/DL
LYMPHOCYTES # BLD AUTO: 2.2 10E3/UL (ref 0.8–5.3)
LYMPHOCYTES NFR BLD AUTO: 23 %
LYMPHOCYTES NFR FLD MANUAL: 16 %
MCH RBC QN AUTO: 27.8 PG (ref 26.5–33)
MCHC RBC AUTO-ENTMCNC: 32.9 G/DL (ref 31.5–36.5)
MCV RBC AUTO: 85 FL (ref 78–100)
MONOCYTES # BLD AUTO: 0.5 10E3/UL (ref 0–1.3)
MONOCYTES NFR BLD AUTO: 6 %
MONOS+MACROS NFR FLD MANUAL: 4 %
NEUTROPHILS # BLD AUTO: 6.3 10E3/UL (ref 1.6–8.3)
NEUTROPHILS NFR BLD AUTO: 68 %
NEUTS BAND NFR FLD MANUAL: 80 %
NONHDLC SERPL-MCNC: 140 MG/DL
PLATELET # BLD AUTO: 457 10E3/UL (ref 150–450)
PROT SERPL-MCNC: 7.7 G/DL (ref 6.4–8.3)
RBC # BLD AUTO: 4.71 10E6/UL (ref 3.8–5.2)
TRIGL SERPL-MCNC: 95 MG/DL
WBC # BLD AUTO: 9.3 10E3/UL (ref 4–11)
WBC # FLD AUTO: ABNORMAL /UL

## 2024-11-21 RX ORDER — LEFLUNOMIDE 20 MG/1
20 TABLET ORAL DAILY
Qty: 30 TABLET | Refills: 2 | Status: SHIPPED | OUTPATIENT
Start: 2024-11-21

## 2024-11-21 RX ORDER — TRIAMCINOLONE ACETONIDE 40 MG/ML
40 INJECTION, SUSPENSION INTRA-ARTICULAR; INTRAMUSCULAR ONCE
Status: COMPLETED | OUTPATIENT
Start: 2024-11-21 | End: 2024-11-21

## 2024-11-21 RX ORDER — TOFACITINIB 11 MG/1
11 TABLET, FILM COATED, EXTENDED RELEASE ORAL DAILY
Qty: 30 TABLET | Refills: 2 | OUTPATIENT
Start: 2024-11-21

## 2024-11-21 RX ADMIN — TRIAMCINOLONE ACETONIDE 40 MG: 40 INJECTION, SUSPENSION INTRA-ARTICULAR; INTRAMUSCULAR at 08:47

## 2024-11-21 NOTE — PATIENT INSTRUCTIONS
RHEUMATOLOGY    [facility] [place]  [address] [place] [address]  [place] [address]    Phone number: [phone]  Fax number: [phone]    If you need a medication refill, please contact us as you may need lab work and/or a follow up visit prior to your refill.      Thank you for choosing [facility]!    Zoey Hernandez CMA Rheumatology

## 2024-11-21 NOTE — NURSING NOTE
RAPID3 (0-30) Cumulative Score  18.0          RAPID3 Weighted Score (divide #4 by 3 and that is the weighted score)  6.0

## 2024-11-21 NOTE — PROGRESS NOTES
Rheumatology Clinic Visit      Lillie Sánchez MRN# [id number]   YOB: 1985 Age: 39 year old      Date of visit: 11/21/24   PCP: Kehr, Kristen M    Chief Complaint   Patient presents with:  Rheumatoid Arthritis: Right knee is swelling and left wrist hurts    Assessment and Plan     1.  Seropositive rheumatoid arthritis (RF negative, CCP >340): Reportedly diagnosed with rheumatoid arthritis in 2019.  Previously followed by Dr. Murphy at an outside clinic.  Establish care with me on 4/25/2022.  Previously on Humira (ineffective), Enbrel (ineffective), methotrexate (felt more depressed, intermittent rashes that she associates with methotrexate), hydroxychloroquine, sulfasalazine, Orencia (partially effective; patient self-discontinued because of injection site pain).  I first evaluated on 4/25/2022 at which point she had severe inflammatory arthritis affecting the knees and wrists, and intermittent symptoms at the sternum; at that appointment methotrexate dose was increased, hydroxychloroquine dose was increased, and sulfasalazine was started; she was then lost to follow-up, later presenting on 10/24/2022 where she was significantly improved but found to have inflammatory effusion of the knee that was injected with steroids.  She was then lost to follow-up again, presenting on 1/11/2024 reporting that she stopped all treatments in 2022  because she associated a rash and depression symptoms with methotrexate, and states that she feels better arthritis symptoms worsened since stopping medications.  1/11/2024: Synovitis of the L>R wrists and knees.  Reviewed the diagnosis of rheumatoid arthritis, discussing both the articular and extra-articular manifestations of the disease, and the treatment options.  She prefers to avoid methotrexate because of the associated depressed feeling and rashes.  Hydroxychloroquine and sulfasalazine were partially effective previously but then she was lost to follow-up.  In  January 2024 a course of prednisone was used that improve the knee pain and resolved the sternal pain that she had; Orencia had been minimally beneficial but the sternal pain had not returned.  5/2/2024: Added leflunomide.  11/21/2024 add leflunomide in May 2024 but she failed to get labs; stressed the importance of toxicity monitoring labs and she verbalized understanding and agreement with getting future labs as instructed.  She discontinued Orencia at the same time that she started leflunomide in May 2024 because of injection site pain associated with Orencia; she would like to avoid self like injections if possible.  We discussed other treatment options.  Active disease now because she is not on treatment.  Leflunomide ran out about 1 month ago per patient.  Therefore, restart leflunomide and start Xeljanz.  Okay for Rinvoq if required by insurance; risks of each Xeljanz and Rinvoq were reviewed in detail today. Chronic illness, progressive, severe  - Remove from medication list as the patient has already discontinued: Orencia 125 mg SQ every 7 days  - Start Xeljanz XR 11 mg daily  - Restart leflunomide 20 mg daily  - Labs today: CBC, Creatinine, Hepatic Panel, ESR, CRP, lipid panel  - Labs in 8 weeks: CBC, Creatinine, Hepatic Panel, ESR, CRP, fasting lipid panel, QuantiFERON-TB gold plus    High risk medication requiring intensive toxicity monitoring at least quarterly. nd surface antigen, QuantiFERON-TB gold plus              Rapid 3, cumulative scores                      05/02/2024: 18 (Orencia)                       01/11/2024: 21 (no treatment)    # Tofacitinib (Xeljanz) Risks and Benefits: The risks and benefits of Xeljanz were discussed in detail and the patient verbalized understanding.  The risks discussed include, but are not limited to, the risk for hypersensitivity, anaphylaxis, anaphylactoid reactions, an increased risk for serious infections leading to hospitalization or death, increased risk for  lymphoma and other malignancies, an increased risk for gastrointestinal perforation, worsening triglycerides.  Higher cardiovascular risk and malignancy potential discussed.  Worse outcomes in setting such as COVID-19 infection discussed.  The most common adverse reactions were upper respiratory tract infections, headache, diarrhea, and nasopharyngitis.  In addition to routine laboratory monitoring during therapy, labs including CBC, CMP, and lipid panel will be required at the start of therapy and 8 weeks after starting therapy.  I encouraged reviewing the package insert and asking any questions about the medication.     # Pregnancy plans: History of tubal ligation    2.  Right knee pain and swelling: Likely RA related.  Arthrocentesis and steroid injection as documented in the procedure section.    3.  Vaccinations: Vaccinations reviewed with Ms. Sánchez.  Risks and benefits of vaccinations were discussed.    - Influenza: encouraged yearly vaccination  - Nuomepc62: Refused by patient  - COVID19: Refused by patient.  - Shingrix: Refused by patient    Total minutes spent in evaluation with patient, documentation, , and review of pertinent studies and chart notes: 34   The longitudinal plan of care for the rheumatology problem(s) were addressed during this visit.  Due to added complexity of care, we will continue to support the patient and the subsequent management of this condition with ongoing continuity of care.       Ms. Sánchez verbalized agreement with and understanding of the rational for the diagnosis and treatment plan.  All questions were answered to best of my ability and the patient's satisfaction. Ms. Sánchez was advised to contact the clinic with any questions that may arise after the clinic visit.      Thank you for involving me in the care of the patient    Return to clinic: 3 months    HPI   Lillie Sánchez is a 39 year old female with a past medical history significant for  depression, tobacco use, migraines, hypothyroidism, and rheumatoid arthritis who presents for follow-up of rheumatoid arthritis.     6/3/2021 [facility] rheumatology note by Dr. Ronny Murphy documents rheumatoid arthritis.  On methotrexate and Enbrel.  Enbrel was started in February.  Humira was used previously but symptoms are returning 1 week prior to next dose.  Skin rash developed so it was recommended to stop TNF inhibitors and she was to see dermatology for evaluation of the rash.  Methotrexate was changed from oral to subcutaneous.  Methotrexate changed to 0.7 mL injections.  Prednisone was started at 15 mg via 15 mg per 5 mL solution     4/25/2022: Lillie reports being diagnosed with rheumatoid arthritis in 2019.  Currently with terrible joint pain involving the knees, wrists, and sternum.  Currently on prednisone 2mL of the 15mg/5mL solution of methylprednisolone, methotrexate 17.5mg PO wkly (says that SQ was not more effective in the past, and has not been on a higher dose of methotrexate), hydroxychloroquine 200mg daily (follows with [facility] in [facility], with last eye exam in late 2020 or early 2021; planning to have a repeat eye exam soon).  2 weeks ago had a ruptured Baker's cyst on the right knee; was seen by orthopedics where fluid was removed from both knees and both knees were injected with some improvement.  Still with occasional burning pain going down the right calf.  Did not find Humira or Enbrel to be effective.    8/1/2022: no show to scheduled appointment.     10/24/2022: fell over her dog and hurt the left wrist 2 weeks ago; went to see sports medicine where a steroid injection of the left wrist was given without much improvement.  Has bilateral knee pain that is worse with activity; feels like she has a left Baker's cyst.  Other joints are doing well.  Does not feel like the medication changes made in April 2022 were helpful.    11/7/2022: No-show to  scheduled appointment    1/11/2024: Today she reports that she stopped methotrexate, sulfasalazine, and hydroxychloroquine shortly after the last rheumatology visit in 2022.  She associated methotrexate with a rash that occurred intermittently and depression symptoms.  After stopping all of her medications the rash resolved and depression symptoms resolved.  Arthritis worsened.  Limited range of motion of the left wrist, similar to previous.  Mild swelling and pain of both wrist.  Swelling and pain of both knees.  Knee pain is worse in the morning and improves with time and activity.  Planning for tubal ligation, that she says typically occurs within 30 days after seeing OB/GYN and she has an OB/GYN appointment on 2/15/2024    5/2/2024: The previous prednisone course was effective at alleviating the sternum pain and that has not returned.  She also had mild improvement of the knee pain and left wrist pain.  However, Orencia has only provided minimal benefit with regard to arthritis at the knees and wrists.  Swelling and severe pain of the knees that affects ambulation.  Also pain at the left wrist that is mildly improved but still with swelling and pain that is worse in the morning and improves with time and activity.  Knee pain is worse in the morning and improves with time and activity, but only minimal improvement.    8/1/2024: No-show to scheduled appointment    Today, 11/21/2024: Patient reports that she discontinued Orencia in May 2024 because although it helped her arthritis she did not like the injections.  She was unable to give her own injections so her  was administering the injections but with each injection she had significant pain so she wanted to discontinue it.  Leflunomide was well-tolerated and she says it worked great.  The previous steroid injection of each knee also resolved the pain and swelling of the knees.  Overall she was very happy with how well she was doing but then she ran out of  medication after 4 months of leflunomide, did not get labs done as instructed, and then no-show to her follow-up appointment.  Today, she presents with synovitis of the left wrist and pain and an effusion of the right knee.  She reports that her right knee was doing well until about 1 month ago when it began swelling again.  The right knee is without increased warmth or overlying erythema.  She would like a repeat arthrocentesis and steroid injection of the right knee today.    Denies fevers, chills, nausea, vomiting, constipation, diarrhea. No abdominal pain.  No black or bloody stools. No chest pain/pressure, palpitations, or shortness of breath. No LE swelling. No neck pain. No oral or nasal sores.  No rash. No sicca symptoms.     Tobacco: Former smoker  EtOH: Rarely  Drugs: None    ROS   12 point review of system was completed and negative except as noted in the HPI     Active Problem List     Patient Active Problem List   Diagnosis    Hypothyroidism    CARDIOVASCULAR SCREENING; LDL GOAL LESS THAN 160    Migraine    Right shoulder pain    Tobacco abuse    Menorrhagia    Other specified hypothyroidism    Mild major depression (H)    Rheumatoid arthritis (H)    Consultation for sterilization     Past Medical History     Past Medical History:   Diagnosis Date    NO ACTIVE PROBLEMS     Rheumatoid arthritis (H) 2/23/2021    Thyroid disease      Past Surgical History     Past Surgical History:   Procedure Laterality Date    LAPAROSCOPIC TUBAL LIGATION Bilateral 3/19/2024    Procedure: Laparoscopic Bilateral Tubal Ligation;  Surgeon: Terrell Torres MD;  Location:  OR    NO HISTORY OF SURGERY       Allergy     Allergies   Allergen Reactions    Vicodin [Hydrocodone-Acetaminophen]      nausea     Current Medication List     Current Outpatient Medications   Medication Sig Dispense Refill    leflunomide (ARAVA) 20 MG tablet Take 1 tablet (20 mg) by mouth daily 30 tablet 3    levothyroxine (SYNTHROID/LEVOTHROID) 137  "MCG tablet Take 1 tablet (137 mcg) by mouth daily 90 tablet 3    Abatacept (ORENCIA CLICKJECT) 125 MG/ML SOAJ auto-injector Inject 1 mL (125 mg) Subcutaneous every 7 days .  Hold for infection and seek medical attention. (Patient not taking: Reported on 11/21/2024) 4 mL 4    oxyCODONE (ROXICODONE) 5 MG tablet Take 1-2 tablets (5-10 mg) by mouth every 4 hours as needed for moderate to severe pain (Patient not taking: Reported on 11/21/2024) 6 tablet 0     Current Facility-Administered Medications   Medication Dose Route Frequency Provider Last Rate Last Admin    betamethasone acet & sod phos (CELESTONE) injection 6 mg  6 mg   Arash Slaughter MD   6 mg at 10/13/22 1702    ropivacaine (NAROPIN) injection 2 mL  2 mL   Arash Slaughter MD   2 mL at 10/13/22 1702         Social History   See HPI    Family History     Family History   Problem Relation Age of Onset    Breast Cancer Other 50        2 aunts    Mental Illness Other     Depression Sister     Depression Mother     Thyroid Disease Mother         multiple maternal relatives with hypothyroidism    Depression Maternal Grandmother     Thyroid Disease Maternal Grandmother      Physical Exam     Temp Readings from Last 3 Encounters:   03/19/24 97.7  F (36.5  C) (Temporal)   03/13/24 98.1  F (36.7  C) (Tympanic)   09/08/22 97.6  F (36.4  C) (Tympanic)     BP Readings from Last 5 Encounters:   11/21/24 107/72   05/02/24 109/77   03/19/24 113/71   03/13/24 116/82   02/15/24 108/74     Pulse Readings from Last 1 Encounters:   11/21/24 90     Resp Readings from Last 1 Encounters:   11/21/24 16     Estimated body mass index is 20.12 kg/m  as calculated from the following:    Height as of 3/13/24: 1.715 m (5' 7.5\").    Weight as of this encounter: 59.1 kg (130 lb 6.4 oz).    GEN: NAD.   HEENT:  Anicteric, noninjected sclera. No obvious external lesions of the ear and nose. Hearing intact.  CV: S1, S2. RRR. No m/r/g  PULM: No increased work of breathing. CTA bilaterally "   MSK: MCPs, PIPs, DIPs without swelling or tenderness to palpation.  Left wrist with synovial swelling and tenderness to palpation; limited range of motion.  Right wrist without swelling or tenderness to palpation.    Elbows and shoulders without swelling or tenderness to palpation.  Right knee with large effusion but no increased warmth or overlying erythema and no pain with range of motion; nontender to palpation.  Ankles without swelling or tenderness to palpation.  MTPs without swelling or tenderness to palpation.  SKIN: No rash or jaundice seen  PSYCH: Alert. Appropriate.     Labs / Imaging (select studies)     RF/CCP  Recent Labs   Lab Test 01/31/20  1505 10/03/19  1228   CCPIGG >340*  --    RHF  --  <20     CBC  Recent Labs   Lab Test 05/30/24  1003 01/11/24  1538 10/24/22  1456 01/31/20  1505 10/03/19  1228   WBC 6.2 7.9 7.1   < > 8.0   RBC 5.08 4.75 4.60   < > 4.79   HGB 14.0 12.1 13.4   < > 14.1   HCT 42.6 37.7 40.7   < > 42.3   MCV 84 79 89   < > 88   RDW 15.4* 14.7 14.4   < > 13.1    523* 342   < > 320   MCH 27.6 25.5* 29.1   < > 29.4   MCHC 32.9 32.1 32.9   < > 33.3   NEUTROPHIL 55 64 53   < > 58.6   LYMPH 36 28 33   < > 31.4   MONOCYTE 7 6 12   < > 6.0   EOSINOPHIL 1 1 1   < > 3.0   BASOPHIL 1 1 1   < > 1.0   ANEU  --   --   --   --  4.7   ALYM  --   --   --   --  2.5   DONOVAN  --   --   --   --  0.5   AEOS  --   --   --   --  0.2   ABAS  --   --   --   --  0.1   ANEUTAUTO 3.4 5.0 3.7   < >  --    ALYMPAUTO 2.2 2.2 2.4   < >  --    AMONOAUTO 0.4 0.5 0.9   < >  --    AEOSAUTO 0.1 0.1 0.1   < >  --    ABSBASO 0.1 0.1 0.1   < >  --     < > = values in this interval not displayed.     CMP  Recent Labs   Lab Test 05/30/24  1003 01/11/24  1538 10/24/22  1456 06/20/22  1107 05/23/22  1131 04/25/22  1245 04/25/22  1245 03/04/21  1440 06/16/20  1401 01/31/20  1505 10/03/19  1228   NA  --   --   --   --   --   --   --   --   --   --  140   POTASSIUM  --   --   --   --   --   --   --   --   --   --  4.0    CHLORIDE  --   --   --   --   --   --   --   --   --   --  107   CO2  --   --   --   --   --   --   --   --   --   --  28   ANIONGAP  --   --   --   --   --   --   --   --   --   --  5   GLC  --   --   --   --  99  --  82  --   --   --  77   BUN  --   --   --   --   --   --   --   --   --   --  13   CR 0.71 0.69 0.90   < > 0.83   < > 0.74 0.86 0.82 0.59 0.78   GFRESTIMATED >90 >90 85   < > >90   < > >90 88 >90 >90 >90   GFRESTBLACK  --   --   --   --   --   --   --  >90 >90 >90 >90   BHAVIN  --   --   --   --   --   --   --   --   --   --  9.1   BILITOTAL 0.3 0.3 0.3   < > 0.4   < > 0.3  --   --   --  0.3   ALBUMIN 4.5 4.2 3.5   < > 3.4   < > 3.5  --   --   --  4.0   PROTTOTAL 7.0 7.9 7.2   < > 7.5   < > 7.7  --   --   --  7.6   ALKPHOS 49 64 49   < > 52   < > 76  --   --   --  54   AST 18 19 32   < > 19   < > 17 16 14 13 23   ALT 12 7 26   < > 21   < > 23 42 27 31 39    < > = values in this interval not displayed.     Calcium/VitaminD  Recent Labs   Lab Test 10/03/19  1228   BHAVIN 9.1     ESR/CRP  Recent Labs   Lab Test 05/30/24  1003 01/11/24  1538 10/24/22  1456 05/23/22  1131 04/25/22  1245   SED 5 33* 10 16 10   CRP  --   --  27.4* 40.8* 32.9*   CRPI <3.00 29.90*  --   --   --      Hepatitis B  Recent Labs   Lab Test 01/11/24  1538 04/25/22  1245   HBCAB Nonreactive Nonreactive   HEPBANG Nonreactive Nonreactive     Hepatitis C  Recent Labs   Lab Test 01/11/24  1538 04/25/22  1245   HCVAB Nonreactive Nonreactive     Lyme ab screening  Recent Labs   Lab Test 10/30/19  1232   LYMEGM 0.17     Tuberculosis Screening  Recent Labs   Lab Test 01/11/24  1538 10/24/22  1456 06/16/20  1401   TBRES Negative Negative Negative     Immunization History     Immunization History   Administered Date(s) Administered    HPV 08/17/2010, 10/18/2010, 10/12/2011    HPV Quadrivalent 08/17/2010, 10/18/2010    Influenza (IIV3) PF 10/12/2011    TD,PF 7+ (Tenivac) 08/06/2009    TDAP (Adacel,Boostrix) 02/29/2016    TDAP Vaccine (Adacel)  10/12/2011     Procedure     Procedure: Arthrocentesis and steroid injection of the right knee  Indication: Pain and swelling of the right knee, rheumatoid arthritis    The procedure was explained in detail. Risks including infection, pain, structural damage such as cartilage damage and tendon rupture, and medication reaction was explained. The option of not doing the procedure was also provided. All questions were answered and the patient consented to the procedure.     A time-out was performed and the correct patient, procedure, and laterality were verified.  The right knee was examined and location for injection was identified. The area was cleaned with chlorhexidine, twice.  Ethyl chloride was then used for topical anaesthetic. Then, lidocaine 1% 3mL was injected subcutaneously and and deeper tissue for local anaesthetic. Then using a 3.5 inch 18 gauge needle, fluid was aspirated from the joint. Then without removing the needle from the joint space, syringes were changed and a mixture of lidocaine 1% 2 mL and Kenalog 40mg was injected into the intra-articular space.     The patient tolerated the procedure well. No complications.    Fluid collected:  Appearance: Cloudy yellow, nonviscous  Volume: 45 mL    Fluid was sent to the lab for crystal analysis, cell count, culture, and Gram stain    1% Lidocaine  : Hospira  Lot #: [id number]  EXPIRATION DATE: 2025-SEP  ND: 5455-8596-10    MEDICATION: Triamcinolone 40 mg  : [employer]  LOT #: [id number]  EXPIRATION DATE:  2025-12-31  NDC#: [address]-0         Chart documentation done in part with Dragon Voice recognition Software. Although reviewed after completion, some word and grammatical error may remain.    Tanner Cintron MD

## 2024-11-25 ENCOUNTER — TELEPHONE (OUTPATIENT)
Dept: RHEUMATOLOGY | Facility: CLINIC | Age: 39
End: 2024-11-25
Payer: COMMERCIAL

## 2024-11-25 NOTE — TELEPHONE ENCOUNTER
"  Called pt and let her know MD message her knee is  Feeling a lot better, swelling down, Not red, not warm to the touch. Overall  \"a lot better\"      KERA Torres RN 11/25/2024 4:25 PM      "

## 2024-11-25 NOTE — TELEPHONE ENCOUNTER
[facility] Call Center    Phone Message    May a detailed message be left on voicemail: no     Reason for Call: Other: Claude with lab calling with significant lab results. Please follow up.      Action Taken: Other: rheum      Travel Screening: Not Applicable     Date of Service:

## 2024-11-26 LAB
BACTERIA SNV CULT: ABNORMAL
BACTERIA SNV CULT: ABNORMAL
BACTERIA SNV CULT: NO GROWTH
GRAM STAIN RESULT: NORMAL
GRAM STAIN RESULT: NORMAL

## 2024-12-05 LAB
BACTERIA SNV CULT: ABNORMAL
BACTERIA SNV CULT: ABNORMAL

## 2025-01-16 ENCOUNTER — LAB (OUTPATIENT)
Dept: LAB | Facility: CLINIC | Age: 40
End: 2025-01-16
Payer: COMMERCIAL

## 2025-01-16 DIAGNOSIS — Z79.899 HIGH RISK MEDICATION USE: ICD-10-CM

## 2025-01-16 DIAGNOSIS — M05.9 SEROPOSITIVE RHEUMATOID ARTHRITIS (H): ICD-10-CM

## 2025-01-16 LAB
BASOPHILS # BLD AUTO: 0 10E3/UL (ref 0–0.2)
BASOPHILS NFR BLD AUTO: 1 %
EOSINOPHIL # BLD AUTO: 0.1 10E3/UL (ref 0–0.7)
EOSINOPHIL NFR BLD AUTO: 2 %
ERYTHROCYTE [DISTWIDTH] IN BLOOD BY AUTOMATED COUNT: 14.3 % (ref 10–15)
ERYTHROCYTE [SEDIMENTATION RATE] IN BLOOD BY WESTERGREN METHOD: 13 MM/HR (ref 0–20)
HCT VFR BLD AUTO: 39.3 % (ref 35–47)
HGB BLD-MCNC: 13.3 G/DL (ref 11.7–15.7)
IMM GRANULOCYTES # BLD: 0 10E3/UL
IMM GRANULOCYTES NFR BLD: 0 %
LYMPHOCYTES # BLD AUTO: 2.1 10E3/UL (ref 0.8–5.3)
LYMPHOCYTES NFR BLD AUTO: 31 %
MCH RBC QN AUTO: 27.8 PG (ref 26.5–33)
MCHC RBC AUTO-ENTMCNC: 33.8 G/DL (ref 31.5–36.5)
MCV RBC AUTO: 82 FL (ref 78–100)
MONOCYTES # BLD AUTO: 0.4 10E3/UL (ref 0–1.3)
MONOCYTES NFR BLD AUTO: 6 %
NEUTROPHILS # BLD AUTO: 4.2 10E3/UL (ref 1.6–8.3)
NEUTROPHILS NFR BLD AUTO: 62 %
PLATELET # BLD AUTO: 326 10E3/UL (ref 150–450)
RBC # BLD AUTO: 4.78 10E6/UL (ref 3.8–5.2)
WBC # BLD AUTO: 6.9 10E3/UL (ref 4–11)

## 2025-01-18 LAB
GAMMA INTERFERON BACKGROUND BLD IA-ACNC: 0.01 IU/ML
M TB IFN-G BLD-IMP: NEGATIVE
M TB IFN-G CD4+ BCKGRND COR BLD-ACNC: 6.51 IU/ML
MITOGEN IGNF BCKGRD COR BLD-ACNC: 0 IU/ML
MITOGEN IGNF BCKGRD COR BLD-ACNC: 0.05 IU/ML
QUANTIFERON MITOGEN: 6.52 IU/ML
QUANTIFERON NIL TUBE: 0.01 IU/ML
QUANTIFERON TB1 TUBE: 0.06 IU/ML
QUANTIFERON TB2 TUBE: 0.01

## 2025-02-20 ENCOUNTER — OFFICE VISIT (OUTPATIENT)
Dept: RHEUMATOLOGY | Facility: CLINIC | Age: 40
End: 2025-02-20
Payer: COMMERCIAL

## 2025-02-20 VITALS
OXYGEN SATURATION: 99 % | WEIGHT: 124.8 LBS | SYSTOLIC BLOOD PRESSURE: 114 MMHG | DIASTOLIC BLOOD PRESSURE: 76 MMHG | HEART RATE: 95 BPM | RESPIRATION RATE: 16 BRPM | BODY MASS INDEX: 19.26 KG/M2

## 2025-02-20 DIAGNOSIS — Z79.899 HIGH RISK MEDICATION USE: ICD-10-CM

## 2025-02-20 DIAGNOSIS — M05.9 SEROPOSITIVE RHEUMATOID ARTHRITIS (H): Primary | ICD-10-CM

## 2025-02-20 LAB
ALBUMIN SERPL BCG-MCNC: 4.6 G/DL (ref 3.5–5.2)
ALP SERPL-CCNC: 53 U/L (ref 40–150)
ALT SERPL W P-5'-P-CCNC: 11 U/L (ref 0–50)
AST SERPL W P-5'-P-CCNC: 21 U/L (ref 0–45)
BASOPHILS # BLD AUTO: 0.1 10E3/UL (ref 0–0.2)
BASOPHILS NFR BLD AUTO: 1 %
BILIRUB DIRECT SERPL-MCNC: 0.18 MG/DL (ref 0–0.3)
BILIRUB SERPL-MCNC: 0.5 MG/DL
CREAT SERPL-MCNC: 0.68 MG/DL (ref 0.51–0.95)
CRP SERPL-MCNC: <3 MG/L
EGFRCR SERPLBLD CKD-EPI 2021: >90 ML/MIN/1.73M2
EOSINOPHIL # BLD AUTO: 0.1 10E3/UL (ref 0–0.7)
EOSINOPHIL NFR BLD AUTO: 2 %
ERYTHROCYTE [DISTWIDTH] IN BLOOD BY AUTOMATED COUNT: 14.4 % (ref 10–15)
ERYTHROCYTE [SEDIMENTATION RATE] IN BLOOD BY WESTERGREN METHOD: 9 MM/HR (ref 0–20)
HCT VFR BLD AUTO: 42.1 % (ref 35–47)
HGB BLD-MCNC: 14 G/DL (ref 11.7–15.7)
IMM GRANULOCYTES # BLD: 0 10E3/UL
IMM GRANULOCYTES NFR BLD: 0 %
LYMPHOCYTES # BLD AUTO: 2.7 10E3/UL (ref 0.8–5.3)
LYMPHOCYTES NFR BLD AUTO: 38 %
MCH RBC QN AUTO: 27.9 PG (ref 26.5–33)
MCHC RBC AUTO-ENTMCNC: 33.3 G/DL (ref 31.5–36.5)
MCV RBC AUTO: 84 FL (ref 78–100)
MONOCYTES # BLD AUTO: 0.3 10E3/UL (ref 0–1.3)
MONOCYTES NFR BLD AUTO: 5 %
NEUTROPHILS # BLD AUTO: 3.8 10E3/UL (ref 1.6–8.3)
NEUTROPHILS NFR BLD AUTO: 54 %
PLATELET # BLD AUTO: 343 10E3/UL (ref 150–450)
PROT SERPL-MCNC: 7.8 G/DL (ref 6.4–8.3)
RBC # BLD AUTO: 5.01 10E6/UL (ref 3.8–5.2)
WBC # BLD AUTO: 7 10E3/UL (ref 4–11)

## 2025-02-20 RX ORDER — LEFLUNOMIDE 20 MG/1
20 TABLET ORAL DAILY
Qty: 90 TABLET | Refills: 1 | Status: SHIPPED | OUTPATIENT
Start: 2025-02-20

## 2025-02-20 RX ORDER — TOFACITINIB 11 MG/1
11 TABLET, FILM COATED, EXTENDED RELEASE ORAL DAILY
Qty: 90 TABLET | Refills: 1 | Status: SHIPPED | OUTPATIENT
Start: 2025-02-20

## 2025-02-20 NOTE — NURSING NOTE
RAPID3 (0-30) Cumulative Score  7.7          RAPID3 Weighted Score (divide #4 by 3 and that is the weighted score)  2.5

## 2025-02-20 NOTE — PATIENT INSTRUCTIONS
RHEUMATOLOGY    [facility]  [address] [place] [address]  [place] [address]    Phone number: [phone]  Fax number: [phone]    If you need a medication refill, please contact us as you may need lab work and/or a follow up visit prior to your refill.      Thank you for choosing [facility]!    Zoey Hernandez CMA Rheumatology

## 2025-02-20 NOTE — PROGRESS NOTES
Rheumatology Clinic Visit      Lillie Sánchez MRN# [id number]   YOB: 1985 Age: 39 year old      Date of visit: 2/20/25   PCP: Kehr, Kristen M    Chief Complaint   Patient presents with:  Rheumatoid Arthritis: Knees are doing good but left wrist hurts and swells. Xeljanz makes her urinate a lot    Assessment and Plan     1.  Seropositive erosive rheumatoid arthritis (RF negative, CCP >340): Reportedly diagnosed with rheumatoid arthritis in 2019.  Previously followed by Dr. Murphy at an outside clinic.  Establish care with me on 4/25/2022.  Previously on Humira (ineffective), Enbrel (ineffective), methotrexate (felt more depressed, intermittent rashes that she associates with methotrexate), hydroxychloroquine, sulfasalazine, Orencia (partially effective; patient self-discontinued because of injection site pain).  I first evaluated on 4/25/2022 at which point she had severe inflammatory arthritis affecting the knees and wrists, and intermittent symptoms at the sternum; at that appointment methotrexate dose was increased, hydroxychloroquine dose was increased, and sulfasalazine was started; she was then lost to follow-up, later presenting on 10/24/2022 where she was significantly improved but found to have inflammatory effusion of the knee that was injected with steroids.  She was then lost to follow-up again, presenting on 1/11/2024 reporting that she stopped all treatments in 2022  because she associated a rash and depression symptoms with methotrexate, and states that she feels better arthritis symptoms worsened since stopping medications.  1/11/2024: Synovitis of the L>R wrists and knees.  Reviewed the diagnosis of rheumatoid arthritis, discussing both the articular and extra-articular manifestations of the disease, and the treatment options.  She prefers to avoid methotrexate because of the associated depressed feeling and rashes.  Hydroxychloroquine and sulfasalazine were partially effective  previously but then she was lost to follow-up.  In January 2024 a course of prednisone was used that improve the knee pain and resolved the sternal pain that she had; Orencia had been minimally beneficial but the sternal pain had not returned.  5/2/2024: Added leflunomide.  11/21/2024: She had discontinued Orencia when leflunomide was started because of Orencia-associated injection site pain and she prefers to avoid injections if possible.  Therefore we discussed Xeljanz and she was in agreement.  2/20/2025: No synovitis on exam today.  Limited range of motion of the left wrist and degenerative arthritis symptoms of the left wrist.  Currently on leflunomide and Xeljanz.  Chronic illness  - Continue Xeljanz XR 11 mg daily  - Continue leflunomide 20 mg daily  - Labs today: CBC, Creatinine, Hepatic Panel, ESR, CRP  - Labs in 3 months: CBC, Creatinine, Hepatic Panel, ESR, CRP  - X-rays: bilateral hands/feet    By time of completing this note, x-rays returned showing normal x-rays of the feet.  However, there is advanced narrowing of the left radiocarpal joint space with erosive changes involving the distal radius, ulna and proximal margins of the scaphoid and lunate.  These x-rays will serve as a new baseline and be repeated in the future.    High risk medication requiring intensive toxicity monitoring at least quarterly.               Rapid 3, cumulative scores                      02/20/2025: 7.7  (Leflunomide 20 mg daily, Xeljanz XR 11mg daily)                      05/02/2024: 18   (Orencia)                       01/11/2024: 21   (no treatment)    # Pregnancy plans: History of tubal ligation    2.  Vaccinations: Vaccinations reviewed with Ms. Sánchez.  Risks and benefits of vaccinations were discussed.    - Influenza: encouraged yearly vaccination  - [id number]: Refused by patient  - COVID19: Refused by patient.  - Shingrix: Refused by patient    Total minutes spent in evaluation with patient, documentation, order  entry, and review of pertinent studies and chart notes: 32   The longitudinal plan of care for the rheumatology problem(s) were addressed during this visit.  Due to added complexity of care, we will continue to support the patient and the subsequent management of this condition with ongoing continuity of care.       Ms. Sánchez verbalized agreement with and understanding of the rational for the diagnosis and treatment plan.  All questions were answered to best of my ability and the patient's satisfaction. Ms. Sánchez was advised to contact the clinic with any questions that may arise after the clinic visit.      Thank you for involving me in the care of the patient    Return to clinic: 3 months    HPI   Lillie Sánchez is a 39 year old female with a past medical history significant for depression, tobacco use, migraines, hypothyroidism, and rheumatoid arthritis who presents for follow-up of rheumatoid arthritis.     6/3/2021 [facility] rheumatology note by Dr. Ronny Murphy documents rheumatoid arthritis.  On methotrexate and Enbrel.  Enbrel was started in February.  Humira was used previously but symptoms are returning 1 week prior to next dose.  Skin rash developed so it was recommended to stop TNF inhibitors and she was to see dermatology for evaluation of the rash.  Methotrexate was changed from oral to subcutaneous.  Methotrexate changed to 0.7 mL injections.  Prednisone was started at 15 mg via 15 mg per 5 mL solution     4/25/2022: Lillie reports being diagnosed with rheumatoid arthritis in 2019.  Currently with terrible joint pain involving the knees, wrists, and sternum.  Currently on prednisone 2mL of the 15mg/5mL solution of methylprednisolone, methotrexate 17.5mg PO wkly (says that SQ was not more effective in the past, and has not been on a higher dose of methotrexate), hydroxychloroquine 200mg daily (follows with [facility] in [facility], with last eye exam in  late 2020 or early 2021; planning to have a repeat eye exam soon).  2 weeks ago had a ruptured Baker's cyst on the right knee; was seen by orthopedics where fluid was removed from both knees and both knees were injected with some improvement.  Still with occasional burning pain going down the right calf.  Did not find Humira or Enbrel to be effective.    8/1/2022: no show to scheduled appointment.     10/24/2022: fell over her dog and hurt the left wrist 2 weeks ago; went to see sports medicine where a steroid injection of the left wrist was given without much improvement.  Has bilateral knee pain that is worse with activity; feels like she has a left Baker's cyst.  Other joints are doing well.  Does not feel like the medication changes made in April 2022 were helpful.    11/7/2022: No-show to scheduled appointment    1/11/2024: Today she reports that she stopped methotrexate, sulfasalazine, and hydroxychloroquine shortly after the last rheumatology visit in 2022.  She associated methotrexate with a rash that occurred intermittently and depression symptoms.  After stopping all of her medications the rash resolved and depression symptoms resolved.  Arthritis worsened.  Limited range of motion of the left wrist, similar to previous.  Mild swelling and pain of both wrist.  Swelling and pain of both knees.  Knee pain is worse in the morning and improves with time and activity.  Planning for tubal ligation, that she says typically occurs within 30 days after seeing OB/GYN and she has an OB/GYN appointment on 2/15/2024    5/2/2024: The previous prednisone course was effective at alleviating the sternum pain and that has not returned.  She also had mild improvement of the knee pain and left wrist pain.  However, Orencia has only provided minimal benefit with regard to arthritis at the knees and wrists.  Swelling and severe pain of the knees that affects ambulation.  Also pain at the left wrist that is mildly improved but  still with swelling and pain that is worse in the morning and improves with time and activity.  Knee pain is worse in the morning and improves with time and activity, but only minimal improvement.    8/1/2024: No-show to scheduled appointment    11/21/2024: Patient reports that she discontinued Orencia in May 2024 because although it helped her arthritis she did not like the injections.  She was unable to give her own injections so her  was administering the injections but with each injection she had significant pain so she wanted to discontinue it.  Leflunomide was well-tolerated and she says it worked great.  The previous steroid injection of each knee also resolved the pain and swelling of the knees.  Overall she was very happy with how well she was doing but then she ran out of medication after 4 months of leflunomide, did not get labs done as instructed, and then no-show to her follow-up appointment.  Today, she presents with synovitis of the left wrist and pain and an effusion of the right knee.  She reports that her right knee was doing well until about 1 month ago when it began swelling again.  The right knee is without increased warmth or overlying erythema.  She would like a repeat arthrocentesis and steroid injection of the right knee today.    Today, 2/20/2025: Significant improvement with Xeljanz and leflunomide.  No knee pain/swelling. Left wrist with chronically reduced ROM; no swelling currently.  3 times had urinary urgency; no increased frequency; no pain or burning with urination; she says that she will see her PCP or urgent care if recurrent urinary symptoms. States that it took almost a month to get Xeljanz from [facility] pharmacy after it was prescribed.  Overall very happy with how well she is doing.     Denies fevers, chills, nausea, vomiting, constipation, diarrhea. No abdominal pain.  No black or bloody stools. No chest pain/pressure, palpitations, or shortness of breath. No LE  swelling. No neck pain. No oral or nasal sores.  No rash. No sicca symptoms.     Tobacco: Former smoker  EtOH: Rarely  Drugs: None    ROS   12 point review of system was completed and negative except as noted in the HPI     Active Problem List     Patient Active Problem List   Diagnosis    Hypothyroidism    CARDIOVASCULAR SCREENING; LDL GOAL LESS THAN 160    Migraine    Right shoulder pain    Tobacco abuse    Menorrhagia    Other specified hypothyroidism    Mild major depression    Rheumatoid arthritis (H)    Consultation for sterilization     Past Medical History     Past Medical History:   Diagnosis Date    NO ACTIVE PROBLEMS     Rheumatoid arthritis (H) 2/23/2021    Thyroid disease      Past Surgical History     Past Surgical History:   Procedure Laterality Date    LAPAROSCOPIC TUBAL LIGATION Bilateral 3/19/2024    Procedure: Laparoscopic Bilateral Tubal Ligation;  Surgeon: Terrell Torres MD;  Location: [facility] OR    NO HISTORY OF SURGERY       Allergy     Allergies   Allergen Reactions    Vicodin [Hydrocodone-Acetaminophen]      nausea     Current Medication List     Current Outpatient Medications   Medication Sig Dispense Refill    leflunomide (ARAVA) 20 MG tablet Take 1 tablet (20 mg) by mouth daily. 30 tablet 2    levothyroxine (SYNTHROID/LEVOTHROID) 137 MCG tablet Take 1 tablet (137 mcg) by mouth daily 90 tablet 3    oxyCODONE (ROXICODONE) 5 MG tablet Take 1-2 tablets (5-10 mg) by mouth every 4 hours as needed for moderate to severe pain (Patient not taking: Reported on 11/21/2024) 6 tablet 0    tofacitinib (XELJANZ XR) 11 MG 24 hr tablet Take 1 tablet (11 mg) by mouth daily. Hold for signs of infection, then seek medical attention. 30 tablet 2     No current facility-administered medications for this visit.         Social History   See HPI    Family History     Family History   Problem Relation Age of Onset    Breast Cancer Other 50        2 aunts    Mental Illness Other     Depression Sister     Depression  "Mother     Thyroid Disease Mother         multiple maternal relatives with hypothyroidism    Depression Maternal Grandmother     Thyroid Disease Maternal Grandmother      Physical Exam     Temp Readings from Last 3 Encounters:   03/19/24 97.7  F (36.5  C) (Temporal)   03/13/24 98.1  F (36.7  C) (Tympanic)   09/08/22 97.6  F (36.4  C) (Tympanic)     BP Readings from Last 5 Encounters:   11/21/24 107/72   05/02/24 109/77   03/19/24 113/71   03/13/24 116/82   02/15/24 108/74     Pulse Readings from Last 1 Encounters:   11/21/24 90     Resp Readings from Last 1 Encounters:   11/21/24 16     Estimated body mass index is 20.12 kg/m  as calculated from the following:    Height as of 3/13/24: 1.715 m (5' 7.5\").    Weight as of 11/21/24: 59.1 kg (130 lb 6.4 oz).    GEN: NAD.   HEENT:  Anicteric, noninjected sclera. No obvious external lesions of the ear and nose. Hearing intact.  CV: S1, S2. RRR. No m/r/g  PULM: No increased work of breathing. CTA bilaterally   MSK: MCPs, PIPs, DIPs without swelling or tenderness to palpation.  Left wrist with limited ROM; no swelling, increased warmth, or overlying erythema. Right wrist without swelling or tenderness to palpation.    Elbows and shoulders without swelling or tenderness to palpation.  Knees without swelling or tenderness to palpation.  Ankles without swelling or tenderness to palpation.  MTPs without swelling or tenderness to palpation.  SKIN: No rash or jaundice seen  PSYCH: Alert. Appropriate.     Labs / Imaging (select studies)     RF/CCP  Recent Labs   Lab Test 01/31/20  1505 10/03/19  1228   CCPIGG >340*  --    RHF  --  <20     CBC  Recent Labs   Lab Test 01/16/25  1301 11/21/24  0818 05/30/24  1003 01/31/20  1505 10/03/19  1228   WBC 6.9 9.3 6.2   < > 8.0   RBC 4.78 4.71 5.08   < > 4.79   HGB 13.3 13.1 14.0   < > 14.1   HCT 39.3 39.8 42.6   < > 42.3   MCV 82 85 84   < > 88   RDW 14.3 12.1 15.4*   < > 13.1    457* 284   < > 320   MCH 27.8 27.8 27.6   < > 29.4 "   MCHC 33.8 32.9 32.9   < > 33.3   NEUTROPHIL 62 68 55   < > 58.6   LYMPH 31 23 36   < > 31.4   MONOCYTE 6 6 7   < > 6.0   EOSINOPHIL 2 2 1   < > 3.0   BASOPHIL 1 1 1   < > 1.0   ANEU  --   --   --   --  4.7   ALYM  --   --   --   --  2.5   DONOVAN  --   --   --   --  0.5   AEOS  --   --   --   --  0.2   ABAS  --   --   --   --  0.1   ANEUTAUTO 4.2 6.3 3.4   < >  --    ALYMPAUTO 2.1 2.2 2.2   < >  --    AMONOAUTO 0.4 0.5 0.4   < >  --    AEOSAUTO 0.1 0.2 0.1   < >  --    ABSBASO 0.0 0.1 0.1   < >  --     < > = values in this interval not displayed.     CMP  Recent Labs   Lab Test 01/16/25  1301 11/21/24  0818 05/30/24  1003 06/20/22  1107 05/23/22  1131 04/25/22  1245 04/25/22  1245 03/04/21  1440 06/16/20  1401 01/31/20  1505 10/03/19  1228   NA  --   --   --   --   --   --   --   --   --   --  140   POTASSIUM  --   --   --   --   --   --   --   --   --   --  4.0   CHLORIDE  --   --   --   --   --   --   --   --   --   --  107   CO2  --   --   --   --   --   --   --   --   --   --  28   ANIONGAP  --   --   --   --   --   --   --   --   --   --  5   GLC  --   --   --   --  99  --  82  --   --   --  77   BUN  --   --   --   --   --   --   --   --   --   --  13   CR 0.73 0.84 0.71   < > 0.83   < > 0.74 0.86 0.82 0.59 0.78   GFRESTIMATED >90 >90 >90   < > >90   < > >90 88 >90 >90 >90   GFRESTBLACK  --   --   --   --   --   --   --  >90 >90 >90 >90   BHAVIN  --   --   --   --   --   --   --   --   --   --  9.1   BILITOTAL 0.4 0.3 0.3   < > 0.4   < > 0.3  --   --   --  0.3   ALBUMIN 4.3 4.2 4.5   < > 3.4   < > 3.5  --   --   --  4.0   PROTTOTAL 7.0 7.7 7.0   < > 7.5   < > 7.7  --   --   --  7.6   ALKPHOS 46 68 49   < > 52   < > 76  --   --   --  54   AST 15 15 18   < > 19   < > 17 16 14 13 23   ALT 10 <5 12   < > 21   < > 23 42 27 31 39    < > = values in this interval not displayed.     Calcium/VitaminD  Recent Labs   Lab Test 10/03/19  1228   BHAVIN 9.1     ESR/CRP  Recent Labs   Lab Test 01/16/25  1301 11/21/24  0818  05/30/24  1003 01/11/24  1538 10/24/22  1456 05/23/22  1131 04/25/22  1245   SED 13 20 5   < > 10 16 10   CRP  --   --   --   --  27.4* 40.8* 32.9*   CRPI <3.00 22.20* <3.00   < >  --   --   --     < > = values in this interval not displayed.     Lipid Panel  Recent Labs   Lab Test 01/16/25  1301 11/21/24  0818   CHOL 193 174   TRIG 107 95   HDL 38* 34*   * 121*   NHDL 155* 140*     Hepatitis B  Recent Labs   Lab Test 01/11/24  1538 04/25/22  1245   HBCAB Nonreactive Nonreactive   HEPBANG Nonreactive Nonreactive     Hepatitis C  Recent Labs   Lab Test 01/11/24  1538 04/25/22  1245   HCVAB Nonreactive Nonreactive     Lyme ab screening  Recent Labs   Lab Test 10/30/19  1232   LYMEGM 0.17     Tuberculosis Screening  Recent Labs   Lab Test 01/16/25  1301 01/11/24  1538 10/24/22  1456   TBRES Negative Negative Negative     Immunization History     Immunization History   Administered Date(s) Administered    HPV 08/17/2010, 10/18/2010, 10/12/2011    HPV Quadrivalent 08/17/2010, 10/18/2010    Influenza (IIV3) PF 10/12/2011    TD,PF 7+ (Tenivac) 08/06/2009    TDAP (Adacel,Boostrix) 02/29/2016    TDAP Vaccine (Adacel) 10/12/2011          Chart documentation done in part with Dragon Voice recognition Software. Although reviewed after completion, some word and grammatical error may remain.    Tanner Cintron MD

## 2025-05-22 ENCOUNTER — LAB (OUTPATIENT)
Dept: LAB | Facility: CLINIC | Age: 40
End: 2025-05-22
Payer: COMMERCIAL

## 2025-05-22 DIAGNOSIS — M05.9 SEROPOSITIVE RHEUMATOID ARTHRITIS (H): ICD-10-CM

## 2025-05-22 DIAGNOSIS — Z79.899 HIGH RISK MEDICATION USE: ICD-10-CM

## 2025-05-22 LAB
BASOPHILS # BLD AUTO: 0.1 10E3/UL (ref 0–0.2)
BASOPHILS NFR BLD AUTO: 1 %
EOSINOPHIL # BLD AUTO: 0.3 10E3/UL (ref 0–0.7)
EOSINOPHIL NFR BLD AUTO: 3 %
ERYTHROCYTE [DISTWIDTH] IN BLOOD BY AUTOMATED COUNT: 12.9 % (ref 10–15)
ERYTHROCYTE [SEDIMENTATION RATE] IN BLOOD BY WESTERGREN METHOD: 10 MM/HR (ref 0–20)
HCT VFR BLD AUTO: 40.8 % (ref 35–47)
HGB BLD-MCNC: 13.6 G/DL (ref 11.7–15.7)
IMM GRANULOCYTES # BLD: 0 10E3/UL
IMM GRANULOCYTES NFR BLD: 0 %
LYMPHOCYTES # BLD AUTO: 3 10E3/UL (ref 0.8–5.3)
LYMPHOCYTES NFR BLD AUTO: 30 %
MCH RBC QN AUTO: 28.9 PG (ref 26.5–33)
MCHC RBC AUTO-ENTMCNC: 33.3 G/DL (ref 31.5–36.5)
MCV RBC AUTO: 87 FL (ref 78–100)
MONOCYTES # BLD AUTO: 0.5 10E3/UL (ref 0–1.3)
MONOCYTES NFR BLD AUTO: 5 %
NEUTROPHILS # BLD AUTO: 6.1 10E3/UL (ref 1.6–8.3)
NEUTROPHILS NFR BLD AUTO: 61 %
PLATELET # BLD AUTO: 399 10E3/UL (ref 150–450)
RBC # BLD AUTO: 4.71 10E6/UL (ref 3.8–5.2)
WBC # BLD AUTO: 10.1 10E3/UL (ref 4–11)

## 2025-05-29 ENCOUNTER — OFFICE VISIT (OUTPATIENT)
Dept: RHEUMATOLOGY | Facility: CLINIC | Age: 40
End: 2025-05-29
Payer: COMMERCIAL

## 2025-05-29 VITALS
HEART RATE: 78 BPM | TEMPERATURE: 98.3 F | DIASTOLIC BLOOD PRESSURE: 82 MMHG | WEIGHT: 132.4 LBS | SYSTOLIC BLOOD PRESSURE: 127 MMHG | BODY MASS INDEX: 20.43 KG/M2 | OXYGEN SATURATION: 100 %

## 2025-05-29 DIAGNOSIS — M05.9 SEROPOSITIVE RHEUMATOID ARTHRITIS (H): Primary | ICD-10-CM

## 2025-05-29 DIAGNOSIS — Z79.899 HIGH RISK MEDICATION USE: ICD-10-CM

## 2025-05-29 RX ORDER — LEFLUNOMIDE 20 MG/1
20 TABLET ORAL DAILY
Qty: 90 TABLET | Refills: 2 | Status: SHIPPED | OUTPATIENT
Start: 2025-05-29

## 2025-05-29 RX ORDER — TOFACITINIB 11 MG/1
11 TABLET, FILM COATED, EXTENDED RELEASE ORAL DAILY
Qty: 90 TABLET | Refills: 2 | Status: SHIPPED | OUTPATIENT
Start: 2025-05-29

## 2025-05-29 NOTE — PROGRESS NOTES
Rheumatology Clinic Visit      Lillie Sánchez MRN# [id number]   YOB: 1985 Age: 39 year old      Date of visit: 5/29/25   PCP: Kehr, Kristen M    Chief Complaint   Patient presents with:  Arthritis    Assessment and Plan     1.  Seropositive erosive rheumatoid arthritis (RF negative, CCP >340): Reportedly diagnosed with rheumatoid arthritis in 2019.  Previously followed by Dr. Murphy at an outside clinic.  Establish care with me on 4/25/2022.  Previously on Humira (ineffective), Enbrel (ineffective), methotrexate (felt more depressed, intermittent rashes that she associates with methotrexate), hydroxychloroquine, sulfasalazine, Orencia (partially effective; patient self-discontinued because of injection site pain).  I first evaluated on 4/25/2022 at which point she had severe inflammatory arthritis affecting the knees and wrists, and intermittent symptoms at the sternum; at that appointment methotrexate dose was increased, hydroxychloroquine dose was increased, and sulfasalazine was started; she was then lost to follow-up, later presenting on 10/24/2022 where she was significantly improved but found to have inflammatory effusion of the knee that was injected with steroids.  She was then lost to follow-up again, presenting on 1/11/2024 reporting that she stopped all treatments in 2022  because she associated a rash and depression symptoms with methotrexate, and states that she feels better arthritis symptoms worsened since stopping medications.  1/11/2024: Synovitis of the L>R wrists and knees.  Reviewed the diagnosis of rheumatoid arthritis, discussing both the articular and extra-articular manifestations of the disease, and the treatment options.  She prefers to avoid methotrexate because of the associated depressed feeling and rashes.  Hydroxychloroquine and sulfasalazine were partially effective previously but then she was lost to follow-up.  In January 2024 a course of prednisone was used  that improve the knee pain and resolved the sternal pain that she had; Orencia had been minimally beneficial but the sternal pain had not returned.  5/2/2024: Added leflunomide.  11/21/2024: She had discontinued Orencia when leflunomide was started because of Orencia-associated injection site pain and she prefers to avoid injections if possible.  Therefore we discussed Xeljanz and she was in agreement.  2/20/2025: No synovitis on exam today.  Limited range of motion of the left wrist and degenerative arthritis symptoms of the left wrist.  Currently on leflunomide and Xeljanz.  Stressed the importance of taking medications as prescribed as she has missed several doses of leflunomide and Xeljanz.  Note that we also had a discussion about the realistic expectation of the degenerative changes of her left wrist; she says her left wrist only bothers her with weather changes and has limited range of motion, stable, so she continues exercising her hand and stretching her hand on the left and avoids too much flexion or extension of the wrist.  No other joint pain.    - Continue Xeljanz XR 11 mg daily  - Continue leflunomide 20 mg daily  - Labs in 3 months: CBC, Creatinine, Hepatic Panel, ESR, CRP  - X-rays of the hands and feet to be repeated in about March 2026 or later    High risk medication requiring intensive toxicity monitoring at least quarterly.               Rapid 3, cumulative scores                      05/29/2025: 4.3  (Leflunomide 20 mg daily, Xeljanz XR 11mg daily)                      02/20/2025: 7.7  (Leflunomide 20 mg daily, Xeljanz XR 11mg daily)                      05/02/2024: 18   (Orencia)                       01/11/2024: 21   (no treatment)    # Pregnancy plans: History of tubal ligation    2.  Vaccinations: Vaccinations reviewed with Ms. Sánchez.  Risks and benefits of vaccinations were discussed.    - Influenza: encouraged yearly vaccination  - [id number]: Refused by patient  - COVID19: Refused by  patient.  - Shingrix: Refused by patient    Total minutes spent in evaluation with patient, documentation, , and review of pertinent studies and chart notes: 16  The longitudinal plan of care for the rheumatology problem(s) were addressed during this visit.  Due to added complexity of care, we will continue to support the patient and the subsequent management of this condition with ongoing continuity of care.       Ms. Sánchez verbalized agreement with and understanding of the rational for the diagnosis and treatment plan.  All questions were answered to best of my ability and the patient's satisfaction. Ms. Sánchez was advised to contact the clinic with any questions that may arise after the clinic visit.      Thank you for involving me in the care of the patient    Return to clinic: 3 months    HPI   Lillie Sánchez is a 39 year old female with a past medical history significant for depression, tobacco use, migraines, hypothyroidism, and rheumatoid arthritis who presents for follow-up of rheumatoid arthritis.     6/3/2021 [facility] rheumatology note by Dr. Ronny Murphy documents rheumatoid arthritis.  On methotrexate and Enbrel.  Enbrel was started in February.  Humira was used previously but symptoms are returning 1 week prior to next dose.  Skin rash developed so it was recommended to stop TNF inhibitors and she was to see dermatology for evaluation of the rash.  Methotrexate was changed from oral to subcutaneous.  Methotrexate changed to 0.7 mL injections.  Prednisone was started at 15 mg via 15 mg per 5 mL solution     4/25/2022: Lillie reports being diagnosed with rheumatoid arthritis in 2019.  Currently with terrible joint pain involving the knees, wrists, and sternum.  Currently on prednisone 2mL of the 15mg/5mL solution of methylprednisolone, methotrexate 17.5mg PO wkly (says that SQ was not more effective in the past, and has not been on a higher dose of  methotrexate), hydroxychloroquine 200mg daily (follows with [facility] in [facility], with last eye exam in late 2020 or early 2021; planning to have a repeat eye exam soon).  2 weeks ago had a ruptured Baker's cyst on the right knee; was seen by orthopedics where fluid was removed from both knees and both knees were injected with some improvement.  Still with occasional burning pain going down the right calf.  Did not find Humira or Enbrel to be effective.    8/1/2022: no show to scheduled appointment.     10/24/2022: fell over her dog and hurt the left wrist 2 weeks ago; went to see sports medicine where a steroid injection of the left wrist was given without much improvement.  Has bilateral knee pain that is worse with activity; feels like she has a left Baker's cyst.  Other joints are doing well.  Does not feel like the medication changes made in April 2022 were helpful.    11/7/2022: No-show to scheduled appointment    1/11/2024: Today she reports that she stopped methotrexate, sulfasalazine, and hydroxychloroquine shortly after the last rheumatology visit in 2022.  She associated methotrexate with a rash that occurred intermittently and depression symptoms.  After stopping all of her medications the rash resolved and depression symptoms resolved.  Arthritis worsened.  Limited range of motion of the left wrist, similar to previous.  Mild swelling and pain of both wrist.  Swelling and pain of both knees.  Knee pain is worse in the morning and improves with time and activity.  Planning for tubal ligation, that she says typically occurs within 30 days after seeing OB/GYN and she has an OB/GYN appointment on 2/15/2024    5/2/2024: The previous prednisone course was effective at alleviating the sternum pain and that has not returned.  She also had mild improvement of the knee pain and left wrist pain.  However, Orencia has only provided minimal benefit with regard to arthritis at the knees and wrists.  Swelling and  severe pain of the knees that affects ambulation.  Also pain at the left wrist that is mildly improved but still with swelling and pain that is worse in the morning and improves with time and activity.  Knee pain is worse in the morning and improves with time and activity, but only minimal improvement.    8/1/2024: No-show to scheduled appointment    11/21/2024: Patient reports that she discontinued Orencia in May 2024 because although it helped her arthritis she did not like the injections.  She was unable to give her own injections so her  was administering the injections but with each injection she had significant pain so she wanted to discontinue it.  Leflunomide was well-tolerated and she says it worked great.  The previous steroid injection of each knee also resolved the pain and swelling of the knees.  Overall she was very happy with how well she was doing but then she ran out of medication after 4 months of leflunomide, did not get labs done as instructed, and then no-show to her follow-up appointment.  Today, she presents with synovitis of the left wrist and pain and an effusion of the right knee.  She reports that her right knee was doing well until about 1 month ago when it began swelling again.  The right knee is without increased warmth or overlying erythema.  She would like a repeat arthrocentesis and steroid injection of the right knee today.    2/20/2025: Significant improvement with Xeljanz and leflunomide.  No knee pain/swelling. Left wrist with chronically reduced ROM; no swelling currently.  3 times had urinary urgency; no increased frequency; no pain or burning with urination; she says that she will see her PCP or urgent care if recurrent urinary symptoms. States that it took almost a month to get Xeljanz from [facility] specialty pharmacy after it was prescribed.  Overall very happy with how well she is doing.     Today, 5/29/2025: The only joint pain she has is of her left wrist with weather  changes; no swelling or increased warmth of the left wrist.  Limited range of motion left wrist is unchanged.  No other joint pain or swelling.  Morning stiffness for less than 10 minutes, only affecting the left wrist.  Has been several doses of leflunomide and Xeljanz because she has been very busy at work and sometimes forgets to take medication on her way out the door.  She has thought about strategies to reduce risk for missed doses of medication.    Denies fevers, chills, nausea, vomiting, constipation, diarrhea. No abdominal pain.  No black or bloody stools. No chest pain/pressure, palpitations, or shortness of breath. No LE swelling. No neck pain. No oral or nasal sores.  No rash. No sicca symptoms.     Tobacco: Former smoker  EtOH: Rarely  Drugs: None    ROS   12 point review of system was completed and negative except as noted in the HPI     Active Problem List     Patient Active Problem List   Diagnosis    Hypothyroidism    CARDIOVASCULAR SCREENING; LDL GOAL LESS THAN 160    Migraine    Right shoulder pain    Tobacco abuse    Menorrhagia    Other specified hypothyroidism    Mild major depression    Rheumatoid arthritis (H)    Consultation for sterilization     Past Medical History     Past Medical History:   Diagnosis Date    NO ACTIVE PROBLEMS     Rheumatoid arthritis (H) 2/23/2021    Thyroid disease      Past Surgical History     Past Surgical History:   Procedure Laterality Date    LAPAROSCOPIC TUBAL LIGATION Bilateral 3/19/2024    Procedure: Laparoscopic Bilateral Tubal Ligation;  Surgeon: Terrell Torres MD;  Location:  OR    NO HISTORY OF SURGERY       Allergy     Allergies   Allergen Reactions    Vicodin [Hydrocodone-Acetaminophen]      nausea     Current Medication List     Current Outpatient Medications   Medication Sig Dispense Refill    leflunomide (ARAVA) 20 MG tablet Take 1 tablet (20 mg) by mouth daily. 90 tablet 1    levothyroxine (SYNTHROID/LEVOTHROID) 137 MCG tablet Take 1 tablet  "(137 mcg) by mouth daily 90 tablet 3    tofacitinib (XELJANZ XR) 11 MG 24 hr tablet Take 1 tablet (11 mg) by mouth daily. Hold for signs of infection, then seek medical attention. 90 tablet 1     No current facility-administered medications for this visit.         Social History   See HPI    Family History     Family History   Problem Relation Age of Onset    Breast Cancer Other 50        2 aunts    Mental Illness Other     Depression Sister     Depression Mother     Thyroid Disease Mother         multiple maternal relatives with hypothyroidism    Depression Maternal Grandmother     Thyroid Disease Maternal Grandmother      Physical Exam     Temp Readings from Last 3 Encounters:   05/29/25 98.3  F (36.8  C)   03/19/24 97.7  F (36.5  C) (Temporal)   03/13/24 98.1  F (36.7  C) (Tympanic)     BP Readings from Last 5 Encounters:   05/29/25 127/82   02/20/25 114/76   11/21/24 107/72   05/02/24 109/77   03/19/24 113/71     Pulse Readings from Last 1 Encounters:   05/29/25 78     Resp Readings from Last 1 Encounters:   02/20/25 16     Estimated body mass index is 20.43 kg/m  as calculated from the following:    Height as of 3/13/24: 1.715 m (5' 7.5\").    Weight as of this encounter: 60.1 kg (132 lb 6.4 oz).    GEN: NAD.   HEENT:  Anicteric, noninjected sclera. No obvious external lesions of the ear and nose. Hearing intact.  CV: S1, S2. RRR. No m/r/g  PULM: No increased work of breathing. CTA bilaterally   MSK: MCPs, PIPs, DIPs without swelling or tenderness to palpation.  Left wrist with limited ROM; no swelling, increased warmth, or overlying erythema. Right wrist without swelling or tenderness to palpation. Elbows and shoulders without swelling or tenderness to palpation.  Knees without swelling or tenderness to palpation.  Ankles without swelling or tenderness to palpation.  MTPs without swelling or tenderness to palpation.  SKIN: No rash or jaundice seen  PSYCH: Alert. Appropriate.     Labs / Imaging (select studies) "     RF/CCP  Recent Labs   Lab Test 01/31/20  1505 10/03/19  1228   CCPIGG >340*  --    RHF  --  <20     CBC  Recent Labs   Lab Test 05/22/25  1055 02/20/25  1432 01/16/25  1301   WBC 10.1 7.0 6.9   RBC 4.71 5.01 4.78   HGB 13.6 14.0 13.3   HCT 40.8 42.1 39.3   MCV 87 84 82   RDW 12.9 14.4 14.3    343 326   MCH 28.9 27.9 27.8   MCHC 33.3 33.3 33.8   NEUTROPHIL 61 54 62   LYMPH 30 38 31   MONOCYTE 5 5 6   EOSINOPHIL 3 2 2   BASOPHIL 1 1 1   ANEU 6.1 3.8 4.2   ALYM 3.0 2.7 2.1   DONOVAN 0.5 0.3 0.4   AEOS 0.3 0.1 0.1   ABAS 0.1 0.1 0.0     CMP  Recent Labs   Lab Test 05/22/25  1055 02/20/25  1432 01/16/25  1301 06/20/22  1107 05/23/22  1131 04/25/22  1245 04/25/22  1245 03/04/21  1440 06/16/20  1401 01/31/20  1505 10/03/19  1228   NA  --   --   --   --   --   --   --   --   --   --  140   POTASSIUM  --   --   --   --   --   --   --   --   --   --  4.0   CHLORIDE  --   --   --   --   --   --   --   --   --   --  107   CO2  --   --   --   --   --   --   --   --   --   --  28   ANIONGAP  --   --   --   --   --   --   --   --   --   --  5   GLC  --   --   --   --  99  --  82  --   --   --  77   BUN  --   --   --   --   --   --   --   --   --   --  13   CR 0.85 0.68 0.73   < > 0.83   < > 0.74 0.86 0.82 0.59 0.78   GFRESTIMATED 90 >90 >90   < > >90   < > >90 88 >90 >90 >90   GFRESTBLACK  --   --   --   --   --   --   --  >90 >90 >90 >90   BHAVIN  --   --   --   --   --   --   --   --   --   --  9.1   BILITOTAL 0.3 0.5 0.4   < > 0.4   < > 0.3  --   --   --  0.3   ALBUMIN 4.4 4.6 4.3   < > 3.4   < > 3.5  --   --   --  4.0   PROTTOTAL 7.3 7.8 7.0   < > 7.5   < > 7.7  --   --   --  7.6   ALKPHOS 66 53 46   < > 52   < > 76  --   --   --  54   AST 20 21 15   < > 19   < > 17 16 14 13 23   ALT 11 11 10   < > 21   < > 23 42 27 31 39    < > = values in this interval not displayed.     Calcium/VitaminD  Recent Labs   Lab Test 10/03/19  1228   BHAVIN 9.1     ESR/CRP  Recent Labs   Lab Test 05/22/25  1055 02/20/25  1432 01/16/25  1301  01/11/24  1538 10/24/22  1456 05/23/22  1131 04/25/22  1245   SED 10 9 13   < > 10 16 10   CRP  --   --   --   --  27.4* 40.8* 32.9*   CRPI <3.00 <3.00 <3.00   < >  --   --   --     < > = values in this interval not displayed.     Lipid Panel  Recent Labs   Lab Test 01/16/25  1301 11/21/24  0818   CHOL 193 174   TRIG 107 95   HDL 38* 34*   * 121*   NHDL 155* 140*     Hepatitis B  Recent Labs   Lab Test 01/11/24  1538 04/25/22  1245   HBCAB Nonreactive Nonreactive   HEPBANG Nonreactive Nonreactive     Hepatitis C  Recent Labs   Lab Test 01/11/24  1538 04/25/22  1245   HCVAB Nonreactive Nonreactive     Lyme ab screening  Recent Labs   Lab Test 10/30/19  1232   LYMEGM 0.17     Tuberculosis Screening  Recent Labs   Lab Test 01/16/25  1301 01/11/24  1538 10/24/22  1456   TBRES Negative Negative Negative     Immunization History     Immunization History   Administered Date(s) Administered    HPV 08/17/2010, 10/18/2010, 10/12/2011    HPV Quadrivalent 08/17/2010, 10/18/2010    Influenza (IIV3) PF 10/12/2011    TD,PF 7+ (Tenivac) 08/06/2009    TDAP (Adacel,Boostrix) 02/29/2016    TDAP Vaccine (Adacel) 10/12/2011          Chart documentation done in part with Dragon Voice recognition Software. Although reviewed after completion, some word and grammatical error may remain.    Tanner Cintron MD

## 2025-05-29 NOTE — PATIENT INSTRUCTIONS
RHEUMATOLOGY    [facility]  [address]  [place] [address]    Phone number: [phone]  Fax number: [phone]    If you need a medication refill, please contact us as you may need lab work and/or a follow up visit prior to your refill.      Thank you for choosing [facility]!    KERA Torres RN 5/29/2025 1:03 PM

## 2025-07-15 ENCOUNTER — PATIENT OUTREACH (OUTPATIENT)
Dept: CARE COORDINATION | Facility: CLINIC | Age: 40
End: 2025-07-15
Payer: COMMERCIAL

## (undated) DEVICE — DRSG PRIMAPORE 02X3" 7133

## (undated) DEVICE — SOL WATER IRRIG 1000ML BOTTLE 07139-09

## (undated) DEVICE — PACK MINOR SBA15MIFSE

## (undated) DEVICE — ANTIFOG SOLUTION W/FOAM PAD CF-1001

## (undated) DEVICE — TUBING INSUFFLATION W/FILTER CPC TO LUER 620-030-301

## (undated) DEVICE — ENDO TROCAR FIRST ENTRY KII FIOS Z-THRD 05X100MM CTF03

## (undated) DEVICE — ENDO SCOPE WARMER TM500

## (undated) DEVICE — NDL 19GA 1.5"

## (undated) DEVICE — DRAPE LAVH/LAPAROSCOPY W/POUCH 29474

## (undated) DEVICE — DRAPE POUCH INSTRUMENT 3 POCKET 1018L

## (undated) DEVICE — ESU LIGASURE LAPAROSCOPIC BLUNT TIP SEALER 5MMX37CM LF1837

## (undated) DEVICE — GLOVE BIOGEL PI MICRO SZ 7.5 48575

## (undated) DEVICE — SU VICRYL 4-0 PS-2 18" UND J496H

## (undated) DEVICE — ENDO TROCAR SLEEVE KII Z-THREADED 05X100MM CTS02

## (undated) DEVICE — SUCTION CANISTER MEDIVAC LINER 1500ML W/LID 65651-515

## (undated) DEVICE — PREP CHLORAPREP 26ML TINTED ORANGE  260815

## (undated) RX ORDER — DEXAMETHASONE SODIUM PHOSPHATE 4 MG/ML
INJECTION, SOLUTION INTRA-ARTICULAR; INTRALESIONAL; INTRAMUSCULAR; INTRAVENOUS; SOFT TISSUE
Status: DISPENSED
Start: 2024-03-19

## (undated) RX ORDER — ONDANSETRON 2 MG/ML
INJECTION INTRAMUSCULAR; INTRAVENOUS
Status: DISPENSED
Start: 2024-03-19

## (undated) RX ORDER — EPINEPHRINE 1 MG/ML
INJECTION, SOLUTION INTRAMUSCULAR; SUBCUTANEOUS
Status: DISPENSED
Start: 2024-03-19

## (undated) RX ORDER — PROPOFOL 10 MG/ML
INJECTION, EMULSION INTRAVENOUS
Status: DISPENSED
Start: 2024-03-19

## (undated) RX ORDER — BUPIVACAINE HYDROCHLORIDE 5 MG/ML
INJECTION, SOLUTION EPIDURAL; INTRACAUDAL
Status: DISPENSED
Start: 2024-03-19

## (undated) RX ORDER — BUPIVACAINE HYDROCHLORIDE 5 MG/ML
INJECTION, SOLUTION PERINEURAL
Status: DISPENSED
Start: 2024-03-19

## (undated) RX ORDER — FENTANYL CITRATE 0.05 MG/ML
INJECTION, SOLUTION INTRAMUSCULAR; INTRAVENOUS
Status: DISPENSED
Start: 2024-03-19

## (undated) RX ORDER — FENTANYL CITRATE 50 UG/ML
INJECTION, SOLUTION INTRAMUSCULAR; INTRAVENOUS
Status: DISPENSED
Start: 2024-03-19

## (undated) RX ORDER — OXYCODONE HYDROCHLORIDE 5 MG/1
TABLET ORAL
Status: DISPENSED
Start: 2024-03-19